# Patient Record
Sex: MALE | Race: WHITE | NOT HISPANIC OR LATINO | Employment: OTHER | URBAN - METROPOLITAN AREA
[De-identification: names, ages, dates, MRNs, and addresses within clinical notes are randomized per-mention and may not be internally consistent; named-entity substitution may affect disease eponyms.]

---

## 2019-03-18 ENCOUNTER — HOSPITAL ENCOUNTER (EMERGENCY)
Facility: OTHER | Age: 64
Discharge: HOME | End: 2019-03-18
Attending: EMERGENCY MEDICINE
Payer: COMMERCIAL

## 2019-03-18 VITALS
WEIGHT: 250 LBS | OXYGEN SATURATION: 97 % | DIASTOLIC BLOOD PRESSURE: 93 MMHG | SYSTOLIC BLOOD PRESSURE: 140 MMHG | TEMPERATURE: 98 F | HEART RATE: 80 BPM | RESPIRATION RATE: 16 BRPM

## 2019-03-18 DIAGNOSIS — Z48.02 ENCOUNTER FOR REMOVAL OF SUTURES: Primary | ICD-10-CM

## 2019-03-18 PROCEDURE — 99282 EMERGENCY DEPT VISIT SF MDM: CPT

## 2019-03-18 RX ORDER — SIMVASTATIN 10 MG/1
10 TABLET, FILM COATED ORAL NIGHTLY
Status: ON HOLD | COMMUNITY
End: 2021-01-19

## 2019-03-18 RX ORDER — HYDROCHLOROTHIAZIDE 25 MG/1
25 TABLET ORAL DAILY
COMMUNITY

## 2019-03-18 RX ORDER — DIAZEPAM 10 MG/1
10 TABLET ORAL DAILY
COMMUNITY

## 2019-03-18 RX ORDER — TELMISARTAN 80 MG/1
80 TABLET ORAL DAILY
COMMUNITY
End: 2022-05-14

## 2019-03-18 RX ORDER — FLUVOXAMINE MALEATE 100 MG/1
100 TABLET, COATED ORAL 2 TIMES DAILY
COMMUNITY
End: 2020-10-21

## 2019-03-18 RX ORDER — QUETIAPINE FUMARATE 50 MG/1
50 TABLET, FILM COATED ORAL 2 TIMES DAILY
COMMUNITY
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
## 2019-09-05 ENCOUNTER — OFFICE VISIT (OUTPATIENT)
Dept: CARDIOLOGY | Facility: CLINIC | Age: 64
End: 2019-09-05
Attending: INTERNAL MEDICINE
Payer: MEDICARE

## 2019-09-05 VITALS
HEART RATE: 57 BPM | SYSTOLIC BLOOD PRESSURE: 132 MMHG | BODY MASS INDEX: 41.47 KG/M2 | HEIGHT: 69 IN | DIASTOLIC BLOOD PRESSURE: 69 MMHG | WEIGHT: 280 LBS

## 2019-09-05 DIAGNOSIS — I34.1 MITRAL VALVE PROLAPSE: ICD-10-CM

## 2019-09-05 DIAGNOSIS — Z82.49 FAMILY HISTORY OF ISCHEMIC HEART DISEASE: ICD-10-CM

## 2019-09-05 DIAGNOSIS — I35.0 NONRHEUMATIC AORTIC VALVE STENOSIS: ICD-10-CM

## 2019-09-05 DIAGNOSIS — E78.00 HYPERCHOLESTEROLEMIA: ICD-10-CM

## 2019-09-05 DIAGNOSIS — E66.01 MORBID OBESITY: ICD-10-CM

## 2019-09-05 DIAGNOSIS — I10 ESSENTIAL HYPERTENSION: ICD-10-CM

## 2019-09-05 PROCEDURE — 99204 OFFICE O/P NEW MOD 45 MIN: CPT | Mod: S$GLB,,, | Performed by: INTERNAL MEDICINE

## 2019-09-05 PROCEDURE — 3008F PR BODY MASS INDEX (BMI) DOCUMENTED: ICD-10-PCS | Mod: CPTII,S$GLB,, | Performed by: INTERNAL MEDICINE

## 2019-09-05 PROCEDURE — 3008F BODY MASS INDEX DOCD: CPT | Mod: CPTII,S$GLB,, | Performed by: INTERNAL MEDICINE

## 2019-09-05 PROCEDURE — 99204 PR OFFICE/OUTPT VISIT, NEW, LEVL IV, 45-59 MIN: ICD-10-PCS | Mod: S$GLB,,, | Performed by: INTERNAL MEDICINE

## 2019-09-05 RX ORDER — ATENOLOL 100 MG/1
100 TABLET ORAL DAILY
Refills: 3 | COMMUNITY
Start: 2019-07-16 | End: 2019-09-05 | Stop reason: SDUPTHER

## 2019-09-05 RX ORDER — ATENOLOL 50 MG/1
50 TABLET ORAL DAILY
Qty: 90 TABLET | Refills: 3 | Status: SHIPPED | OUTPATIENT
Start: 2019-09-05 | End: 2022-05-14

## 2019-09-05 NOTE — LETTER
September 5, 2019      Bhupendra Fofana MD  [facility] [address]           [facility]  [address] [place] [address], [address]  [facility] [address]  Phone: [phone]  Fax: [phone]          Patient: Ervin Hernandez   MR Number: [id number]   YOB: 1955   Date of Visit: 9/5/2019       Dear Dr. Bhupendra Fofana:    Thank you for referring Ervin Hernandez to me for evaluation. Attached you will find relevant portions of my assessment and plan of care.    If you have questions, please do not hesitate to call me. I look forward to following Ervin Hernandez along with you.    Sincerely,    Umesh Link MD    Enclosure  CC:  No Recipients    If you would like to receive this communication electronically, please contact [email or web address] or [phone] to request more information on [facility] access.    For providers and/or their staff who would like to refer a patient to [facility], please contact us through our one-stop-shop provider referral line, [facility], at [phone].    If you feel you have received this communication in error or would no longer like to receive these types of communications, please e-mail [email or web address]

## 2019-09-05 NOTE — PROGRESS NOTES
Subjective:     Ervin Hernandez is a 64 y.o. male with hypertension and hypercholesterolemia. He is morbidly obese. He has a strong family history for coronary artery disease. In 1995 he was told he had mitral valve prolapse. He has obsessive compulsive issues. No exertional chest pain or exertional dyspnea. No palpitations or weak spells. Feeling well overall. Referred for evaluation.        Hypertension   This is a chronic problem. The current episode started more than 1 year ago. The problem is unchanged. The problem is controlled (usually 130-135/65-75 mmHg when checked.). Pertinent negatives include no anxiety, blurred vision, chest pain, headaches, malaise/fatigue, neck pain, orthopnea, palpitations, peripheral edema, PND, shortness of breath or sweats. There is no history of chronic renal disease.   Hyperlipidemia   Exacerbating diseases include obesity. He has no history of chronic renal disease, diabetes, hypothyroidism, liver disease or nephrotic syndrome. Pertinent negatives include no chest pain, focal sensory loss, focal weakness, leg pain, myalgias or shortness of breath.       Review of Systems   Constitution: Negative for chills, fever and malaise/fatigue.   HENT: Negative for nosebleeds.    Eyes: Negative for blurred vision, double vision, vision loss in left eye and vision loss in right eye.   Cardiovascular: Negative for chest pain, claudication, dyspnea on exertion, irregular heartbeat, leg swelling, near-syncope, orthopnea, palpitations, paroxysmal nocturnal dyspnea and syncope.   Respiratory: Negative for cough, hemoptysis, shortness of breath and wheezing.    Endocrine: Negative for cold intolerance and heat intolerance.   Hematologic/Lymphatic: Negative for bleeding problem. Does not bruise/bleed easily.   Skin: Negative for color change and rash.   Musculoskeletal: Negative for back pain, falls, muscle weakness, myalgias and neck pain.   Gastrointestinal: Negative for heartburn, hematemesis,  "hematochezia, hemorrhoids, jaundice, melena, nausea and vomiting.   Genitourinary: Negative for dysuria and hematuria.   Neurological: Negative for dizziness, focal weakness, headaches, light-headedness, loss of balance, numbness, vertigo and weakness.   Psychiatric/Behavioral: Negative for altered mental status, depression and memory loss. The patient is not nervous/anxious.    Allergic/Immunologic: Negative for hives and persistent infections.       Current Outpatient Medications on File Prior to Visit   Medication Sig Dispense Refill    atenolol (TENORMIN) 100 MG tablet Take 100 mg by mouth once daily.  3    diazePAM (VALIUM) 10 MG Tab Take 10 mg by mouth.      fluvoxaMINE (LUVOX) 100 MG tablet Take 100 mg by mouth every evening.      hydroCHLOROthiazide (HYDRODIURIL) 25 MG tablet Take 25 mg by mouth once daily.      QUEtiapine (SEROQUEL) 50 MG tablet Take 50 mg by mouth every evening.      simvastatin (ZOCOR) 10 MG tablet Take 10 mg by mouth every evening.      telmisartan (MICARDIS) 80 MG Tab Take 40 mg by mouth once daily.       No current facility-administered medications on file prior to visit.        /69   Pulse (!) 57   Ht 5' 9" (1.753 m)   Wt 127 kg (280 lb)   BMI 41.35 kg/m²       Objective:     Physical Exam   Constitutional: He is oriented to person, place, and time. He appears well-developed and well-nourished.  Non-toxic appearance. No distress.   HENT:   Head: Normocephalic and atraumatic.   Nose: Nose normal.   Eyes: Right eye exhibits no discharge. Left eye exhibits no discharge. Right conjunctiva is not injected. Left conjunctiva is not injected. Right pupil is round. Left pupil is round. Pupils are equal.   Neck: Neck supple. No JVD present. Carotid bruit is not present. No thyromegaly present.   Cardiovascular: Normal rate, regular rhythm, S1 normal and S2 normal.  No extrasystoles are present. PMI is not displaced. Exam reveals no gallop.   Murmur heard.  High-pitched " midsystolic murmur is present at the upper right sternal border.  Pulses:       Radial pulses are 2+ on the right side, and 2+ on the left side.        Femoral pulses are 2+ on the right side, and 2+ on the left side.       Dorsalis pedis pulses are 2+ on the right side, and 2+ on the left side.        Posterior tibial pulses are 2+ on the right side, and 2+ on the left side.   Pulmonary/Chest: Effort normal and breath sounds normal.   Abdominal: Soft. Normal appearance. There is no hepatosplenomegaly. There is no tenderness.   Musculoskeletal:        Right ankle: He exhibits no swelling, no ecchymosis and no deformity.        Left ankle: He exhibits no swelling, no ecchymosis and no deformity.   Lymphadenopathy:        Head (right side): No submandibular adenopathy present.        Head (left side): No submandibular adenopathy present.     He has no cervical adenopathy.   Neurological: He is alert and oriented to person, place, and time. He is not disoriented. No cranial nerve deficit.   Skin: Skin is warm, dry and intact. No rash noted. He is not diaphoretic. No cyanosis. Nails show no clubbing.   Psychiatric: He has a normal mood and affect. His speech is normal and behavior is normal. Judgment and thought content normal. Cognition and memory are normal.       Assessment:     1. Mitral valve prolapse    2. Essential hypertension    3. Hypercholesterolemia    4. Morbid obesity    5. Family history of ischemic heart disease        Plan:     1. Mitral Valve Disease   1995: Diagnosed.   9/5/2019: Do Echo. Current murmur suggest aortic valve disease.    2. Hypertension   1995: Diagnosed.   On atenolol 100 mg Q24 and hctz 25 mg Q24.   Advised to begin telmisartan 80 mg Q24 and reduce atenolol to 50 mg Q24.   Keep log at home.    3. Hypercholesterolemia   1995: Began statin.   On simvastatin 10 mg Q24.    4. Morbid Obesity   9/5/2019: Weight 127 kg. BMI 41.    5. Family History for Ischemic Heart Disease   Father MI age  63.   Brother MI age 36.    6. Obsessive Compulsive Disorder   Life long issue.    7. Primary Care   Dr. Bhupendra Fofana.    F/u 4 months.    Umesh Link M.D.      9/8/2019 6:43 PM, Addendum:    9/5/2019: Echo: Normal left ventricular size and systolic function. Mild LVH. Mildly dilated LA. Severe AS - 1.0 cm2 - 4.6 m/s - MG 52 mmHg.    I discussed the above test result and the implications of the findings over the phone in detail.    He should call me if any CP, SOB or exertional fatigue. See me in 2-3 months.    Umesh Link M.D.       Copy:    Dr. Bhupendra Fofana.

## 2019-09-08 PROBLEM — I35.0 AORTIC STENOSIS: Status: ACTIVE | Noted: 2019-09-05

## 2019-09-08 LAB
AORTIC ROOT ANNULUS: 3.4 CM
AORTIC VALVE CUSP SEPERATION: 2 CM
AV INDEX (PROSTH): 0.31
AV MEAN GRADIENT: 52 MMHG
AV PEAK GRADIENT: 85 MMHG
AV VALVE AREA: 0.97 CM2
AV VELOCITY RATIO: 0.24
BSA FOR ECHO PROCEDURE: 2.49 M2
CV ECHO LV RWT: 0.6 CM
DOP CALC AO PEAK VEL: 4.6 M/S
DOP CALC AO VTI: 112.1 CM
DOP CALC LVOT AREA: 3.1 CM2
DOP CALC LVOT DIAMETER: 2 CM
DOP CALC LVOT PEAK VEL: 1.1 M/S
DOP CALC LVOT STROKE VOLUME: 108.64 CM3
DOP CALCLVOT PEAK VEL VTI: 34.6 CM
E WAVE DECELERATION TIME: 390 MSEC
E/A RATIO: 0.83
ECHO EF ESTIMATED: 63 %
ECHO LV POSTERIOR WALL: 1.3 CM (ref 0.6–1.1)
FRACTIONAL SHORTENING: 35 % (ref 28–44)
INTERVENTRICULAR SEPTUM: 1.4 CM (ref 0.6–1.1)
IVC PROX: 1.6 CM
IVRT: 126 MSEC
LEFT ATRIUM SIZE: 4.5 CM
LEFT INTERNAL DIMENSION IN SYSTOLE: 2.8 CM (ref 2.1–4)
LEFT VENTRICLE MASS INDEX: 92 G/M2
LEFT VENTRICULAR INTERNAL DIMENSION IN DIASTOLE: 4.3 CM (ref 3.5–6)
LEFT VENTRICULAR MASS: 219.83 G
LV LATERAL E/E' RATIO: 0.06 M/S
MV A" WAVE DURATION": 194 MSEC
MV PEAK A VEL: 0.69 M/S
MV PEAK E VEL: 0.57 M/S
PISA TR MAX VEL: 2.18 M/S
PULM VEIN A" WAVE DURATION": 95 MSEC
PULM VEIN S/D RATIO: 1.74
PV PEAK D VEL: 0.27 M/S
PV PEAK S VEL: 0.47 M/S
RA PRESSURE: 3 MMHG
RIGHT VENTRICULAR END-DIASTOLIC DIMENSION: 3.6 CM
TDI LATERAL: 9 M/S
TR MAX PG: 19 MMHG
TRICUSPID ANNULAR PLANE SYSTOLIC EXCURSION: 1.8 CM
TRICUSPID VALVE PEAK A WAVE VELOCITY: 0.61 M/S
TV PEAK E VEL: 0.49 M/S
TV REST PULMONARY ARTERY PRESSURE: 22 MMHG

## 2019-11-27 ENCOUNTER — OFFICE VISIT (OUTPATIENT)
Dept: CARDIOLOGY | Facility: CLINIC | Age: 64
End: 2019-11-27
Attending: INTERNAL MEDICINE
Payer: MEDICARE

## 2019-11-27 VITALS
WEIGHT: 280 LBS | HEART RATE: 65 BPM | DIASTOLIC BLOOD PRESSURE: 78 MMHG | BODY MASS INDEX: 41.47 KG/M2 | SYSTOLIC BLOOD PRESSURE: 125 MMHG | HEIGHT: 69 IN

## 2019-11-27 DIAGNOSIS — E78.00 HYPERCHOLESTEROLEMIA: ICD-10-CM

## 2019-11-27 DIAGNOSIS — E66.01 MORBID OBESITY: ICD-10-CM

## 2019-11-27 DIAGNOSIS — Z82.49 FAMILY HISTORY OF ISCHEMIC HEART DISEASE: ICD-10-CM

## 2019-11-27 DIAGNOSIS — I10 ESSENTIAL HYPERTENSION: ICD-10-CM

## 2019-11-27 DIAGNOSIS — I35.0 NONRHEUMATIC AORTIC VALVE STENOSIS: ICD-10-CM

## 2019-11-27 PROCEDURE — 99214 OFFICE O/P EST MOD 30 MIN: CPT | Mod: S$GLB,,, | Performed by: INTERNAL MEDICINE

## 2019-11-27 PROCEDURE — 3008F BODY MASS INDEX DOCD: CPT | Mod: CPTII,S$GLB,, | Performed by: INTERNAL MEDICINE

## 2019-11-27 PROCEDURE — 99214 PR OFFICE/OUTPT VISIT, EST, LEVL IV, 30-39 MIN: ICD-10-PCS | Mod: S$GLB,,, | Performed by: INTERNAL MEDICINE

## 2019-11-27 PROCEDURE — 3008F PR BODY MASS INDEX (BMI) DOCUMENTED: ICD-10-PCS | Mod: CPTII,S$GLB,, | Performed by: INTERNAL MEDICINE

## 2019-11-27 NOTE — PROGRESS NOTES
Subjective:     Ervin Hernandez is a 64 y.o. male with hypertension and hypercholesterolemia. He is morbidly obese. He has a strong family history for coronary artery disease. In 1995 he was told he had mitral valve prolapse. He has obsessive compulsive issues. On 9/5/2019 he had an Echo that revealed normal left ventricular size and systolic function. There was mild LVH and the LA was mildly dilated LA. There was severe AS with an MARCELA of 1.0 cm2 and a peak velocity of 4.6 m/s and a mean gradient of 52 mmHg. He totally denies any exertional chest pain or exertional dyspnea. No palpitations or weak spells. Feeling well overall.      Hypertension   This is a chronic problem. The current episode started more than 1 year ago. The problem is unchanged. The problem is controlled (usually 120-130/65-75 mmHg when checked.). Pertinent negatives include no anxiety, blurred vision, chest pain, headaches, malaise/fatigue, neck pain, orthopnea, palpitations, peripheral edema, PND, shortness of breath or sweats. There is no history of chronic renal disease.   Hyperlipidemia   Exacerbating diseases include obesity. He has no history of chronic renal disease, diabetes, hypothyroidism, liver disease or nephrotic syndrome. Pertinent negatives include no chest pain, focal sensory loss, focal weakness, leg pain, myalgias or shortness of breath.       Review of Systems   Constitution: Negative for chills, fever and malaise/fatigue.   HENT: Negative for nosebleeds.    Eyes: Negative for blurred vision, double vision, vision loss in left eye and vision loss in right eye.   Cardiovascular: Negative for chest pain, claudication, dyspnea on exertion, irregular heartbeat, leg swelling, near-syncope, orthopnea, palpitations, paroxysmal nocturnal dyspnea and syncope.   Respiratory: Negative for cough, hemoptysis, shortness of breath and wheezing.    Endocrine: Negative for cold intolerance and heat intolerance.   Hematologic/Lymphatic: Negative  "for bleeding problem. Does not bruise/bleed easily.   Skin: Negative for color change and rash.   Musculoskeletal: Negative for back pain, falls, muscle weakness, myalgias and neck pain.   Gastrointestinal: Negative for heartburn, hematemesis, hematochezia, hemorrhoids, jaundice, melena, nausea and vomiting.   Genitourinary: Negative for dysuria and hematuria.   Neurological: Negative for dizziness, focal weakness, headaches, light-headedness, loss of balance, numbness, vertigo and weakness.   Psychiatric/Behavioral: Negative for altered mental status, depression and memory loss. The patient is not nervous/anxious.    Allergic/Immunologic: Negative for hives and persistent infections.       Current Outpatient Medications on File Prior to Visit   Medication Sig Dispense Refill    atenolol (TENORMIN) 50 MG tablet Take 1 tablet (50 mg total) by mouth once daily. 90 tablet 3    diazePAM (VALIUM) 10 MG Tab Take 10 mg by mouth.      fluvoxaMINE (LUVOX) 100 MG tablet Take 100 mg by mouth every evening.      hydroCHLOROthiazide (HYDRODIURIL) 25 MG tablet Take 25 mg by mouth once daily.      QUEtiapine (SEROQUEL) 50 MG tablet Take 50 mg by mouth every evening.      simvastatin (ZOCOR) 10 MG tablet Take 10 mg by mouth every evening.      telmisartan (MICARDIS) 80 MG Tab Take 40 mg by mouth once daily.       No current facility-administered medications on file prior to visit.        /78   Pulse 65   Ht 5' 9" (1.753 m)   Wt 127 kg (280 lb)   BMI 41.35 kg/m²       Objective:     Physical Exam   Constitutional: He is oriented to person, place, and time. He appears well-developed and well-nourished.  Non-toxic appearance. No distress.   HENT:   Head: Normocephalic and atraumatic.   Nose: Nose normal.   Eyes: Right eye exhibits no discharge. Left eye exhibits no discharge. Right conjunctiva is not injected. Left conjunctiva is not injected. Right pupil is round. Left pupil is round. Pupils are equal.   Neck: Neck " "supple. No JVD present. Carotid bruit is not present. No thyromegaly present.   Cardiovascular: Normal rate, regular rhythm, S1 normal and S2 normal.  No extrasystoles are present. PMI is not displaced. Exam reveals no gallop.   Murmur heard.   Harsh midsystolic murmur is present at the upper right sternal border.  Pulses:       Radial pulses are 2+ on the right side, and 2+ on the left side.        Femoral pulses are 2+ on the right side, and 2+ on the left side.       Dorsalis pedis pulses are 2+ on the right side, and 2+ on the left side.        Posterior tibial pulses are 2+ on the right side, and 2+ on the left side.   Pulmonary/Chest: Effort normal and breath sounds normal.   Abdominal: Soft. Normal appearance. There is no hepatosplenomegaly. There is no tenderness.   Musculoskeletal:        Right ankle: He exhibits no swelling, no ecchymosis and no deformity.        Left ankle: He exhibits no swelling, no ecchymosis and no deformity.   Lymphadenopathy:        Head (right side): No submandibular adenopathy present.        Head (left side): No submandibular adenopathy present.     He has no cervical adenopathy.   Neurological: He is alert and oriented to person, place, and time. He is not disoriented. No cranial nerve deficit.   Skin: Skin is warm, dry and intact. No rash noted. He is not diaphoretic.   Psychiatric: He has a normal mood and affect. His speech is normal and behavior is normal. Judgment and thought content normal. Cognition and memory are normal.       Assessment:     1. Nonrheumatic aortic valve stenosis    2. Essential hypertension    3. Hypercholesterolemia    4. Morbid obesity    5. Family history of ischemic heart disease        Plan:     1. Aortic Stenosis    9/5/2019: Echo: Normal left ventricular size and systolic function. Mild LVH. Mildly dilated LA. Severe AS - 1.0 cm2 - 4.6 m/s - MG 52 mmHg.   "Totally denies symptoms".   Advised to call me if any exertional CP, SOB or " lightheadedness.   9/2020: Plan next Echo.    2. Hypertension   1995: Diagnosed.   On atenolol 50 mg Q24, telmisartan 80 mg Q24 and hctz 25 mg Q24.   Advised to keep log at home.   Appears well controlled.    3. Hypercholesterolemia   1995: Began statin.   On simvastatin 10 mg Q24.   Tells me well controlled.    4. Morbid Obesity   9/5/2019: Weight 127 kg. BMI 41.    5. Family History for Ischemic Heart Disease   Father MI age 63.   Brother MI age 36.    6. Obsessive Compulsive Disorder   Life long issue.    7. Primary Care   Dr. Konstantin Spencer.    F/u 3 months.    Umesh Link M.D.

## 2020-03-04 ENCOUNTER — OFFICE VISIT (OUTPATIENT)
Dept: CARDIOLOGY | Facility: CLINIC | Age: 65
End: 2020-03-04
Attending: INTERNAL MEDICINE
Payer: MEDICARE

## 2020-03-04 VITALS
HEIGHT: 69 IN | DIASTOLIC BLOOD PRESSURE: 76 MMHG | WEIGHT: 300 LBS | BODY MASS INDEX: 44.43 KG/M2 | SYSTOLIC BLOOD PRESSURE: 113 MMHG | HEART RATE: 57 BPM

## 2020-03-04 DIAGNOSIS — I35.0 NONRHEUMATIC AORTIC VALVE STENOSIS: ICD-10-CM

## 2020-03-04 DIAGNOSIS — Z82.49 FAMILY HISTORY OF ISCHEMIC HEART DISEASE: ICD-10-CM

## 2020-03-04 DIAGNOSIS — E66.01 MORBID OBESITY: ICD-10-CM

## 2020-03-04 DIAGNOSIS — E78.00 HYPERCHOLESTEROLEMIA: ICD-10-CM

## 2020-03-04 DIAGNOSIS — I10 ESSENTIAL HYPERTENSION: ICD-10-CM

## 2020-03-04 PROCEDURE — 93000 PR ELECTROCARDIOGRAM, COMPLETE: ICD-10-PCS | Mod: S$GLB,,, | Performed by: INTERNAL MEDICINE

## 2020-03-04 PROCEDURE — 93000 ELECTROCARDIOGRAM COMPLETE: CPT | Mod: S$GLB,,, | Performed by: INTERNAL MEDICINE

## 2020-03-04 PROCEDURE — 3074F SYST BP LT 130 MM HG: CPT | Mod: CPTII,S$GLB,, | Performed by: INTERNAL MEDICINE

## 2020-03-04 PROCEDURE — 3078F PR MOST RECENT DIASTOLIC BLOOD PRESSURE < 80 MM HG: ICD-10-PCS | Mod: CPTII,S$GLB,, | Performed by: INTERNAL MEDICINE

## 2020-03-04 PROCEDURE — 3078F DIAST BP <80 MM HG: CPT | Mod: CPTII,S$GLB,, | Performed by: INTERNAL MEDICINE

## 2020-03-04 PROCEDURE — 3074F PR MOST RECENT SYSTOLIC BLOOD PRESSURE < 130 MM HG: ICD-10-PCS | Mod: CPTII,S$GLB,, | Performed by: INTERNAL MEDICINE

## 2020-03-04 PROCEDURE — 3008F BODY MASS INDEX DOCD: CPT | Mod: CPTII,S$GLB,, | Performed by: INTERNAL MEDICINE

## 2020-03-04 PROCEDURE — 3008F PR BODY MASS INDEX (BMI) DOCUMENTED: ICD-10-PCS | Mod: CPTII,S$GLB,, | Performed by: INTERNAL MEDICINE

## 2020-03-04 PROCEDURE — 99214 PR OFFICE/OUTPT VISIT, EST, LEVL IV, 30-39 MIN: ICD-10-PCS | Mod: 25,S$GLB,, | Performed by: INTERNAL MEDICINE

## 2020-03-04 PROCEDURE — 99214 OFFICE O/P EST MOD 30 MIN: CPT | Mod: 25,S$GLB,, | Performed by: INTERNAL MEDICINE

## 2020-03-04 NOTE — PROGRESS NOTES
Subjective:     Ervin Hernandez is a 64 y.o. male with hypertension and hypercholesterolemia. He is morbidly obese. He has a strong family history for coronary artery disease. In 1995 he was told he had mitral valve prolapse. He has obsessive compulsive issues. On 9/5/2019 he had an Echo that revealed normal left ventricular size and systolic function. There was mild LVH and the LA was mildly dilated LA. There was severe AS with an MARCELA of 1.0 cm2 and a peak velocity of 4.6 m/s and a mean gradient of 52 mmHg. He totally denied any exertional chest pain or exertional dyspnea when I saw him on 11/27/2019. However when I see him on 3/4/2020 he has began experience mild to moderate exertional shortness of breath. No palpitations or weak spells. Feeling well overall.      Hypertension   This is a chronic problem. The current episode started more than 1 year ago. The problem is unchanged. The problem is controlled (usually 120-130/65-75 mmHg when checked.). Pertinent negatives include no anxiety, blurred vision, chest pain, headaches, malaise/fatigue, neck pain, orthopnea, palpitations, peripheral edema, PND, shortness of breath or sweats. There is no history of chronic renal disease.   Hyperlipidemia   Exacerbating diseases include obesity. He has no history of chronic renal disease, diabetes, hypothyroidism, liver disease or nephrotic syndrome. Pertinent negatives include no chest pain, focal sensory loss, focal weakness, leg pain, myalgias or shortness of breath.       Review of Systems   Constitution: Negative for chills, fever and malaise/fatigue.   HENT: Negative for nosebleeds.    Eyes: Negative for blurred vision, double vision, vision loss in left eye and vision loss in right eye.   Cardiovascular: Negative for chest pain, claudication, dyspnea on exertion, irregular heartbeat, leg swelling, near-syncope, orthopnea, palpitations, paroxysmal nocturnal dyspnea and syncope.   Respiratory: Negative for cough, hemoptysis,  "shortness of breath and wheezing.    Endocrine: Negative for cold intolerance and heat intolerance.   Hematologic/Lymphatic: Negative for bleeding problem. Does not bruise/bleed easily.   Skin: Negative for color change and rash.   Musculoskeletal: Negative for back pain, falls, muscle weakness, myalgias and neck pain.   Gastrointestinal: Negative for heartburn, hematemesis, hematochezia, hemorrhoids, jaundice, melena, nausea and vomiting.   Genitourinary: Negative for dysuria and hematuria.   Neurological: Negative for dizziness, focal weakness, headaches, light-headedness, loss of balance, numbness, vertigo and weakness.   Psychiatric/Behavioral: Negative for altered mental status, depression and memory loss. The patient is not nervous/anxious.    Allergic/Immunologic: Negative for hives and persistent infections.       Current Outpatient Medications on File Prior to Visit   Medication Sig Dispense Refill    atenolol (TENORMIN) 50 MG tablet Take 1 tablet (50 mg total) by mouth once daily. 90 tablet 3    diazePAM (VALIUM) 10 MG Tab Take 10 mg by mouth.      fluvoxaMINE (LUVOX) 100 MG tablet Take 100 mg by mouth every evening.      hydroCHLOROthiazide (HYDRODIURIL) 25 MG tablet Take 25 mg by mouth once daily.      QUEtiapine (SEROQUEL) 50 MG tablet Take 50 mg by mouth every evening.      simvastatin (ZOCOR) 10 MG tablet Take 10 mg by mouth every evening.      telmisartan (MICARDIS) 80 MG Tab Take 40 mg by mouth once daily.       No current facility-administered medications on file prior to visit.        /76   Pulse (!) 57   Ht 5' 9" (1.753 m)   Wt 136.1 kg (300 lb)   BMI 44.30 kg/m²       Objective:     Physical Exam   Constitutional: He is oriented to person, place, and time. He appears well-developed and well-nourished.  Non-toxic appearance. No distress.   HENT:   Head: Normocephalic and atraumatic.   Nose: Nose normal.   Eyes: Right eye exhibits no discharge. Left eye exhibits no discharge. " Right conjunctiva is not injected. Left conjunctiva is not injected. Right pupil is round. Left pupil is round. Pupils are equal.   Neck: Neck supple. No JVD present. Carotid bruit is not present. No thyromegaly present.   Cardiovascular: Normal rate, regular rhythm, S1 normal and S2 normal.  No extrasystoles are present. PMI is not displaced. Exam reveals no gallop.   Murmur heard.   Harsh midsystolic murmur is present at the upper right sternal border.  Pulses:       Radial pulses are 2+ on the right side, and 2+ on the left side.        Femoral pulses are 2+ on the right side, and 2+ on the left side.       Dorsalis pedis pulses are 2+ on the right side, and 2+ on the left side.        Posterior tibial pulses are 2+ on the right side, and 2+ on the left side.   Pulmonary/Chest: Effort normal and breath sounds normal.   Abdominal: Soft. Normal appearance. There is no hepatosplenomegaly. There is no tenderness.   Musculoskeletal:        Right ankle: He exhibits no swelling, no ecchymosis and no deformity.        Left ankle: He exhibits no swelling, no ecchymosis and no deformity.   Lymphadenopathy:        Head (right side): No submandibular adenopathy present.        Head (left side): No submandibular adenopathy present.     He has no cervical adenopathy.   Neurological: He is alert and oriented to person, place, and time. He is not disoriented. No cranial nerve deficit or sensory deficit.   Skin: Skin is warm, dry and intact. No rash noted. He is not diaphoretic.   Psychiatric: He has a normal mood and affect. His speech is normal and behavior is normal. Judgment and thought content normal. Cognition and memory are normal.       Assessment:     1. Nonrheumatic aortic valve stenosis    2. Essential hypertension    3. Hypercholesterolemia    4. Morbid obesity    5. Family history of ischemic heart disease        Plan:     1. Aortic Stenosis    9/5/2019: Echo: Normal left ventricular size and systolic function. Mild  "LVH. Mildly dilated LA. Severe AS - 1.0 cm2 - 4.6 m/s - MG 52 mmHg.   11/27/2019: "Totally denies symptoms".   3/4/2020: Mild exertional dyspnea.   Advised to begin evaluation for AVR or TAVR.   He wants to discuss this with Dr. Merary Phillip prior to proceeding.   Will send copy of note to Dr. Merary Phillip.    2. Hypertension   1995: Diagnosed.   On atenolol 50 mg Q24, telmisartan 80 mg Q24 and hctz 25 mg Q24.   Advised to keep log at home.   Appears well controlled.    3. Hypercholesterolemia   1995: Began statin.   On simvastatin 10 mg Q24.   Tells me well controlled.    4. Morbid Obesity   9/5/2019: Weight 127 kg. BMI 41.   3/4/2020: Weight 136 kg. BMI 44.    5. Family History for Ischemic Heart Disease   Father MI age 63.   Brother MI age 36.    6. Obsessive Compulsive Disorder   Life long issue.    7. Primary Care   Dr. Merary Phillip.    F/u 2 months.    Umesh Link M.D.      Copy:    Dr. Merary Phillip.    "

## 2020-03-09 ENCOUNTER — OFFICE VISIT (OUTPATIENT)
Dept: CARDIOLOGY | Facility: CLINIC | Age: 65
End: 2020-03-09
Attending: INTERNAL MEDICINE
Payer: MEDICARE

## 2020-03-09 VITALS
SYSTOLIC BLOOD PRESSURE: 140 MMHG | HEIGHT: 69 IN | BODY MASS INDEX: 44.43 KG/M2 | HEART RATE: 60 BPM | WEIGHT: 300 LBS | DIASTOLIC BLOOD PRESSURE: 80 MMHG

## 2020-03-09 DIAGNOSIS — I35.0 NONRHEUMATIC AORTIC VALVE STENOSIS: ICD-10-CM

## 2020-03-09 DIAGNOSIS — E66.01 MORBID OBESITY: ICD-10-CM

## 2020-03-09 DIAGNOSIS — Z82.49 FAMILY HISTORY OF ISCHEMIC HEART DISEASE: ICD-10-CM

## 2020-03-09 DIAGNOSIS — E78.00 HYPERCHOLESTEROLEMIA: ICD-10-CM

## 2020-03-09 DIAGNOSIS — I10 ESSENTIAL HYPERTENSION: ICD-10-CM

## 2020-03-09 PROCEDURE — 3079F PR MOST RECENT DIASTOLIC BLOOD PRESSURE 80-89 MM HG: ICD-10-PCS | Mod: CPTII,S$GLB,, | Performed by: INTERNAL MEDICINE

## 2020-03-09 PROCEDURE — 3077F SYST BP >= 140 MM HG: CPT | Mod: CPTII,S$GLB,, | Performed by: INTERNAL MEDICINE

## 2020-03-09 PROCEDURE — 3008F BODY MASS INDEX DOCD: CPT | Mod: CPTII,S$GLB,, | Performed by: INTERNAL MEDICINE

## 2020-03-09 PROCEDURE — 3008F PR BODY MASS INDEX (BMI) DOCUMENTED: ICD-10-PCS | Mod: CPTII,S$GLB,, | Performed by: INTERNAL MEDICINE

## 2020-03-09 PROCEDURE — 99214 OFFICE O/P EST MOD 30 MIN: CPT | Mod: S$GLB,,, | Performed by: INTERNAL MEDICINE

## 2020-03-09 PROCEDURE — 3079F DIAST BP 80-89 MM HG: CPT | Mod: CPTII,S$GLB,, | Performed by: INTERNAL MEDICINE

## 2020-03-09 PROCEDURE — 3077F PR MOST RECENT SYSTOLIC BLOOD PRESSURE >= 140 MM HG: ICD-10-PCS | Mod: CPTII,S$GLB,, | Performed by: INTERNAL MEDICINE

## 2020-03-09 PROCEDURE — 99214 PR OFFICE/OUTPT VISIT, EST, LEVL IV, 30-39 MIN: ICD-10-PCS | Mod: S$GLB,,, | Performed by: INTERNAL MEDICINE

## 2020-03-09 NOTE — PROGRESS NOTES
Subjective:     Ervin Hernandez is a 64 y.o. male with hypertension and hypercholesterolemia. He is morbidly obese. He has a strong family history for coronary artery disease. In 1995 he was told he had mitral valve prolapse. He has obsessive compulsive issues. On 9/5/2019 he had an Echo that revealed normal left ventricular size and systolic function. There was mild LVH and the LA was mildly dilated LA. There was severe AS with an MARCELA of 1.0 cm2 and a peak velocity of 4.6 m/s and a mean gradient of 52 mmHg. He totally denied any exertional chest pain or exertional dyspnea when I saw him on 11/27/2019. However, when I saw him on 3/4/2020 he had began experience mild to moderate exertional shortness of breath. He was advised evaluation for AVR. He was hesitant but after discussing the issues with his brother and Dr. Merary Phillip he has changed his mind and wants to proceed. He No palpitations or weak spells. Feeling well overall.      Hypertension   This is a chronic problem. The current episode started more than 1 year ago. The problem is unchanged. The problem is controlled (usually 120-130/65-75 mmHg when checked.). Associated symptoms include shortness of breath. Pertinent negatives include no anxiety, blurred vision, chest pain, headaches, malaise/fatigue, neck pain, orthopnea, palpitations, peripheral edema, PND or sweats. There is no history of chronic renal disease.   Hyperlipidemia   This is a chronic problem. The current episode started more than 1 year ago. The problem is controlled. Recent lipid tests were reviewed and are normal. Exacerbating diseases include obesity. He has no history of chronic renal disease, diabetes, hypothyroidism, liver disease or nephrotic syndrome. Associated symptoms include shortness of breath. Pertinent negatives include no chest pain, focal sensory loss, focal weakness, leg pain or myalgias.       Review of Systems   Constitution: Negative for chills, fever and  malaise/fatigue.   HENT: Negative for nosebleeds.    Eyes: Negative for blurred vision, double vision, vision loss in left eye and vision loss in right eye.   Cardiovascular: Positive for dyspnea on exertion. Negative for chest pain, claudication, irregular heartbeat, leg swelling, near-syncope, orthopnea, palpitations, paroxysmal nocturnal dyspnea and syncope.   Respiratory: Positive for shortness of breath. Negative for cough, hemoptysis and wheezing.    Endocrine: Negative for cold intolerance and heat intolerance.   Hematologic/Lymphatic: Negative for bleeding problem. Does not bruise/bleed easily.   Skin: Negative for color change and rash.   Musculoskeletal: Negative for back pain, falls, muscle weakness, myalgias and neck pain.   Gastrointestinal: Negative for heartburn, hematemesis, hematochezia, hemorrhoids, jaundice, melena, nausea and vomiting.   Genitourinary: Negative for dysuria and hematuria.   Neurological: Negative for dizziness, focal weakness, headaches, light-headedness, loss of balance, numbness, vertigo and weakness.   Psychiatric/Behavioral: Negative for altered mental status, depression and memory loss. The patient is not nervous/anxious.    Allergic/Immunologic: Negative for hives and persistent infections.       Current Outpatient Medications on File Prior to Visit   Medication Sig Dispense Refill    atenolol (TENORMIN) 50 MG tablet Take 1 tablet (50 mg total) by mouth once daily. 90 tablet 3    diazePAM (VALIUM) 10 MG Tab Take 10 mg by mouth.      fluvoxaMINE (LUVOX) 100 MG tablet Take 100 mg by mouth every evening.      hydroCHLOROthiazide (HYDRODIURIL) 25 MG tablet Take 25 mg by mouth once daily.      QUEtiapine (SEROQUEL) 50 MG tablet Take 50 mg by mouth every evening.      simvastatin (ZOCOR) 10 MG tablet Take 10 mg by mouth every evening.      telmisartan (MICARDIS) 80 MG Tab Take 40 mg by mouth once daily.       No current facility-administered medications on file prior to  "visit.        BP (!) 140/80   Pulse 60   Ht 5' 9" (1.753 m)   Wt 136.1 kg (300 lb)   BMI 44.30 kg/m²       Objective:     Physical Exam   Constitutional: He is oriented to person, place, and time. He appears well-developed and well-nourished.  Non-toxic appearance. No distress.   HENT:   Head: Normocephalic and atraumatic.   Nose: Nose normal.   Eyes: Right eye exhibits no discharge. Left eye exhibits no discharge. Right conjunctiva is not injected. Left conjunctiva is not injected. Right pupil is round. Left pupil is round. Pupils are equal.   Neck: Neck supple. No JVD present. Carotid bruit is not present. No thyromegaly present.   Cardiovascular: Normal rate, regular rhythm, S1 normal and S2 normal.  No extrasystoles are present. PMI is not displaced. Exam reveals no gallop.   Murmur heard.   Harsh midsystolic murmur is present at the upper right sternal border radiating to the neck.  Pulses:       Radial pulses are 2+ on the right side, and 2+ on the left side.        Femoral pulses are 2+ on the right side, and 2+ on the left side.       Dorsalis pedis pulses are 2+ on the right side, and 2+ on the left side.        Posterior tibial pulses are 2+ on the right side, and 2+ on the left side.   Pulmonary/Chest: Effort normal and breath sounds normal.   Abdominal: Soft. Normal appearance. There is no hepatosplenomegaly. There is no tenderness.   Musculoskeletal:        Right ankle: He exhibits no swelling, no ecchymosis and no deformity.        Left ankle: He exhibits no swelling, no ecchymosis and no deformity.   Lymphadenopathy:        Head (right side): No submandibular adenopathy present.        Head (left side): No submandibular adenopathy present.     He has no cervical adenopathy.   Neurological: He is alert and oriented to person, place, and time. He is not disoriented. No cranial nerve deficit or sensory deficit.   Skin: Skin is warm, dry and intact. No rash noted. He is not diaphoretic.   Psychiatric: " "He has a normal mood and affect. His speech is normal and behavior is normal. Judgment and thought content normal. Cognition and memory are normal.       Assessment:     1. Nonrheumatic aortic valve stenosis    2. Essential hypertension    3. Hypercholesterolemia    4. Morbid obesity    5. Family history of ischemic heart disease        Plan:     1. Aortic Stenosis    9/5/2019: Echo: Normal left ventricular size and systolic function. Mild LVH. Mildly dilated LA. Severe AS - 1.0 cm2 - 4.6 m/s - MG 52 mmHg.   11/27/2019: "Totally denies symptoms".   3/4/2020: Mild exertional dyspnea.   Advised to begin evaluation for AVR or TAVR.   He wants to proceeds.    2. Hypertension   1995: Diagnosed.   On atenolol 50 mg Q24, telmisartan 80 mg Q24 and hctz 25 mg Q24.   Advised to keep log at home.   Appears well controlled.    3. Hypercholesterolemia   1995: Began statin.   On simvastatin 10 mg Q24.   Tells me well controlled.    4. Morbid Obesity   9/5/2019: Weight 127 kg. BMI 41.   3/4/2020: Weight 136 kg. BMI 44.    5. Family History for Ischemic Heart Disease   Father MI age 63.   Brother MI age 36.    6. Obsessive Compulsive Disorder   Life long issue.    7. Primary Care   Dr. Merary Phillip.    The planned procedure was discussed in detail with the patient and the family members present. Risk, benefit and alternatives were reviewed. All questions answered. Consent was then signed.    If further questions or concerns arise the patient was encouraged to contact me prior to the planned procedure.     F/u 1 month.    Umesh Link M.D.    "

## 2020-08-07 ENCOUNTER — OFFICE VISIT (OUTPATIENT)
Dept: CARDIOLOGY | Facility: CLINIC | Age: 65
End: 2020-08-07
Attending: INTERNAL MEDICINE
Payer: MEDICARE

## 2020-08-07 VITALS
HEIGHT: 69 IN | DIASTOLIC BLOOD PRESSURE: 85 MMHG | SYSTOLIC BLOOD PRESSURE: 128 MMHG | WEIGHT: 302 LBS | BODY MASS INDEX: 44.73 KG/M2 | HEART RATE: 82 BPM

## 2020-08-07 DIAGNOSIS — E78.00 HYPERCHOLESTEROLEMIA: ICD-10-CM

## 2020-08-07 DIAGNOSIS — Z82.49 FAMILY HISTORY OF ISCHEMIC HEART DISEASE: ICD-10-CM

## 2020-08-07 DIAGNOSIS — I35.0 NONRHEUMATIC AORTIC VALVE STENOSIS: ICD-10-CM

## 2020-08-07 DIAGNOSIS — E66.01 MORBID OBESITY: ICD-10-CM

## 2020-08-07 DIAGNOSIS — I10 ESSENTIAL HYPERTENSION: ICD-10-CM

## 2020-08-07 PROCEDURE — 3074F PR MOST RECENT SYSTOLIC BLOOD PRESSURE < 130 MM HG: ICD-10-PCS | Mod: CPTII,S$GLB,, | Performed by: INTERNAL MEDICINE

## 2020-08-07 PROCEDURE — 3008F BODY MASS INDEX DOCD: CPT | Mod: CPTII,S$GLB,, | Performed by: INTERNAL MEDICINE

## 2020-08-07 PROCEDURE — 3008F PR BODY MASS INDEX (BMI) DOCUMENTED: ICD-10-PCS | Mod: CPTII,S$GLB,, | Performed by: INTERNAL MEDICINE

## 2020-08-07 PROCEDURE — 3079F PR MOST RECENT DIASTOLIC BLOOD PRESSURE 80-89 MM HG: ICD-10-PCS | Mod: CPTII,S$GLB,, | Performed by: INTERNAL MEDICINE

## 2020-08-07 PROCEDURE — 99999 PR PBB SHADOW E&M-EST. PATIENT-LVL III: CPT | Mod: PBBFAC,,, | Performed by: INTERNAL MEDICINE

## 2020-08-07 PROCEDURE — 99999 PR PBB SHADOW E&M-EST. PATIENT-LVL III: ICD-10-PCS | Mod: PBBFAC,,, | Performed by: INTERNAL MEDICINE

## 2020-08-07 PROCEDURE — 99214 PR OFFICE/OUTPT VISIT, EST, LEVL IV, 30-39 MIN: ICD-10-PCS | Mod: S$GLB,,, | Performed by: INTERNAL MEDICINE

## 2020-08-07 PROCEDURE — 3079F DIAST BP 80-89 MM HG: CPT | Mod: CPTII,S$GLB,, | Performed by: INTERNAL MEDICINE

## 2020-08-07 PROCEDURE — 99214 OFFICE O/P EST MOD 30 MIN: CPT | Mod: S$GLB,,, | Performed by: INTERNAL MEDICINE

## 2020-08-07 PROCEDURE — 1159F MED LIST DOCD IN RCRD: CPT | Mod: S$GLB,,, | Performed by: INTERNAL MEDICINE

## 2020-08-07 PROCEDURE — 3074F SYST BP LT 130 MM HG: CPT | Mod: CPTII,S$GLB,, | Performed by: INTERNAL MEDICINE

## 2020-08-07 PROCEDURE — 1159F PR MEDICATION LIST DOCUMENTED IN MEDICAL RECORD: ICD-10-PCS | Mod: S$GLB,,, | Performed by: INTERNAL MEDICINE

## 2020-08-07 NOTE — PROGRESS NOTES
Subjective:     Ervin Hernandez is a 65 y.o. male with hypertension and hypercholesterolemia. He is morbidly obese. He has a strong family history for coronary artery disease. In 1995 he was told he had mitral valve prolapse. He has obsessive compulsive issues. On 9/5/2019 he had an Echo that revealed normal left ventricular size and systolic function. There was mild LVH and the LA was mildly dilated LA. There was severe AS with an MARCELA of 1.0 cm2 and a peak velocity of 4.6 m/s and a mean gradient of 52 mmHg. He totally denied any exertional chest pain or exertional dyspnea when I saw him on 11/27/2019. However, when I saw him on 3/4/2020 he had began experience mild to moderate exertional shortness of breath. He was advised evaluation for AVR. He was hesitant but after discussing the issues with his brother and Dr. Merary Phillip he changed his mind and wants to proceed. He denies palpitations or weak spells. Feeling well overall.      Hypertension  This is a chronic problem. The current episode started more than 1 year ago. The problem is unchanged. The problem is controlled (usually 120-130/65-75 mmHg when checked). Associated symptoms include shortness of breath. Pertinent negatives include no anxiety, blurred vision, chest pain, headaches, malaise/fatigue, neck pain, orthopnea, palpitations, peripheral edema, PND or sweats. There is no history of chronic renal disease.   Hyperlipidemia  This is a chronic problem. The current episode started more than 1 year ago. The problem is controlled. Recent lipid tests were reviewed and are normal. Exacerbating diseases include obesity. He has no history of chronic renal disease, diabetes, hypothyroidism, liver disease or nephrotic syndrome. Associated symptoms include shortness of breath. Pertinent negatives include no chest pain, focal sensory loss, focal weakness, leg pain or myalgias.       Review of Systems   Constitution: Negative for chills, fever and  malaise/fatigue.   HENT: Negative for nosebleeds.    Eyes: Negative for blurred vision, double vision, vision loss in left eye and vision loss in right eye.   Cardiovascular: Positive for dyspnea on exertion. Negative for chest pain, claudication, irregular heartbeat, leg swelling, near-syncope, orthopnea, palpitations, paroxysmal nocturnal dyspnea and syncope.   Respiratory: Positive for shortness of breath. Negative for cough, hemoptysis and wheezing.    Endocrine: Negative for cold intolerance and heat intolerance.   Hematologic/Lymphatic: Negative for bleeding problem. Does not bruise/bleed easily.   Skin: Negative for color change and rash.   Musculoskeletal: Negative for back pain, falls, muscle weakness, myalgias and neck pain.   Gastrointestinal: Negative for heartburn, hematemesis, hematochezia, hemorrhoids, jaundice, melena, nausea and vomiting.   Genitourinary: Negative for dysuria and hematuria.   Neurological: Negative for dizziness, focal weakness, headaches, light-headedness, loss of balance, numbness, vertigo and weakness.   Psychiatric/Behavioral: Negative for altered mental status, depression and memory loss. The patient is not nervous/anxious.    Allergic/Immunologic: Negative for hives and persistent infections.       Current Outpatient Medications on File Prior to Visit   Medication Sig Dispense Refill    ascorbic acid, vitamin C, (VITAMIN C) 1000 MG tablet Take 1,000 mg by mouth once daily.      atenolol (TENORMIN) 50 MG tablet Take 1 tablet (50 mg total) by mouth once daily. 90 tablet 3    calcium carbonate (OS-MALISSA) 600 mg calcium (1,500 mg) Tab Take 1,200 mg by mouth once.      diazePAM (VALIUM) 10 MG Tab Take 10 mg by mouth once daily.       fluvoxaMINE (LUVOX) 100 MG tablet Take 100 mg by mouth 2 (two) times daily.       folic acid/multivit-min/lutein (CENTRUM SILVER ORAL) Take by mouth.      hydroCHLOROthiazide (HYDRODIURIL) 25 MG tablet Take 25 mg by mouth once daily.       "magnesium 30 mg Tab Take 400 mg by mouth once daily.      omega-3 fatty acids/fish oil (FISH OIL-OMEGA-3 FATTY ACIDS) 300-1,000 mg capsule Take by mouth once daily.      QUEtiapine (SEROQUEL) 50 MG tablet Take 100 mg by mouth every evening.       simvastatin (ZOCOR) 10 MG tablet Take 10 mg by mouth every evening.      telmisartan (MICARDIS) 80 MG Tab Take 80 mg by mouth once daily.        No current facility-administered medications on file prior to visit.        /85 (BP Location: Right arm, Patient Position: Sitting)   Pulse 82   Ht 5' 9" (1.753 m)   Wt (!) 137 kg (302 lb 0.5 oz)   BMI 44.60 kg/m²       Objective:     Physical Exam   Constitutional: He is oriented to person, place, and time. He appears well-developed and well-nourished.  Non-toxic appearance. No distress.   HENT:   Head: Normocephalic and atraumatic.   Nose: Nose normal.   Eyes: Right eye exhibits no discharge. Left eye exhibits no discharge. Right conjunctiva is not injected. Left conjunctiva is not injected. Right pupil is round. Left pupil is round. Pupils are equal.   Neck: Neck supple. No JVD present. Carotid bruit is not present. No thyromegaly present.   Cardiovascular: Normal rate, regular rhythm, S1 normal and S2 normal.  No extrasystoles are present. PMI is not displaced. Exam reveals no gallop.   Murmur heard.   Harsh midsystolic murmur is present with a grade of 4/6 at the upper right sternal border radiating to the neck.  Pulses:       Radial pulses are 2+ on the right side and 2+ on the left side.        Femoral pulses are 2+ on the right side and 2+ on the left side.       Dorsalis pedis pulses are 2+ on the right side and 2+ on the left side.        Posterior tibial pulses are 2+ on the right side and 2+ on the left side.   Pulmonary/Chest: Effort normal and breath sounds normal.   Abdominal: Soft. Normal appearance. There is no hepatosplenomegaly. There is no abdominal tenderness.   Musculoskeletal:      Right ankle: " "He exhibits no swelling, no ecchymosis and no deformity.      Left ankle: He exhibits no swelling, no ecchymosis and no deformity.   Lymphadenopathy:        Head (right side): No submandibular adenopathy present.        Head (left side): No submandibular adenopathy present.     He has no cervical adenopathy.   Neurological: He is alert and oriented to person, place, and time. He is not disoriented. No cranial nerve deficit or sensory deficit.   Skin: Skin is warm, dry and intact. No rash noted. He is not diaphoretic.   Psychiatric: He has a normal mood and affect. His speech is normal and behavior is normal. Judgment and thought content normal. Cognition and memory are normal.       Assessment:     1. Nonrheumatic aortic valve stenosis    2. Essential hypertension    3. Hypercholesterolemia    4. Morbid obesity    5. Family history of ischemic heart disease        Plan:     1. Aortic Stenosis    9/5/2019: Echo: Normal left ventricular size and systolic function. Mild LVH. Mildly dilated LA. Severe AS - 1.0 cm2 - 4.6 m/s - MG 52 mmHg.   11/27/2019: "Totally denies symptoms".   3/4/2020: Mild exertional dyspnea.   Advised to begin evaluation for AVR or TAVR.   He wants to proceed.    2. Hypertension   1995: Diagnosed.   On atenolol 50 mg Q24, telmisartan 80 mg Q24 and hctz 25 mg Q24.   Advised to keep log at home.   Appears well controlled.    3. Hypercholesterolemia   1995: Began statin.   On simvastatin 10 mg Q24.   Tells me well controlled.    4. Morbid Obesity   9/5/2019: Weight 127 kg. BMI 41.   3/4/2020: Weight 136 kg. BMI 44.    5. Family History for Ischemic Heart Disease   Father MI age 63.   Brother MI age 36.    6. Obsessive Compulsive Disorder   Life long issue.    7. Primary Care   Dr. Merary Phillip.    The planned procedure was discussed in detail with the patient and the family members present. Risk, benefit and alternatives were reviewed. All questions answered. Consent was then signed.    If " further questions or concerns arise the patient was encouraged to contact me prior to the planned procedure.     F/u 1 month.    Umesh Link M.D.

## 2020-10-21 ENCOUNTER — HOSPITAL ENCOUNTER (OUTPATIENT)
Dept: PREADMISSION TESTING | Facility: OTHER | Age: 65
Discharge: HOME | End: 2020-10-21
Attending: ANESTHESIOLOGY
Payer: MEDICARE

## 2020-10-21 VITALS
BODY MASS INDEX: 45.47 KG/M2 | HEART RATE: 65 BPM | OXYGEN SATURATION: 95 % | TEMPERATURE: 98 F | SYSTOLIC BLOOD PRESSURE: 124 MMHG | HEIGHT: 69 IN | DIASTOLIC BLOOD PRESSURE: 80 MMHG | WEIGHT: 307 LBS

## 2020-10-21 DIAGNOSIS — Z01.818 PREOP TESTING: Primary | ICD-10-CM

## 2020-10-21 DIAGNOSIS — Z01.818 PREOP TESTING: ICD-10-CM

## 2020-10-21 LAB
ANION GAP SERPL CALC-SCNC: 9 MMOL/L (ref 8–16)
BASOPHILS # BLD AUTO: 0.03 K/UL (ref 0–0.2)
BASOPHILS NFR BLD: 0.7 % (ref 0–1.9)
BUN SERPL-MCNC: 12 MG/DL (ref 8–23)
CALCIUM SERPL-MCNC: 9.2 MG/DL (ref 8.7–10.5)
CHLORIDE SERPL-SCNC: 104 MMOL/L (ref 95–110)
CO2 SERPL-SCNC: 29 MMOL/L (ref 23–29)
CREAT SERPL-MCNC: 1 MG/DL (ref 0.5–1.4)
DIFFERENTIAL METHOD: ABNORMAL
EOSINOPHIL # BLD AUTO: 0.2 K/UL (ref 0–0.5)
EOSINOPHIL NFR BLD: 3.5 % (ref 0–8)
ERYTHROCYTE [DISTWIDTH] IN BLOOD BY AUTOMATED COUNT: 14.9 % (ref 11.5–14.5)
EST. GFR  (AFRICAN AMERICAN): >60 ML/MIN/1.73 M^2
EST. GFR  (NON AFRICAN AMERICAN): >60 ML/MIN/1.73 M^2
GLUCOSE SERPL-MCNC: 103 MG/DL (ref 70–110)
HCT VFR BLD AUTO: 42.2 % (ref 40–54)
HGB BLD-MCNC: 13.2 G/DL (ref 14–18)
IMM GRANULOCYTES # BLD AUTO: 0.01 K/UL (ref 0–0.04)
IMM GRANULOCYTES NFR BLD AUTO: 0.2 % (ref 0–0.5)
LYMPHOCYTES # BLD AUTO: 1.3 K/UL (ref 1–4.8)
LYMPHOCYTES NFR BLD: 30.9 % (ref 18–48)
MCH RBC QN AUTO: 28.4 PG (ref 27–31)
MCHC RBC AUTO-ENTMCNC: 31.3 G/DL (ref 32–36)
MCV RBC AUTO: 91 FL (ref 82–98)
MONOCYTES # BLD AUTO: 0.5 K/UL (ref 0.3–1)
MONOCYTES NFR BLD: 11.1 % (ref 4–15)
NEUTROPHILS # BLD AUTO: 2.3 K/UL (ref 1.8–7.7)
NEUTROPHILS NFR BLD: 53.6 % (ref 38–73)
NRBC BLD-RTO: 0 /100 WBC
PLATELET # BLD AUTO: 125 K/UL (ref 150–350)
PMV BLD AUTO: 8.7 FL (ref 9.2–12.9)
POTASSIUM SERPL-SCNC: 4.2 MMOL/L (ref 3.5–5.1)
RBC # BLD AUTO: 4.65 M/UL (ref 4.6–6.2)
SODIUM SERPL-SCNC: 142 MMOL/L (ref 136–145)
WBC # BLD AUTO: 4.24 K/UL (ref 3.9–12.7)

## 2020-10-21 PROCEDURE — 80048 BASIC METABOLIC PNL TOTAL CA: CPT

## 2020-10-21 PROCEDURE — U0003 INFECTIOUS AGENT DETECTION BY NUCLEIC ACID (DNA OR RNA); SEVERE ACUTE RESPIRATORY SYNDROME CORONAVIRUS 2 (SARS-COV-2) (CORONAVIRUS DISEASE [COVID-19]), AMPLIFIED PROBE TECHNIQUE, MAKING USE OF HIGH THROUGHPUT TECHNOLOGIES AS DESCRIBED BY CMS-2020-01-R: HCPCS

## 2020-10-21 PROCEDURE — 85025 COMPLETE CBC W/AUTO DIFF WBC: CPT

## 2020-10-21 PROCEDURE — 36415 COLL VENOUS BLD VENIPUNCTURE: CPT

## 2020-10-21 RX ORDER — UBIDECARENONE 30 MG
100 CAPSULE ORAL DAILY
COMMUNITY

## 2020-10-21 RX ORDER — DIAZEPAM 5 MG/1
5 TABLET ORAL
Status: CANCELLED | OUTPATIENT
Start: 2020-10-23 | End: 2020-10-23

## 2020-10-21 RX ORDER — DIPHENHYDRAMINE HCL 25 MG
25 CAPSULE ORAL ONCE
Status: CANCELLED | OUTPATIENT
Start: 2020-10-23

## 2020-10-21 RX ORDER — NITROGLYCERIN 0.4 MG/1
0.4 TABLET SUBLINGUAL EVERY 5 MIN PRN
Status: CANCELLED | OUTPATIENT
Start: 2020-10-23

## 2020-10-21 RX ORDER — NAPROXEN SODIUM 220 MG/1
81 TABLET, FILM COATED ORAL
Status: CANCELLED | OUTPATIENT
Start: 2020-10-23 | End: 2020-10-23

## 2020-10-21 RX ORDER — NAPROXEN SODIUM 220 MG/1
162 TABLET, FILM COATED ORAL
Status: CANCELLED | OUTPATIENT
Start: 2020-10-23 | End: 2020-10-23

## 2020-10-21 NOTE — DISCHARGE INSTRUCTIONS
Information to Prepare you for your Surgery    PRE-ADMIT TESTING -  [phone]    [address]  [place] [facility]          Your surgery has been scheduled at [facility]. We are pleased to have the opportunity to serve you. For Further Information please call [phone].    On the day of surgery please report to the Information Desk on the 1st floor.    · CONTACT YOUR PHYSICIAN'S OFFICE THE DAY PRIOR TO YOUR SURGERY TO OBTAIN YOUR ARRIVAL TIME.     · The evening before surgery do not eat anything after 9 p.m. ( this includes hard candy, chewing gum and mints).  You may only have GATORADE, POWERADE AND WATER  from 9 p.m. until you leave your home.   DO NOT DRINK ANY LIQUIDS ON THE WAY TO THE HOSPITAL.      SPECIAL MEDICATION INSTRUCTIONS: TAKE medications checked off by the Anesthesiologist on your Medication List.    Angiogram Patients: Take medications as instructed by your physician, including aspirin.     Surgery Patients:    If you take ASPIRIN - Your PHYSICIAN/SURGEON will need to inform you IF/OR when you need to stop taking aspirin prior to your surgery.     Do Not take any medications containing IBUPROFEN.  Do Not Wear any make-up or dark nail polish   (especially eye make-up) to surgery. If you come to surgery with makeup on you will be required to remove the makeup or nail polish.    Do not shave your surgical area at least 5 days prior to your surgery. The surgical prep will be performed at the hospital according to Infection Control regulations.    Leave all valuables at home.   Do Not wear any jewelry or watches, including any metal in body piercings. Jewelry must be removed prior to coming to the hospital.  There is a possibility that rings that are unable to be removed may be cut off if they are on the surgical extremity.    Contact Lens must be removed before surgery. Either do not wear the contact lens or bring a case and solution for  storage.  Please bring a container for eyeglasses or dentures as required.  Bring any paperwork your physician has provided, such as consent forms,  history and physicals, doctor's orders, etc.   Bring comfortable clothes that are loose fitting to wear upon discharge. Take into consideration the type of surgery being performed.  Maintain your diet as advised per your physician the day prior to surgery.      Adequate rest the night before surgery is advised.   Park in the Parking lot behind the hospital or in the [facility] across the street from the parking lot. Parking is complimentary.  If you will be discharged the same day as your procedure, please arrange for a responsible adult to drive you home or to accompany you if traveling by taxi.   YOU WILL NOT BE PERMITTED TO DRIVE OR TO LEAVE THE HOSPITAL ALONE AFTER SURGERY.   If you are being discharged the same day, it is strongly recommended that you arrange for someone to remain with you for the first 24 hrs following your surgery.    The Surgeon will speak to your family/visitor after your surgery regarding the outcome of your surgery and post op care.  The Surgeon may speak to you after your surgery, but there is a possibility you may not remember the details.  Please check with your family members regarding the conversation with the Surgeon.    We strongly recommend whoever is bringing you home be present for discharge instructions.  This will ensure a thorough understanding for your post op home care.    ALL CHILDREN MUST ALWAYS BE ACCOMPANIED BY AN ADULT.    Visitors-Refer to current Visitor policy handouts.    Thank you for your cooperation.  The Staff of [facility].                Bathing Instructions with Hibiclens     Shower the evening before and morning of your procedure with Hibiclens:   Wash your face with water and your regular face wash/soap   Apply Hibiclens directly on your skin or on a wet washcloth and wash  gently. When showering: Move away from the shower stream when applying Hibiclens to avoid rinsing off too soon.   Rinse thoroughly with warm water   Do not dilute Hibiclens         Dry off as usual, do not use any deodorant, powder, body lotions, perfume, after shave or cologne.

## 2020-10-22 LAB — SARS-COV-2 RNA RESP QL NAA+PROBE: NOT DETECTED

## 2020-10-23 ENCOUNTER — HOSPITAL ENCOUNTER (OUTPATIENT)
Facility: OTHER | Age: 65
Discharge: HOME | End: 2020-10-23
Attending: INTERNAL MEDICINE | Admitting: INTERNAL MEDICINE
Payer: MEDICARE

## 2020-10-23 VITALS
RESPIRATION RATE: 20 BRPM | DIASTOLIC BLOOD PRESSURE: 63 MMHG | SYSTOLIC BLOOD PRESSURE: 136 MMHG | HEIGHT: 69 IN | TEMPERATURE: 98 F | OXYGEN SATURATION: 97 % | BODY MASS INDEX: 45.47 KG/M2 | WEIGHT: 307 LBS | HEART RATE: 66 BPM

## 2020-10-23 DIAGNOSIS — I35.0 NONRHEUMATIC AORTIC VALVE STENOSIS: Primary | ICD-10-CM

## 2020-10-23 DIAGNOSIS — Z01.818 PREOP TESTING: ICD-10-CM

## 2020-10-23 DIAGNOSIS — I35.0 AORTIC VALVE STENOSIS, ETIOLOGY OF CARDIAC VALVE DISEASE UNSPECIFIED: ICD-10-CM

## 2020-10-23 PROCEDURE — C1894 INTRO/SHEATH, NON-LASER: HCPCS | Performed by: INTERNAL MEDICINE

## 2020-10-23 PROCEDURE — C1751 CATH, INF, PER/CENT/MIDLINE: HCPCS | Performed by: INTERNAL MEDICINE

## 2020-10-23 PROCEDURE — 63600175 PHARM REV CODE 636 W HCPCS: Performed by: INTERNAL MEDICINE

## 2020-10-23 PROCEDURE — 99153 MOD SED SAME PHYS/QHP EA: CPT | Performed by: INTERNAL MEDICINE

## 2020-10-23 PROCEDURE — 93460 R&L HRT ART/VENTRICLE ANGIO: CPT | Performed by: INTERNAL MEDICINE

## 2020-10-23 PROCEDURE — 99152 MOD SED SAME PHYS/QHP 5/>YRS: CPT | Performed by: INTERNAL MEDICINE

## 2020-10-23 PROCEDURE — C1887 CATHETER, GUIDING: HCPCS | Performed by: INTERNAL MEDICINE

## 2020-10-23 PROCEDURE — 25000003 PHARM REV CODE 250: Performed by: INTERNAL MEDICINE

## 2020-10-23 PROCEDURE — 25500020 PHARM REV CODE 255: Performed by: INTERNAL MEDICINE

## 2020-10-23 PROCEDURE — C1769 GUIDE WIRE: HCPCS | Performed by: INTERNAL MEDICINE

## 2020-10-23 PROCEDURE — C1760 CLOSURE DEV, VASC: HCPCS | Performed by: INTERNAL MEDICINE

## 2020-10-23 RX ORDER — NAPROXEN SODIUM 220 MG/1
162 TABLET, FILM COATED ORAL
Status: DISCONTINUED | OUTPATIENT
Start: 2020-10-23 | End: 2020-10-23 | Stop reason: HOSPADM

## 2020-10-23 RX ORDER — SODIUM CHLORIDE 9 MG/ML
50 INJECTION, SOLUTION INTRAVENOUS CONTINUOUS
Status: DISPENSED | OUTPATIENT
Start: 2020-10-23 | End: 2020-10-23

## 2020-10-23 RX ORDER — MIDAZOLAM HYDROCHLORIDE 1 MG/ML
INJECTION, SOLUTION INTRAMUSCULAR; INTRAVENOUS
Status: DISCONTINUED | OUTPATIENT
Start: 2020-10-23 | End: 2020-10-23 | Stop reason: HOSPADM

## 2020-10-23 RX ORDER — NAPROXEN SODIUM 220 MG/1
81 TABLET, FILM COATED ORAL
Status: DISCONTINUED | OUTPATIENT
Start: 2020-10-23 | End: 2020-10-23 | Stop reason: HOSPADM

## 2020-10-23 RX ORDER — DIAZEPAM 5 MG/1
5 TABLET ORAL
Status: COMPLETED | OUTPATIENT
Start: 2020-10-23 | End: 2020-10-23

## 2020-10-23 RX ORDER — NITROGLYCERIN 0.4 MG/1
0.4 TABLET SUBLINGUAL EVERY 5 MIN PRN
Status: DISCONTINUED | OUTPATIENT
Start: 2020-10-23 | End: 2020-10-23 | Stop reason: HOSPADM

## 2020-10-23 RX ORDER — HEPARIN SOD,PORCINE/0.9 % NACL 1000/500ML
INTRAVENOUS SOLUTION INTRAVENOUS
Status: DISCONTINUED | OUTPATIENT
Start: 2020-10-23 | End: 2020-10-23 | Stop reason: HOSPADM

## 2020-10-23 RX ORDER — MAG HYDROX/ALUMINUM HYD/SIMETH 200-200-20
30 SUSPENSION, ORAL (FINAL DOSE FORM) ORAL
Status: DISCONTINUED | OUTPATIENT
Start: 2020-10-23 | End: 2020-10-23 | Stop reason: HOSPADM

## 2020-10-23 RX ORDER — FENTANYL CITRATE 50 UG/ML
INJECTION, SOLUTION INTRAMUSCULAR; INTRAVENOUS
Status: DISCONTINUED | OUTPATIENT
Start: 2020-10-23 | End: 2020-10-23 | Stop reason: HOSPADM

## 2020-10-23 RX ORDER — ACETAMINOPHEN 325 MG/1
650 TABLET ORAL EVERY 4 HOURS PRN
Status: DISCONTINUED | OUTPATIENT
Start: 2020-10-23 | End: 2020-10-23 | Stop reason: HOSPADM

## 2020-10-23 RX ORDER — HYDROCODONE BITARTRATE AND ACETAMINOPHEN 5; 325 MG/1; MG/1
1 TABLET ORAL EVERY 4 HOURS PRN
Status: DISCONTINUED | OUTPATIENT
Start: 2020-10-23 | End: 2020-10-23 | Stop reason: HOSPADM

## 2020-10-23 RX ORDER — HYDROCODONE BITARTRATE AND ACETAMINOPHEN 10; 325 MG/1; MG/1
1 TABLET ORAL EVERY 4 HOURS PRN
Status: DISCONTINUED | OUTPATIENT
Start: 2020-10-23 | End: 2020-10-23 | Stop reason: HOSPADM

## 2020-10-23 RX ORDER — DIPHENHYDRAMINE HYDROCHLORIDE 50 MG/ML
25 INJECTION INTRAMUSCULAR; INTRAVENOUS EVERY 6 HOURS PRN
Status: DISCONTINUED | OUTPATIENT
Start: 2020-10-23 | End: 2020-10-23 | Stop reason: HOSPADM

## 2020-10-23 RX ORDER — ONDANSETRON 8 MG/1
8 TABLET, ORALLY DISINTEGRATING ORAL EVERY 8 HOURS PRN
Status: DISCONTINUED | OUTPATIENT
Start: 2020-10-23 | End: 2020-10-23 | Stop reason: HOSPADM

## 2020-10-23 RX ORDER — NAPROXEN SODIUM 220 MG/1
81 TABLET, FILM COATED ORAL DAILY
COMMUNITY

## 2020-10-23 RX ORDER — DIPHENHYDRAMINE HCL 25 MG
25 CAPSULE ORAL ONCE
Status: COMPLETED | OUTPATIENT
Start: 2020-10-23 | End: 2020-10-23

## 2020-10-23 RX ADMIN — DIPHENHYDRAMINE HYDROCHLORIDE 25 MG: 25 CAPSULE ORAL at 07:10

## 2020-10-23 RX ADMIN — DIAZEPAM 5 MG: 5 TABLET ORAL at 07:10

## 2020-11-05 ENCOUNTER — NURSE TRIAGE (OUTPATIENT)
Dept: ADMINISTRATIVE | Facility: CLINIC | Age: 65
End: 2020-11-05

## 2020-11-05 NOTE — TELEPHONE ENCOUNTER
Covid-19 post procedure follow up call attempted, no contact made. Left VM message.     Reason for Disposition   Message left on identified voicemail    Additional Information   Negative: Caller has already spoken with the PCP (or office), and has no further questions   Negative: Caller has already spoken with another triager and has no further questions   Negative: Caller has already spoken with another triager or PCP (or office), and has further questions and triager able to answer questions.   Negative: Busy signal.  First attempt to contact caller.  Follow-up call scheduled within 15 minutes.   Negative: No answer.  First attempt to contact caller.  Follow-up call scheduled within 15 minutes.    Protocols used: NO CONTACT OR DUPLICATE CONTACT CALL-A-OH

## 2020-12-21 DIAGNOSIS — N18.9 CHRONIC KIDNEY DISEASE, UNSPECIFIED CKD STAGE: ICD-10-CM

## 2020-12-21 DIAGNOSIS — I35.0 AORTIC VALVE STENOSIS, ETIOLOGY OF CARDIAC VALVE DISEASE UNSPECIFIED: Primary | ICD-10-CM

## 2021-01-04 ENCOUNTER — HOSPITAL ENCOUNTER (OUTPATIENT)
Dept: RADIOLOGY | Facility: HOSPITAL | Age: 66
Discharge: HOME | End: 2021-01-04
Attending: INTERNAL MEDICINE
Payer: MEDICARE

## 2021-01-04 ENCOUNTER — EDUCATION (OUTPATIENT)
Dept: CARDIOLOGY | Facility: CLINIC | Age: 66
End: 2021-01-04

## 2021-01-04 ENCOUNTER — OFFICE VISIT (OUTPATIENT)
Dept: CARDIOLOGY | Facility: CLINIC | Age: 66
End: 2021-01-04
Payer: MEDICARE

## 2021-01-04 VITALS
BODY MASS INDEX: 43.14 KG/M2 | HEART RATE: 75 BPM | HEIGHT: 69 IN | DIASTOLIC BLOOD PRESSURE: 82 MMHG | WEIGHT: 291.25 LBS | SYSTOLIC BLOOD PRESSURE: 154 MMHG | OXYGEN SATURATION: 93 %

## 2021-01-04 DIAGNOSIS — I35.0 AORTIC VALVE STENOSIS, ETIOLOGY OF CARDIAC VALVE DISEASE UNSPECIFIED: Primary | ICD-10-CM

## 2021-01-04 DIAGNOSIS — N18.9 CHRONIC KIDNEY DISEASE, UNSPECIFIED CKD STAGE: ICD-10-CM

## 2021-01-04 DIAGNOSIS — I10 HYPERTENSION, UNSPECIFIED TYPE: ICD-10-CM

## 2021-01-04 DIAGNOSIS — E78.5 HYPERLIPIDEMIA, UNSPECIFIED HYPERLIPIDEMIA TYPE: ICD-10-CM

## 2021-01-04 DIAGNOSIS — I35.0 SEVERE AORTIC STENOSIS: Primary | ICD-10-CM

## 2021-01-04 DIAGNOSIS — E66.01 MORBID OBESITY: ICD-10-CM

## 2021-01-04 DIAGNOSIS — I35.0 AORTIC STENOSIS: ICD-10-CM

## 2021-01-04 DIAGNOSIS — I35.0 AORTIC VALVE STENOSIS, ETIOLOGY OF CARDIAC VALVE DISEASE UNSPECIFIED: ICD-10-CM

## 2021-01-04 PROCEDURE — 3008F PR BODY MASS INDEX (BMI) DOCUMENTED: ICD-10-PCS | Mod: CPTII,S$GLB,, | Performed by: INTERNAL MEDICINE

## 2021-01-04 PROCEDURE — 71275 CT ANGIOGRAPHY CHEST: CPT | Mod: 26,,, | Performed by: RADIOLOGY

## 2021-01-04 PROCEDURE — 3074F PR MOST RECENT SYSTOLIC BLOOD PRESSURE < 130 MM HG: ICD-10-PCS | Mod: CPTII,S$GLB,, | Performed by: INTERNAL MEDICINE

## 2021-01-04 PROCEDURE — 3079F DIAST BP 80-89 MM HG: CPT | Mod: CPTII,S$GLB,, | Performed by: INTERNAL MEDICINE

## 2021-01-04 PROCEDURE — 99999 PR PBB SHADOW E&M-EST. PATIENT-LVL III: CPT | Mod: PBBFAC,,,

## 2021-01-04 PROCEDURE — 71275 CT ANGIOGRAPHY CHEST: CPT | Mod: TC

## 2021-01-04 PROCEDURE — 74174 CTA ABD&PLVS W/CONTRAST: CPT | Mod: TC

## 2021-01-04 PROCEDURE — 3079F PR MOST RECENT DIASTOLIC BLOOD PRESSURE 80-89 MM HG: ICD-10-PCS | Mod: CPTII,S$GLB,, | Performed by: INTERNAL MEDICINE

## 2021-01-04 PROCEDURE — 71275 CTA CARDIAC TAVR_PARTNERS (XPD): ICD-10-PCS | Mod: 26,,, | Performed by: RADIOLOGY

## 2021-01-04 PROCEDURE — 3008F BODY MASS INDEX DOCD: CPT | Mod: CPTII,S$GLB,, | Performed by: INTERNAL MEDICINE

## 2021-01-04 PROCEDURE — 3074F SYST BP LT 130 MM HG: CPT | Mod: CPTII,S$GLB,, | Performed by: INTERNAL MEDICINE

## 2021-01-04 PROCEDURE — 74174 CTA ABD&PLVS W/CONTRAST: CPT | Mod: 26,,, | Performed by: RADIOLOGY

## 2021-01-04 PROCEDURE — 25500020 PHARM REV CODE 255: Performed by: INTERNAL MEDICINE

## 2021-01-04 PROCEDURE — 99999 PR PBB SHADOW E&M-EST. PATIENT-LVL III: ICD-10-PCS | Mod: PBBFAC,,,

## 2021-01-04 PROCEDURE — 99204 OFFICE O/P NEW MOD 45 MIN: CPT | Mod: S$GLB,,, | Performed by: INTERNAL MEDICINE

## 2021-01-04 PROCEDURE — 74174 CTA CARDIAC TAVR_PARTNERS (XPD): ICD-10-PCS | Mod: 26,,, | Performed by: RADIOLOGY

## 2021-01-04 PROCEDURE — 1159F MED LIST DOCD IN RCRD: CPT | Mod: S$GLB,,, | Performed by: INTERNAL MEDICINE

## 2021-01-04 PROCEDURE — 1159F PR MEDICATION LIST DOCUMENTED IN MEDICAL RECORD: ICD-10-PCS | Mod: S$GLB,,, | Performed by: INTERNAL MEDICINE

## 2021-01-04 PROCEDURE — 99204 PR OFFICE/OUTPT VISIT, NEW, LEVL IV, 45-59 MIN: ICD-10-PCS | Mod: S$GLB,,, | Performed by: INTERNAL MEDICINE

## 2021-01-04 RX ORDER — ATORVASTATIN CALCIUM 40 MG/1
40 TABLET, FILM COATED ORAL NIGHTLY
COMMUNITY

## 2021-01-04 RX ORDER — SODIUM CHLORIDE 9 MG/ML
INJECTION, SOLUTION INTRAVENOUS CONTINUOUS
Status: CANCELLED | OUTPATIENT
Start: 2021-01-04 | End: 2021-01-04

## 2021-01-04 RX ORDER — DIPHENHYDRAMINE HCL 25 MG
50 CAPSULE ORAL ONCE
Status: CANCELLED | OUTPATIENT
Start: 2021-01-04 | End: 2021-01-04

## 2021-01-04 RX ADMIN — IOHEXOL 100 ML: 350 INJECTION, SOLUTION INTRAVENOUS at 11:01

## 2021-01-05 LAB
CREAT SERPL-MCNC: 1 MG/DL (ref 0.5–1.4)
SAMPLE: NORMAL

## 2021-01-07 ENCOUNTER — HOSPITAL ENCOUNTER (OUTPATIENT)
Dept: CARDIOLOGY | Facility: HOSPITAL | Age: 66
Discharge: HOME | End: 2021-01-07
Attending: INTERNAL MEDICINE
Payer: MEDICARE

## 2021-01-07 VITALS
HEIGHT: 69 IN | HEART RATE: 72 BPM | DIASTOLIC BLOOD PRESSURE: 78 MMHG | BODY MASS INDEX: 43.1 KG/M2 | WEIGHT: 291 LBS | SYSTOLIC BLOOD PRESSURE: 120 MMHG

## 2021-01-07 DIAGNOSIS — I35.0 AORTIC VALVE STENOSIS, ETIOLOGY OF CARDIAC VALVE DISEASE UNSPECIFIED: ICD-10-CM

## 2021-01-07 LAB
ASCENDING AORTA: 3.27 CM
AV INDEX (PROSTH): 0.25
AV MEAN GRADIENT: 56 MMHG
AV PEAK GRADIENT: 94 MMHG
AV VALVE AREA: 0.96 CM2
AV VELOCITY RATIO: 0.26
BSA FOR ECHO PROCEDURE: 2.53 M2
CV ECHO LV RWT: 0.44 CM
DOP CALC AO PEAK VEL: 4.86 M/S
DOP CALC AO VTI: 100.33 CM
DOP CALC LVOT AREA: 3.9 CM2
DOP CALC LVOT DIAMETER: 2.22 CM
DOP CALC LVOT PEAK VEL: 1.26 M/S
DOP CALC LVOT STROKE VOLUME: 96.72 CM3
DOP CALCLVOT PEAK VEL VTI: 25 CM
E WAVE DECELERATION TIME: 325.63 MSEC
E/A RATIO: 1.09
E/E' RATIO: 14.2 M/S
ECHO LV POSTERIOR WALL: 1.1 CM (ref 0.6–1.1)
FRACTIONAL SHORTENING: 31 % (ref 28–44)
INTERVENTRICULAR SEPTUM: 1.11 CM (ref 0.6–1.1)
LA MAJOR: 6.41 CM
LA MINOR: 6.38 CM
LA WIDTH: 3.94 CM
LEFT ATRIUM SIZE: 4.16 CM
LEFT ATRIUM VOLUME INDEX MOD: 27.2 ML/M2
LEFT ATRIUM VOLUME INDEX: 36.8 ML/M2
LEFT ATRIUM VOLUME MOD: 65.78 CM3
LEFT ATRIUM VOLUME: 89.09 CM3
LEFT INTERNAL DIMENSION IN SYSTOLE: 3.43 CM (ref 2.1–4)
LEFT VENTRICLE DIASTOLIC VOLUME INDEX: 52.11 ML/M2
LEFT VENTRICLE DIASTOLIC VOLUME: 126.25 ML
LEFT VENTRICLE MASS INDEX: 86 G/M2
LEFT VENTRICLE SYSTOLIC VOLUME INDEX: 20 ML/M2
LEFT VENTRICLE SYSTOLIC VOLUME: 48.43 ML
LEFT VENTRICULAR INTERNAL DIMENSION IN DIASTOLE: 5 CM (ref 3.5–6)
LEFT VENTRICULAR MASS: 208.44 G
LV LATERAL E/E' RATIO: 11.83 M/S
LV SEPTAL E/E' RATIO: 17.75 M/S
MV A" WAVE DURATION": 12.56 MSEC
MV PEAK A VEL: 0.65 M/S
MV PEAK E VEL: 0.71 M/S
PISA TR MAX VEL: 2.42 M/S
PULM VEIN S/D RATIO: 1.21
PV PEAK D VEL: 0.43 M/S
PV PEAK S VEL: 0.52 M/S
RA MAJOR: 3.98 CM
RA PRESSURE: 3 MMHG
RA WIDTH: 2.62 CM
RIGHT VENTRICULAR END-DIASTOLIC DIMENSION: 3.14 CM
SINUS: 3.91 CM
STJ: 2.89 CM
TDI LATERAL: 0.06 M/S
TDI SEPTAL: 0.04 M/S
TDI: 0.05 M/S
TR MAX PG: 23 MMHG
TRICUSPID ANNULAR PLANE SYSTOLIC EXCURSION: 2.05 CM
TV REST PULMONARY ARTERY PRESSURE: 26 MMHG

## 2021-01-07 PROCEDURE — 93306 ECHO (CUPID ONLY): ICD-10-PCS | Mod: 26,,, | Performed by: INTERNAL MEDICINE

## 2021-01-07 PROCEDURE — 93306 TTE W/DOPPLER COMPLETE: CPT | Mod: 26,,, | Performed by: INTERNAL MEDICINE

## 2021-01-07 PROCEDURE — 93306 TTE W/DOPPLER COMPLETE: CPT

## 2021-01-08 ENCOUNTER — TELEPHONE (OUTPATIENT)
Dept: CARDIOTHORACIC SURGERY | Facility: CLINIC | Age: 66
End: 2021-01-08

## 2021-01-11 ENCOUNTER — OFFICE VISIT (OUTPATIENT)
Dept: CARDIOTHORACIC SURGERY | Facility: CLINIC | Age: 66
End: 2021-01-11
Payer: MEDICARE

## 2021-01-11 VITALS
TEMPERATURE: 98 F | HEART RATE: 62 BPM | DIASTOLIC BLOOD PRESSURE: 72 MMHG | SYSTOLIC BLOOD PRESSURE: 112 MMHG | WEIGHT: 294.44 LBS | BODY MASS INDEX: 43.61 KG/M2 | OXYGEN SATURATION: 96 % | HEIGHT: 69 IN

## 2021-01-11 DIAGNOSIS — I35.0 AORTIC VALVE STENOSIS, ETIOLOGY OF CARDIAC VALVE DISEASE UNSPECIFIED: ICD-10-CM

## 2021-01-11 PROCEDURE — 99215 OFFICE O/P EST HI 40 MIN: CPT | Mod: S$GLB,,, | Performed by: THORACIC SURGERY (CARDIOTHORACIC VASCULAR SURGERY)

## 2021-01-11 PROCEDURE — 99999 PR PBB SHADOW E&M-EST. PATIENT-LVL IV: ICD-10-PCS | Mod: PBBFAC,,, | Performed by: THORACIC SURGERY (CARDIOTHORACIC VASCULAR SURGERY)

## 2021-01-11 PROCEDURE — 1101F PR PT FALLS ASSESS DOC 0-1 FALLS W/OUT INJ PAST YR: ICD-10-PCS | Mod: CPTII,S$GLB,, | Performed by: THORACIC SURGERY (CARDIOTHORACIC VASCULAR SURGERY)

## 2021-01-11 PROCEDURE — 3008F BODY MASS INDEX DOCD: CPT | Mod: CPTII,S$GLB,, | Performed by: THORACIC SURGERY (CARDIOTHORACIC VASCULAR SURGERY)

## 2021-01-11 PROCEDURE — 99215 PR OFFICE/OUTPT VISIT, EST, LEVL V, 40-54 MIN: ICD-10-PCS | Mod: S$GLB,,, | Performed by: THORACIC SURGERY (CARDIOTHORACIC VASCULAR SURGERY)

## 2021-01-11 PROCEDURE — 3074F PR MOST RECENT SYSTOLIC BLOOD PRESSURE < 130 MM HG: ICD-10-PCS | Mod: CPTII,S$GLB,, | Performed by: THORACIC SURGERY (CARDIOTHORACIC VASCULAR SURGERY)

## 2021-01-11 PROCEDURE — 3008F PR BODY MASS INDEX (BMI) DOCUMENTED: ICD-10-PCS | Mod: CPTII,S$GLB,, | Performed by: THORACIC SURGERY (CARDIOTHORACIC VASCULAR SURGERY)

## 2021-01-11 PROCEDURE — 1126F AMNT PAIN NOTED NONE PRSNT: CPT | Mod: S$GLB,,, | Performed by: THORACIC SURGERY (CARDIOTHORACIC VASCULAR SURGERY)

## 2021-01-11 PROCEDURE — 3288F FALL RISK ASSESSMENT DOCD: CPT | Mod: CPTII,S$GLB,, | Performed by: THORACIC SURGERY (CARDIOTHORACIC VASCULAR SURGERY)

## 2021-01-11 PROCEDURE — 99999 PR PBB SHADOW E&M-EST. PATIENT-LVL IV: CPT | Mod: PBBFAC,,, | Performed by: THORACIC SURGERY (CARDIOTHORACIC VASCULAR SURGERY)

## 2021-01-11 PROCEDURE — 3074F SYST BP LT 130 MM HG: CPT | Mod: CPTII,S$GLB,, | Performed by: THORACIC SURGERY (CARDIOTHORACIC VASCULAR SURGERY)

## 2021-01-11 PROCEDURE — 1159F MED LIST DOCD IN RCRD: CPT | Mod: S$GLB,,, | Performed by: THORACIC SURGERY (CARDIOTHORACIC VASCULAR SURGERY)

## 2021-01-11 PROCEDURE — 3288F PR FALLS RISK ASSESSMENT DOCUMENTED: ICD-10-PCS | Mod: CPTII,S$GLB,, | Performed by: THORACIC SURGERY (CARDIOTHORACIC VASCULAR SURGERY)

## 2021-01-11 PROCEDURE — 1159F PR MEDICATION LIST DOCUMENTED IN MEDICAL RECORD: ICD-10-PCS | Mod: S$GLB,,, | Performed by: THORACIC SURGERY (CARDIOTHORACIC VASCULAR SURGERY)

## 2021-01-11 PROCEDURE — 3078F PR MOST RECENT DIASTOLIC BLOOD PRESSURE < 80 MM HG: ICD-10-PCS | Mod: CPTII,S$GLB,, | Performed by: THORACIC SURGERY (CARDIOTHORACIC VASCULAR SURGERY)

## 2021-01-11 PROCEDURE — 3078F DIAST BP <80 MM HG: CPT | Mod: CPTII,S$GLB,, | Performed by: THORACIC SURGERY (CARDIOTHORACIC VASCULAR SURGERY)

## 2021-01-11 PROCEDURE — 1126F PR PAIN SEVERITY QUANTIFIED, NO PAIN PRESENT: ICD-10-PCS | Mod: S$GLB,,, | Performed by: THORACIC SURGERY (CARDIOTHORACIC VASCULAR SURGERY)

## 2021-01-11 PROCEDURE — 1101F PT FALLS ASSESS-DOCD LE1/YR: CPT | Mod: CPTII,S$GLB,, | Performed by: THORACIC SURGERY (CARDIOTHORACIC VASCULAR SURGERY)

## 2021-01-15 RX ORDER — CLOPIDOGREL BISULFATE 75 MG/1
75 TABLET ORAL DAILY
Qty: 5 TABLET | Refills: 0 | Status: ON HOLD | OUTPATIENT
Start: 2021-01-15 | End: 2021-01-20 | Stop reason: SDUPTHER

## 2021-01-17 ENCOUNTER — LAB VISIT (OUTPATIENT)
Dept: URGENT CARE | Facility: CLINIC | Age: 66
DRG: 267 | End: 2021-01-17
Payer: MEDICARE

## 2021-01-17 ENCOUNTER — ANESTHESIA EVENT (OUTPATIENT)
Dept: MEDSURG UNIT | Facility: HOSPITAL | Age: 66
DRG: 267 | End: 2021-01-17
Payer: MEDICARE

## 2021-01-17 DIAGNOSIS — I35.0 AORTIC STENOSIS: ICD-10-CM

## 2021-01-17 PROCEDURE — U0003 INFECTIOUS AGENT DETECTION BY NUCLEIC ACID (DNA OR RNA); SEVERE ACUTE RESPIRATORY SYNDROME CORONAVIRUS 2 (SARS-COV-2) (CORONAVIRUS DISEASE [COVID-19]), AMPLIFIED PROBE TECHNIQUE, MAKING USE OF HIGH THROUGHPUT TECHNOLOGIES AS DESCRIBED BY CMS-2020-01-R: HCPCS

## 2021-01-18 LAB — SARS-COV-2 RNA RESP QL NAA+PROBE: NOT DETECTED

## 2021-01-19 ENCOUNTER — ANESTHESIA (OUTPATIENT)
Dept: MEDSURG UNIT | Facility: HOSPITAL | Age: 66
DRG: 267 | End: 2021-01-19
Payer: MEDICARE

## 2021-01-19 ENCOUNTER — HOSPITAL ENCOUNTER (INPATIENT)
Facility: HOSPITAL | Age: 66
LOS: 1 days | Discharge: HOME | DRG: 267 | End: 2021-01-20
Attending: INTERNAL MEDICINE | Admitting: INTERNAL MEDICINE
Payer: MEDICARE

## 2021-01-19 DIAGNOSIS — Z95.2 S/P TAVR (TRANSCATHETER AORTIC VALVE REPLACEMENT): Primary | ICD-10-CM

## 2021-01-19 DIAGNOSIS — I10 HYPERTENSION, UNSPECIFIED TYPE: ICD-10-CM

## 2021-01-19 DIAGNOSIS — I25.10 CORONARY ARTERY DISEASE INVOLVING NATIVE CORONARY ARTERY OF NATIVE HEART WITHOUT ANGINA PECTORIS: ICD-10-CM

## 2021-01-19 DIAGNOSIS — E78.00 HYPERCHOLESTEROLEMIA: ICD-10-CM

## 2021-01-19 DIAGNOSIS — Z95.2 S/P TAVR (TRANSCATHETER AORTIC VALVE REPLACEMENT): ICD-10-CM

## 2021-01-19 DIAGNOSIS — E66.01 MORBID OBESITY: ICD-10-CM

## 2021-01-19 DIAGNOSIS — L03.90 CELLULITIS, UNSPECIFIED CELLULITIS SITE: ICD-10-CM

## 2021-01-19 DIAGNOSIS — Z95.2 HISTORY OF TRANSCATHETER AORTIC VALVE REPLACEMENT (TAVR): ICD-10-CM

## 2021-01-19 DIAGNOSIS — I35.0 AORTIC VALVE STENOSIS, ETIOLOGY OF CARDIAC VALVE DISEASE UNSPECIFIED: ICD-10-CM

## 2021-01-19 DIAGNOSIS — N18.9 CHRONIC KIDNEY DISEASE, UNSPECIFIED CKD STAGE: ICD-10-CM

## 2021-01-19 LAB
ABO + RH BLD: NORMAL
ANION GAP SERPL CALC-SCNC: 8 MMOL/L (ref 8–16)
APTT BLDCRRT: 25.9 SEC (ref 21–32)
BASOPHILS # BLD AUTO: 0.03 K/UL (ref 0–0.2)
BASOPHILS NFR BLD: 0.6 % (ref 0–1.9)
BLD GP AB SCN CELLS X3 SERPL QL: NORMAL
BUN SERPL-MCNC: 12 MG/DL (ref 8–23)
CALCIUM SERPL-MCNC: 9.1 MG/DL (ref 8.7–10.5)
CHLORIDE SERPL-SCNC: 104 MMOL/L (ref 95–110)
CO2 SERPL-SCNC: 28 MMOL/L (ref 23–29)
CREAT SERPL-MCNC: 0.9 MG/DL (ref 0.5–1.4)
DIFFERENTIAL METHOD: ABNORMAL
EOSINOPHIL # BLD AUTO: 0.2 K/UL (ref 0–0.5)
EOSINOPHIL NFR BLD: 3.2 % (ref 0–8)
ERYTHROCYTE [DISTWIDTH] IN BLOOD BY AUTOMATED COUNT: 16.1 % (ref 11.5–14.5)
ERYTHROCYTE [DISTWIDTH] IN BLOOD BY AUTOMATED COUNT: 16.3 % (ref 11.5–14.5)
EST. GFR  (AFRICAN AMERICAN): >60 ML/MIN/1.73 M^2
EST. GFR  (NON AFRICAN AMERICAN): >60 ML/MIN/1.73 M^2
GLUCOSE SERPL-MCNC: 98 MG/DL (ref 70–110)
HCT VFR BLD AUTO: 41.5 % (ref 40–54)
HCT VFR BLD AUTO: 46.8 % (ref 40–54)
HGB BLD-MCNC: 13.3 G/DL (ref 14–18)
HGB BLD-MCNC: 14.8 G/DL (ref 14–18)
IMM GRANULOCYTES # BLD AUTO: 0.01 K/UL (ref 0–0.04)
IMM GRANULOCYTES NFR BLD AUTO: 0.2 % (ref 0–0.5)
INR PPP: 1 (ref 0.8–1.2)
LYMPHOCYTES # BLD AUTO: 1.1 K/UL (ref 1–4.8)
LYMPHOCYTES NFR BLD: 23.9 % (ref 18–48)
MCH RBC QN AUTO: 27.8 PG (ref 27–31)
MCH RBC QN AUTO: 28.1 PG (ref 27–31)
MCHC RBC AUTO-ENTMCNC: 31.6 G/DL (ref 32–36)
MCHC RBC AUTO-ENTMCNC: 32 G/DL (ref 32–36)
MCV RBC AUTO: 88 FL (ref 82–98)
MCV RBC AUTO: 88 FL (ref 82–98)
MONOCYTES # BLD AUTO: 0.4 K/UL (ref 0.3–1)
MONOCYTES NFR BLD: 8.7 % (ref 4–15)
NEUTROPHILS # BLD AUTO: 3 K/UL (ref 1.8–7.7)
NEUTROPHILS NFR BLD: 63.4 % (ref 38–73)
NRBC BLD-RTO: 0 /100 WBC
PLATELET # BLD AUTO: 101 K/UL (ref 150–350)
PLATELET # BLD AUTO: 162 K/UL (ref 150–350)
PMV BLD AUTO: 8.6 FL (ref 9.2–12.9)
PMV BLD AUTO: 8.8 FL (ref 9.2–12.9)
POTASSIUM SERPL-SCNC: 4.3 MMOL/L (ref 3.5–5.1)
PROTHROMBIN TIME: 11 SEC (ref 9–12.5)
RBC # BLD AUTO: 4.73 M/UL (ref 4.6–6.2)
RBC # BLD AUTO: 5.33 M/UL (ref 4.6–6.2)
SODIUM SERPL-SCNC: 140 MMOL/L (ref 136–145)
WBC # BLD AUTO: 4.52 K/UL (ref 3.9–12.7)
WBC # BLD AUTO: 4.69 K/UL (ref 3.9–12.7)

## 2021-01-19 PROCEDURE — A4216 STERILE WATER/SALINE, 10 ML: HCPCS | Performed by: INTERNAL MEDICINE

## 2021-01-19 PROCEDURE — 20000000 HC ICU ROOM

## 2021-01-19 PROCEDURE — C1760 CLOSURE DEV, VASC: HCPCS | Performed by: INTERNAL MEDICINE

## 2021-01-19 PROCEDURE — 36556 CENTRAL LINE: ICD-10-PCS | Mod: 59,Q0,, | Performed by: ANESTHESIOLOGY

## 2021-01-19 PROCEDURE — 25500020 PHARM REV CODE 255: Performed by: INTERNAL MEDICINE

## 2021-01-19 PROCEDURE — 85025 COMPLETE CBC W/AUTO DIFF WBC: CPT

## 2021-01-19 PROCEDURE — 93005 ELECTROCARDIOGRAM TRACING: CPT

## 2021-01-19 PROCEDURE — 36556 INSERT NON-TUNNEL CV CATH: CPT | Mod: 59,Q0,, | Performed by: ANESTHESIOLOGY

## 2021-01-19 PROCEDURE — 99233 SBSQ HOSP IP/OBS HIGH 50: CPT | Mod: ,,, | Performed by: INTERNAL MEDICINE

## 2021-01-19 PROCEDURE — C1894 INTRO/SHEATH, NON-LASER: HCPCS | Performed by: INTERNAL MEDICINE

## 2021-01-19 PROCEDURE — 33361 REPLACE AORTIC VALVE PERQ: CPT | Mod: Q0 | Performed by: INTERNAL MEDICINE

## 2021-01-19 PROCEDURE — 33361 REPLACE AORTIC VALVE PERQ: CPT | Mod: 62,Q0,, | Performed by: THORACIC SURGERY (CARDIOTHORACIC VASCULAR SURGERY)

## 2021-01-19 PROCEDURE — 85610 PROTHROMBIN TIME: CPT

## 2021-01-19 PROCEDURE — 94761 N-INVAS EAR/PLS OXIMETRY MLT: CPT

## 2021-01-19 PROCEDURE — D9220A PRA ANESTHESIA: Mod: Q0,,, | Performed by: ANESTHESIOLOGY

## 2021-01-19 PROCEDURE — 93010 EKG 12-LEAD: ICD-10-PCS | Mod: ,,, | Performed by: INTERNAL MEDICINE

## 2021-01-19 PROCEDURE — 99499 UNLISTED E&M SERVICE: CPT | Mod: ,,, | Performed by: INTERNAL MEDICINE

## 2021-01-19 PROCEDURE — 25000003 PHARM REV CODE 250: Performed by: STUDENT IN AN ORGANIZED HEALTH CARE EDUCATION/TRAINING PROGRAM

## 2021-01-19 PROCEDURE — 33361 PR TAVR, PERCUTANEOUS FEMORAL: ICD-10-PCS | Mod: 62,Q0,, | Performed by: THORACIC SURGERY (CARDIOTHORACIC VASCULAR SURGERY)

## 2021-01-19 PROCEDURE — 99233 PR SUBSEQUENT HOSPITAL CARE,LEVL III: ICD-10-PCS | Mod: ,,, | Performed by: INTERNAL MEDICINE

## 2021-01-19 PROCEDURE — 93010 ELECTROCARDIOGRAM REPORT: CPT | Mod: ,,, | Performed by: INTERNAL MEDICINE

## 2021-01-19 PROCEDURE — 33361 PR TAVR, PERCUTANEOUS FEMORAL: ICD-10-PCS | Mod: 62,Q0,, | Performed by: INTERNAL MEDICINE

## 2021-01-19 PROCEDURE — 36620 ARTERIAL: ICD-10-PCS | Mod: 59,Q0,, | Performed by: ANESTHESIOLOGY

## 2021-01-19 PROCEDURE — 37000009 HC ANESTHESIA EA ADD 15 MINS: Performed by: ANESTHESIOLOGY

## 2021-01-19 PROCEDURE — D9220A PRA ANESTHESIA: ICD-10-PCS | Mod: Q0,,, | Performed by: ANESTHESIOLOGY

## 2021-01-19 PROCEDURE — 99499 NO LOS: ICD-10-PCS | Mod: ,,, | Performed by: INTERNAL MEDICINE

## 2021-01-19 PROCEDURE — 76937 CENTRAL LINE: ICD-10-PCS | Mod: 26,Q0,, | Performed by: ANESTHESIOLOGY

## 2021-01-19 PROCEDURE — 37000008 HC ANESTHESIA 1ST 15 MINUTES: Performed by: ANESTHESIOLOGY

## 2021-01-19 PROCEDURE — 76937 US GUIDE VASCULAR ACCESS: CPT | Mod: 26,Q0,, | Performed by: ANESTHESIOLOGY

## 2021-01-19 PROCEDURE — 85027 COMPLETE CBC AUTOMATED: CPT

## 2021-01-19 PROCEDURE — 63600175 PHARM REV CODE 636 W HCPCS: Performed by: STUDENT IN AN ORGANIZED HEALTH CARE EDUCATION/TRAINING PROGRAM

## 2021-01-19 PROCEDURE — C1769 GUIDE WIRE: HCPCS | Performed by: INTERNAL MEDICINE

## 2021-01-19 PROCEDURE — 33361 REPLACE AORTIC VALVE PERQ: CPT | Mod: 62,Q0,, | Performed by: INTERNAL MEDICINE

## 2021-01-19 PROCEDURE — 27201423 OPTIME MED/SURG SUP & DEVICES STERILE SUPPLY: Performed by: INTERNAL MEDICINE

## 2021-01-19 PROCEDURE — 86900 BLOOD TYPING SEROLOGIC ABO: CPT

## 2021-01-19 PROCEDURE — 25000003 PHARM REV CODE 250: Performed by: INTERNAL MEDICINE

## 2021-01-19 PROCEDURE — 85730 THROMBOPLASTIN TIME PARTIAL: CPT

## 2021-01-19 PROCEDURE — 27800903 OPTIME MED/SURG SUP & DEVICES OTHER IMPLANTS: Performed by: INTERNAL MEDICINE

## 2021-01-19 PROCEDURE — 36620 INSERTION CATHETER ARTERY: CPT | Mod: 59,Q0,, | Performed by: ANESTHESIOLOGY

## 2021-01-19 PROCEDURE — C1725 CATH, TRANSLUMIN NON-LASER: HCPCS | Performed by: INTERNAL MEDICINE

## 2021-01-19 PROCEDURE — 80048 BASIC METABOLIC PNL TOTAL CA: CPT

## 2021-01-19 DEVICE — VALVE EVOLUT PRO+ TAVR 29MM: Type: IMPLANTABLE DEVICE | Site: HEART | Status: FUNCTIONAL

## 2021-01-19 RX ORDER — HEPARIN SODIUM 1000 [USP'U]/ML
INJECTION, SOLUTION INTRAVENOUS; SUBCUTANEOUS
Status: DISCONTINUED | OUTPATIENT
Start: 2021-01-19 | End: 2021-01-21

## 2021-01-19 RX ORDER — ASCORBIC ACID 500 MG
1000 TABLET ORAL DAILY
Status: DISCONTINUED | OUTPATIENT
Start: 2021-01-19 | End: 2021-01-20 | Stop reason: HOSPADM

## 2021-01-19 RX ORDER — ACETAMINOPHEN 325 MG/1
650 TABLET ORAL EVERY 4 HOURS PRN
Status: DISCONTINUED | OUTPATIENT
Start: 2021-01-19 | End: 2021-01-20 | Stop reason: HOSPADM

## 2021-01-19 RX ORDER — QUETIAPINE FUMARATE 25 MG/1
100 TABLET, FILM COATED ORAL NIGHTLY
Status: DISCONTINUED | OUTPATIENT
Start: 2021-01-19 | End: 2021-01-20 | Stop reason: HOSPADM

## 2021-01-19 RX ORDER — SODIUM CHLORIDE 9 MG/ML
INJECTION, SOLUTION INTRAVENOUS CONTINUOUS
Status: ACTIVE | OUTPATIENT
Start: 2021-01-19 | End: 2021-01-19

## 2021-01-19 RX ORDER — LIDOCAINE HYDROCHLORIDE 20 MG/ML
INJECTION, SOLUTION INFILTRATION; PERINEURAL
Status: DISCONTINUED | OUTPATIENT
Start: 2021-01-19 | End: 2021-01-19 | Stop reason: HOSPADM

## 2021-01-19 RX ORDER — SODIUM CHLORIDE 9 MG/ML
INJECTION, SOLUTION INTRAVENOUS CONTINUOUS
Status: DISCONTINUED | OUTPATIENT
Start: 2021-01-19 | End: 2021-01-19

## 2021-01-19 RX ORDER — NOREPINEPHRINE BITARTRATE/D5W 4MG/250ML
0-3 PLASTIC BAG, INJECTION (ML) INTRAVENOUS CONTINUOUS
Status: DISCONTINUED | OUTPATIENT
Start: 2021-01-19 | End: 2021-01-20 | Stop reason: HOSPADM

## 2021-01-19 RX ORDER — PROTAMINE SULFATE 10 MG/ML
INJECTION, SOLUTION INTRAVENOUS
Status: DISCONTINUED | OUTPATIENT
Start: 2021-01-19 | End: 2021-01-21

## 2021-01-19 RX ORDER — HEPARIN SOD,PORCINE/0.9 % NACL 1000/500ML
INTRAVENOUS SOLUTION INTRAVENOUS
Status: DISCONTINUED | OUTPATIENT
Start: 2021-01-19 | End: 2021-01-19 | Stop reason: HOSPADM

## 2021-01-19 RX ORDER — CLOPIDOGREL BISULFATE 75 MG/1
75 TABLET ORAL DAILY
Status: DISCONTINUED | OUTPATIENT
Start: 2021-01-19 | End: 2021-01-20 | Stop reason: HOSPADM

## 2021-01-19 RX ORDER — ATORVASTATIN CALCIUM 20 MG/1
40 TABLET, FILM COATED ORAL NIGHTLY
Status: DISCONTINUED | OUTPATIENT
Start: 2021-01-19 | End: 2021-01-20 | Stop reason: HOSPADM

## 2021-01-19 RX ORDER — NAPROXEN SODIUM 220 MG/1
81 TABLET, FILM COATED ORAL DAILY
Status: DISCONTINUED | OUTPATIENT
Start: 2021-01-19 | End: 2021-01-20 | Stop reason: HOSPADM

## 2021-01-19 RX ORDER — SODIUM CHLORIDE 9 MG/ML
INJECTION, SOLUTION INTRAMUSCULAR; INTRAVENOUS; SUBCUTANEOUS
Status: DISCONTINUED | OUTPATIENT
Start: 2021-01-19 | End: 2021-01-19 | Stop reason: HOSPADM

## 2021-01-19 RX ORDER — DIPHENHYDRAMINE HCL 50 MG
50 CAPSULE ORAL ONCE
Status: COMPLETED | OUTPATIENT
Start: 2021-01-19 | End: 2021-01-19

## 2021-01-19 RX ORDER — QUETIAPINE FUMARATE 25 MG/1
50 TABLET, FILM COATED ORAL 2 TIMES DAILY
Status: DISCONTINUED | OUTPATIENT
Start: 2021-01-19 | End: 2021-01-19

## 2021-01-19 RX ORDER — DIAZEPAM 5 MG/1
10 TABLET ORAL DAILY
Status: DISCONTINUED | OUTPATIENT
Start: 2021-01-19 | End: 2021-01-20 | Stop reason: HOSPADM

## 2021-01-19 RX ADMIN — ATORVASTATIN CALCIUM 40 MG: 20 TABLET, FILM COATED ORAL at 09:01

## 2021-01-19 RX ADMIN — DIAZEPAM 10 MG: 5 TABLET ORAL at 02:01

## 2021-01-19 RX ADMIN — HEPARIN SODIUM 15000 UNITS: 1000 INJECTION INTRAVENOUS; SUBCUTANEOUS at 12:01

## 2021-01-19 RX ADMIN — QUETIAPINE FUMARATE 100 MG: 25 TABLET ORAL at 09:01

## 2021-01-19 RX ADMIN — OXYCODONE HYDROCHLORIDE AND ACETAMINOPHEN 1000 MG: 500 TABLET ORAL at 03:01

## 2021-01-19 RX ADMIN — DIPHENHYDRAMINE HYDROCHLORIDE 50 MG: 50 CAPSULE ORAL at 08:01

## 2021-01-19 RX ADMIN — SODIUM CHLORIDE 125 ML/HR: 0.9 INJECTION, SOLUTION INTRAVENOUS at 08:01

## 2021-01-19 RX ADMIN — PROTAMINE SULFATE 100 MG: 10 INJECTION, SOLUTION INTRAVENOUS at 12:01

## 2021-01-20 VITALS
WEIGHT: 293 LBS | HEIGHT: 69 IN | OXYGEN SATURATION: 97 % | BODY MASS INDEX: 43.4 KG/M2 | RESPIRATION RATE: 23 BRPM | TEMPERATURE: 98 F | DIASTOLIC BLOOD PRESSURE: 68 MMHG | SYSTOLIC BLOOD PRESSURE: 153 MMHG | HEART RATE: 70 BPM

## 2021-01-20 PROBLEM — L03.90 CELLULITIS: Status: ACTIVE | Noted: 2021-01-20

## 2021-01-20 PROBLEM — I25.10 CORONARY ARTERY DISEASE INVOLVING NATIVE CORONARY ARTERY OF NATIVE HEART WITHOUT ANGINA PECTORIS: Status: ACTIVE | Noted: 2021-01-20

## 2021-01-20 PROBLEM — I50.32 CHRONIC DIASTOLIC HEART FAILURE: Status: ACTIVE | Noted: 2021-01-20

## 2021-01-20 PROBLEM — Z95.2 S/P TAVR (TRANSCATHETER AORTIC VALVE REPLACEMENT): Status: ACTIVE | Noted: 2021-01-20

## 2021-01-20 LAB
ANION GAP SERPL CALC-SCNC: 10 MMOL/L (ref 8–16)
BASOPHILS # BLD AUTO: 0.03 K/UL (ref 0–0.2)
BASOPHILS NFR BLD: 0.6 % (ref 0–1.9)
BUN SERPL-MCNC: 11 MG/DL (ref 8–23)
CALCIUM SERPL-MCNC: 8.5 MG/DL (ref 8.7–10.5)
CHLORIDE SERPL-SCNC: 107 MMOL/L (ref 95–110)
CO2 SERPL-SCNC: 23 MMOL/L (ref 23–29)
CREAT SERPL-MCNC: 0.8 MG/DL (ref 0.5–1.4)
DIFFERENTIAL METHOD: ABNORMAL
EOSINOPHIL # BLD AUTO: 0.2 K/UL (ref 0–0.5)
EOSINOPHIL NFR BLD: 3.2 % (ref 0–8)
ERYTHROCYTE [DISTWIDTH] IN BLOOD BY AUTOMATED COUNT: 16.1 % (ref 11.5–14.5)
EST. GFR  (AFRICAN AMERICAN): >60 ML/MIN/1.73 M^2
EST. GFR  (NON AFRICAN AMERICAN): >60 ML/MIN/1.73 M^2
GLUCOSE SERPL-MCNC: 124 MG/DL (ref 70–110)
HCT VFR BLD AUTO: 43 % (ref 40–54)
HGB BLD-MCNC: 13.3 G/DL (ref 14–18)
IMM GRANULOCYTES # BLD AUTO: 0.02 K/UL (ref 0–0.04)
IMM GRANULOCYTES NFR BLD AUTO: 0.4 % (ref 0–0.5)
LYMPHOCYTES # BLD AUTO: 1 K/UL (ref 1–4.8)
LYMPHOCYTES NFR BLD: 18.9 % (ref 18–48)
MCH RBC QN AUTO: 28 PG (ref 27–31)
MCHC RBC AUTO-ENTMCNC: 30.9 G/DL (ref 32–36)
MCV RBC AUTO: 91 FL (ref 82–98)
MONOCYTES # BLD AUTO: 0.5 K/UL (ref 0.3–1)
MONOCYTES NFR BLD: 9 % (ref 4–15)
NEUTROPHILS # BLD AUTO: 3.6 K/UL (ref 1.8–7.7)
NEUTROPHILS NFR BLD: 67.9 % (ref 38–73)
NRBC BLD-RTO: 0 /100 WBC
PLATELET # BLD AUTO: 102 K/UL (ref 150–350)
PMV BLD AUTO: 9.1 FL (ref 9.2–12.9)
POC ACTIVATED CLOTTING TIME K: 131 SEC (ref 74–137)
POC ACTIVATED CLOTTING TIME K: 131 SEC (ref 74–137)
POC ACTIVATED CLOTTING TIME K: 340 SEC (ref 74–137)
POTASSIUM SERPL-SCNC: 4 MMOL/L (ref 3.5–5.1)
RBC # BLD AUTO: 4.75 M/UL (ref 4.6–6.2)
SAMPLE: ABNORMAL
SAMPLE: NORMAL
SAMPLE: NORMAL
SODIUM SERPL-SCNC: 140 MMOL/L (ref 136–145)
WBC # BLD AUTO: 5.34 K/UL (ref 3.9–12.7)

## 2021-01-20 PROCEDURE — 99239 PR HOSPITAL DISCHARGE DAY,>30 MIN: ICD-10-PCS | Mod: ,,, | Performed by: PHYSICIAN ASSISTANT

## 2021-01-20 PROCEDURE — 99233 PR SUBSEQUENT HOSPITAL CARE,LEVL III: ICD-10-PCS | Mod: ,,, | Performed by: INTERNAL MEDICINE

## 2021-01-20 PROCEDURE — 93010 ELECTROCARDIOGRAM REPORT: CPT | Mod: ,,, | Performed by: INTERNAL MEDICINE

## 2021-01-20 PROCEDURE — 99239 HOSP IP/OBS DSCHRG MGMT >30: CPT | Mod: ,,, | Performed by: PHYSICIAN ASSISTANT

## 2021-01-20 PROCEDURE — 25000003 PHARM REV CODE 250: Performed by: STUDENT IN AN ORGANIZED HEALTH CARE EDUCATION/TRAINING PROGRAM

## 2021-01-20 PROCEDURE — 94761 N-INVAS EAR/PLS OXIMETRY MLT: CPT

## 2021-01-20 PROCEDURE — 93010 EKG 12-LEAD: ICD-10-PCS | Mod: ,,, | Performed by: INTERNAL MEDICINE

## 2021-01-20 PROCEDURE — 80048 BASIC METABOLIC PNL TOTAL CA: CPT

## 2021-01-20 PROCEDURE — 85025 COMPLETE CBC W/AUTO DIFF WBC: CPT

## 2021-01-20 PROCEDURE — 99233 SBSQ HOSP IP/OBS HIGH 50: CPT | Mod: ,,, | Performed by: INTERNAL MEDICINE

## 2021-01-20 PROCEDURE — 25000003 PHARM REV CODE 250: Performed by: PHYSICIAN ASSISTANT

## 2021-01-20 PROCEDURE — 93005 ELECTROCARDIOGRAM TRACING: CPT

## 2021-01-20 RX ORDER — CLOPIDOGREL BISULFATE 75 MG/1
75 TABLET ORAL DAILY
Qty: 30 TABLET | Refills: 5 | Status: SHIPPED | OUTPATIENT
Start: 2021-01-20 | End: 2021-07-19

## 2021-01-20 RX ORDER — FUROSEMIDE 20 MG/1
TABLET ORAL
Qty: 10 TABLET | Refills: 0 | Status: SHIPPED | OUTPATIENT
Start: 2021-01-20

## 2021-01-20 RX ORDER — DOXYCYCLINE HYCLATE 100 MG
100 TABLET ORAL EVERY 12 HOURS
Status: DISCONTINUED | OUTPATIENT
Start: 2021-01-20 | End: 2021-01-20 | Stop reason: HOSPADM

## 2021-01-20 RX ORDER — DOXYCYCLINE HYCLATE 100 MG
100 TABLET ORAL EVERY 12 HOURS
Qty: 28 TABLET | Refills: 0 | Status: SHIPPED | OUTPATIENT
Start: 2021-01-20 | End: 2021-02-03

## 2021-01-20 RX ADMIN — CLOPIDOGREL 75 MG: 75 TABLET, FILM COATED ORAL at 09:01

## 2021-01-20 RX ADMIN — ASPIRIN 81 MG: 81 TABLET, CHEWABLE ORAL at 09:01

## 2021-01-20 RX ADMIN — OXYCODONE HYDROCHLORIDE AND ACETAMINOPHEN 1000 MG: 500 TABLET ORAL at 09:01

## 2021-01-20 RX ADMIN — DOXYCYCLINE HYCLATE 100 MG: 100 TABLET, COATED ORAL at 09:01

## 2021-02-19 ENCOUNTER — TELEPHONE (OUTPATIENT)
Dept: HEMATOLOGY/ONCOLOGY | Facility: CLINIC | Age: 66
End: 2021-02-19

## 2021-02-25 ENCOUNTER — HOSPITAL ENCOUNTER (OUTPATIENT)
Dept: CARDIOLOGY | Facility: HOSPITAL | Age: 66
Discharge: HOME | End: 2021-02-25
Attending: INTERNAL MEDICINE
Payer: MEDICARE

## 2021-02-25 ENCOUNTER — OFFICE VISIT (OUTPATIENT)
Dept: CARDIOLOGY | Facility: CLINIC | Age: 66
End: 2021-02-25
Payer: MEDICARE

## 2021-02-25 VITALS
OXYGEN SATURATION: 96 % | BODY MASS INDEX: 41.77 KG/M2 | DIASTOLIC BLOOD PRESSURE: 70 MMHG | HEART RATE: 65 BPM | WEIGHT: 282 LBS | SYSTOLIC BLOOD PRESSURE: 138 MMHG | WEIGHT: 281.94 LBS | HEIGHT: 69 IN | HEIGHT: 69 IN | SYSTOLIC BLOOD PRESSURE: 133 MMHG | BODY MASS INDEX: 41.76 KG/M2 | DIASTOLIC BLOOD PRESSURE: 82 MMHG | HEART RATE: 68 BPM

## 2021-02-25 DIAGNOSIS — Z95.2 S/P TAVR (TRANSCATHETER AORTIC VALVE REPLACEMENT): ICD-10-CM

## 2021-02-25 DIAGNOSIS — I10 HYPERTENSION, UNSPECIFIED TYPE: ICD-10-CM

## 2021-02-25 DIAGNOSIS — I50.32 CHRONIC DIASTOLIC HEART FAILURE: ICD-10-CM

## 2021-02-25 DIAGNOSIS — I25.10 CORONARY ARTERY DISEASE INVOLVING NATIVE CORONARY ARTERY OF NATIVE HEART WITHOUT ANGINA PECTORIS: ICD-10-CM

## 2021-02-25 DIAGNOSIS — I35.0 NONRHEUMATIC AORTIC VALVE STENOSIS: ICD-10-CM

## 2021-02-25 DIAGNOSIS — E66.01 MORBID OBESITY: ICD-10-CM

## 2021-02-25 DIAGNOSIS — E78.00 HYPERCHOLESTEROLEMIA: ICD-10-CM

## 2021-02-25 DIAGNOSIS — D69.6 THROMBOCYTOPENIA: ICD-10-CM

## 2021-02-25 DIAGNOSIS — Z95.2 S/P TAVR (TRANSCATHETER AORTIC VALVE REPLACEMENT): Primary | ICD-10-CM

## 2021-02-25 LAB
ASCENDING AORTA: 3.25 CM
AV INDEX (PROSTH): 0.59
AV MEAN GRADIENT: 10 MMHG
AV PEAK GRADIENT: 18 MMHG
AV VALVE AREA: 3.2 CM2
AV VELOCITY RATIO: 0.58
BSA FOR ECHO PROCEDURE: 2.5 M2
CV ECHO LV RWT: 0.44 CM
DOP CALC AO PEAK VEL: 2.1 M/S
DOP CALC AO VTI: 44.23 CM
DOP CALC LVOT AREA: 5.4 CM2
DOP CALC LVOT DIAMETER: 2.63 CM
DOP CALC LVOT PEAK VEL: 1.21 M/S
DOP CALC LVOT STROKE VOLUME: 141.34 CM3
DOP CALCLVOT PEAK VEL VTI: 26.03 CM
E WAVE DECELERATION TIME: 186.74 MSEC
E/A RATIO: 1.04
E/E' RATIO: 13.27 M/S
ECHO LV POSTERIOR WALL: 1.1 CM (ref 0.6–1.1)
FRACTIONAL SHORTENING: 33 % (ref 28–44)
INTERVENTRICULAR SEPTUM: 1.22 CM (ref 0.6–1.1)
LA MAJOR: 6.12 CM
LA MINOR: 6.03 CM
LA WIDTH: 3.87 CM
LEFT ATRIUM SIZE: 4.87 CM
LEFT ATRIUM VOLUME INDEX MOD: 30 ML/M2
LEFT ATRIUM VOLUME INDEX: 40.7 ML/M2
LEFT ATRIUM VOLUME MOD: 71.72 CM3
LEFT ATRIUM VOLUME: 97.32 CM3
LEFT INTERNAL DIMENSION IN SYSTOLE: 3.34 CM (ref 2.1–4)
LEFT VENTRICLE DIASTOLIC VOLUME INDEX: 48.79 ML/M2
LEFT VENTRICLE DIASTOLIC VOLUME: 116.6 ML
LEFT VENTRICLE MASS INDEX: 92 G/M2
LEFT VENTRICLE SYSTOLIC VOLUME INDEX: 19 ML/M2
LEFT VENTRICLE SYSTOLIC VOLUME: 45.34 ML
LEFT VENTRICULAR INTERNAL DIMENSION IN DIASTOLE: 4.97 CM (ref 3.5–6)
LEFT VENTRICULAR MASS: 220.79 G
LV LATERAL E/E' RATIO: 12.17 M/S
LV SEPTAL E/E' RATIO: 14.6 M/S
MV A" WAVE DURATION": 13.7 MSEC
MV PEAK A VEL: 0.7 M/S
MV PEAK E VEL: 0.73 M/S
MV STENOSIS PRESSURE HALF TIME: 54.15 MS
MV VALVE AREA P 1/2 METHOD: 4.06 CM2
PISA TR MAX VEL: 2.07 M/S
PULM VEIN S/D RATIO: 1.35
PV PEAK D VEL: 0.4 M/S
PV PEAK S VEL: 0.54 M/S
RA MAJOR: 5.16 CM
RA PRESSURE: 3 MMHG
RA WIDTH: 2.91 CM
RIGHT VENTRICULAR END-DIASTOLIC DIMENSION: 2.91 CM
SINUS: 3.05 CM
STJ: 3.13 CM
TDI LATERAL: 0.06 M/S
TDI SEPTAL: 0.05 M/S
TDI: 0.06 M/S
TR MAX PG: 17 MMHG
TRICUSPID ANNULAR PLANE SYSTOLIC EXCURSION: 1.66 CM
TV REST PULMONARY ARTERY PRESSURE: 20 MMHG

## 2021-02-25 PROCEDURE — 99999 PR PBB SHADOW E&M-EST. PATIENT-LVL III: CPT | Mod: PBBFAC,,,

## 2021-02-25 PROCEDURE — 3008F PR BODY MASS INDEX (BMI) DOCUMENTED: ICD-10-PCS | Mod: CPTII,S$GLB,, | Performed by: INTERNAL MEDICINE

## 2021-02-25 PROCEDURE — 1126F PR PAIN SEVERITY QUANTIFIED, NO PAIN PRESENT: ICD-10-PCS | Mod: S$GLB,,, | Performed by: INTERNAL MEDICINE

## 2021-02-25 PROCEDURE — 93306 TTE W/DOPPLER COMPLETE: CPT | Mod: 26,,, | Performed by: INTERNAL MEDICINE

## 2021-02-25 PROCEDURE — 99214 OFFICE O/P EST MOD 30 MIN: CPT | Mod: S$GLB,,, | Performed by: INTERNAL MEDICINE

## 2021-02-25 PROCEDURE — 3075F SYST BP GE 130 - 139MM HG: CPT | Mod: CPTII,S$GLB,, | Performed by: INTERNAL MEDICINE

## 2021-02-25 PROCEDURE — 3078F PR MOST RECENT DIASTOLIC BLOOD PRESSURE < 80 MM HG: ICD-10-PCS | Mod: CPTII,S$GLB,, | Performed by: INTERNAL MEDICINE

## 2021-02-25 PROCEDURE — 3008F BODY MASS INDEX DOCD: CPT | Mod: CPTII,S$GLB,, | Performed by: INTERNAL MEDICINE

## 2021-02-25 PROCEDURE — 99999 PR PBB SHADOW E&M-EST. PATIENT-LVL III: ICD-10-PCS | Mod: PBBFAC,,,

## 2021-02-25 PROCEDURE — 1126F AMNT PAIN NOTED NONE PRSNT: CPT | Mod: S$GLB,,, | Performed by: INTERNAL MEDICINE

## 2021-02-25 PROCEDURE — 1159F MED LIST DOCD IN RCRD: CPT | Mod: S$GLB,,, | Performed by: INTERNAL MEDICINE

## 2021-02-25 PROCEDURE — 1159F PR MEDICATION LIST DOCUMENTED IN MEDICAL RECORD: ICD-10-PCS | Mod: S$GLB,,, | Performed by: INTERNAL MEDICINE

## 2021-02-25 PROCEDURE — 93306 TTE W/DOPPLER COMPLETE: CPT

## 2021-02-25 PROCEDURE — 3078F DIAST BP <80 MM HG: CPT | Mod: CPTII,S$GLB,, | Performed by: INTERNAL MEDICINE

## 2021-02-25 PROCEDURE — 99214 PR OFFICE/OUTPT VISIT, EST, LEVL IV, 30-39 MIN: ICD-10-PCS | Mod: S$GLB,,, | Performed by: INTERNAL MEDICINE

## 2021-02-25 PROCEDURE — 3075F PR MOST RECENT SYSTOLIC BLOOD PRESS GE 130-139MM HG: ICD-10-PCS | Mod: CPTII,S$GLB,, | Performed by: INTERNAL MEDICINE

## 2021-02-25 PROCEDURE — 93306 ECHO (CUPID ONLY): ICD-10-PCS | Mod: 26,,, | Performed by: INTERNAL MEDICINE

## 2021-04-14 ENCOUNTER — HOSPITAL ENCOUNTER (OUTPATIENT)
Dept: RADIOLOGY | Facility: OTHER | Age: 66
Discharge: HOME | End: 2021-04-14
Attending: INTERNAL MEDICINE
Payer: MEDICARE

## 2021-04-14 DIAGNOSIS — R91.1 PULMONARY NODULE: ICD-10-CM

## 2021-04-14 PROCEDURE — 71250 CT THORAX DX C-: CPT | Mod: 26,,, | Performed by: RADIOLOGY

## 2021-04-14 PROCEDURE — 71250 CT THORAX DX C-: CPT | Mod: TC

## 2021-04-14 PROCEDURE — 71250 CT CHEST WITHOUT CONTRAST: ICD-10-PCS | Mod: 26,,, | Performed by: RADIOLOGY

## 2021-04-15 ENCOUNTER — TELEPHONE (OUTPATIENT)
Dept: PULMONOLOGY | Facility: CLINIC | Age: 66
End: 2021-04-15

## 2021-04-21 ENCOUNTER — OFFICE VISIT (OUTPATIENT)
Dept: PULMONOLOGY | Facility: CLINIC | Age: 66
End: 2021-04-21
Payer: MEDICARE

## 2021-04-21 VITALS
HEIGHT: 69 IN | DIASTOLIC BLOOD PRESSURE: 64 MMHG | OXYGEN SATURATION: 94 % | SYSTOLIC BLOOD PRESSURE: 122 MMHG | RESPIRATION RATE: 16 BRPM | HEART RATE: 67 BPM | WEIGHT: 279.13 LBS | BODY MASS INDEX: 41.34 KG/M2

## 2021-04-21 DIAGNOSIS — R91.1 SOLITARY PULMONARY NODULE: ICD-10-CM

## 2021-04-21 PROCEDURE — 99204 PR OFFICE/OUTPT VISIT, NEW, LEVL IV, 45-59 MIN: ICD-10-PCS | Mod: S$GLB,,, | Performed by: INTERNAL MEDICINE

## 2021-04-21 PROCEDURE — 1126F AMNT PAIN NOTED NONE PRSNT: CPT | Mod: S$GLB,,, | Performed by: INTERNAL MEDICINE

## 2021-04-21 PROCEDURE — 1159F PR MEDICATION LIST DOCUMENTED IN MEDICAL RECORD: ICD-10-PCS | Mod: S$GLB,,, | Performed by: INTERNAL MEDICINE

## 2021-04-21 PROCEDURE — 3008F PR BODY MASS INDEX (BMI) DOCUMENTED: ICD-10-PCS | Mod: CPTII,S$GLB,, | Performed by: INTERNAL MEDICINE

## 2021-04-21 PROCEDURE — 99999 PR PBB SHADOW E&M-EST. PATIENT-LVL III: CPT | Mod: PBBFAC,,, | Performed by: INTERNAL MEDICINE

## 2021-04-21 PROCEDURE — 1159F MED LIST DOCD IN RCRD: CPT | Mod: S$GLB,,, | Performed by: INTERNAL MEDICINE

## 2021-04-21 PROCEDURE — 3008F BODY MASS INDEX DOCD: CPT | Mod: CPTII,S$GLB,, | Performed by: INTERNAL MEDICINE

## 2021-04-21 PROCEDURE — 1126F PR PAIN SEVERITY QUANTIFIED, NO PAIN PRESENT: ICD-10-PCS | Mod: S$GLB,,, | Performed by: INTERNAL MEDICINE

## 2021-04-21 PROCEDURE — 99999 PR PBB SHADOW E&M-EST. PATIENT-LVL III: ICD-10-PCS | Mod: PBBFAC,,, | Performed by: INTERNAL MEDICINE

## 2021-04-21 PROCEDURE — 99204 OFFICE O/P NEW MOD 45 MIN: CPT | Mod: S$GLB,,, | Performed by: INTERNAL MEDICINE

## 2021-04-22 ENCOUNTER — TELEPHONE (OUTPATIENT)
Dept: PULMONOLOGY | Facility: CLINIC | Age: 66
End: 2021-04-22

## 2021-04-28 ENCOUNTER — PATIENT MESSAGE (OUTPATIENT)
Dept: RESEARCH | Facility: HOSPITAL | Age: 66
End: 2021-04-28

## 2021-07-15 ENCOUNTER — HOSPITAL ENCOUNTER (OUTPATIENT)
Dept: RADIOLOGY | Facility: OTHER | Age: 66
Discharge: HOME | End: 2021-07-15
Attending: INTERNAL MEDICINE
Payer: MEDICARE

## 2021-07-15 DIAGNOSIS — R91.1 SOLITARY PULMONARY NODULE: ICD-10-CM

## 2021-07-15 PROCEDURE — 71250 CT CHEST WITHOUT CONTRAST: ICD-10-PCS | Mod: 26,,, | Performed by: RADIOLOGY

## 2021-07-15 PROCEDURE — 71250 CT THORAX DX C-: CPT | Mod: 26,,, | Performed by: RADIOLOGY

## 2021-07-15 PROCEDURE — 71250 CT THORAX DX C-: CPT | Mod: TC

## 2021-07-16 ENCOUNTER — TELEPHONE (OUTPATIENT)
Dept: PULMONOLOGY | Facility: CLINIC | Age: 66
End: 2021-07-16

## 2021-07-22 ENCOUNTER — TELEPHONE (OUTPATIENT)
Dept: PULMONOLOGY | Facility: CLINIC | Age: 66
End: 2021-07-22

## 2022-01-10 ENCOUNTER — PATIENT MESSAGE (OUTPATIENT)
Dept: PULMONOLOGY | Facility: CLINIC | Age: 67
End: 2022-01-10
Payer: MEDICARE

## 2022-01-10 DIAGNOSIS — R91.1 SOLITARY PULMONARY NODULE: ICD-10-CM

## 2022-01-10 DIAGNOSIS — R91.8 PULMONARY NODULES: Primary | ICD-10-CM

## 2022-01-11 ENCOUNTER — PATIENT MESSAGE (OUTPATIENT)
Dept: PULMONOLOGY | Facility: CLINIC | Age: 67
End: 2022-01-11
Payer: MEDICARE

## 2022-01-19 ENCOUNTER — HOSPITAL ENCOUNTER (OUTPATIENT)
Dept: RADIOLOGY | Facility: OTHER | Age: 67
Discharge: HOME | End: 2022-01-19
Attending: INTERNAL MEDICINE
Payer: MEDICARE

## 2022-01-19 DIAGNOSIS — J18.9 PNEUMONIA IN INFECTIOUS DISEASE: Primary | ICD-10-CM

## 2022-01-19 DIAGNOSIS — J18.9 PNEUMONIA IN INFECTIOUS DISEASE: ICD-10-CM

## 2022-01-19 DIAGNOSIS — R91.1 SOLITARY PULMONARY NODULE: ICD-10-CM

## 2022-01-19 DIAGNOSIS — R91.8 PULMONARY NODULES: ICD-10-CM

## 2022-01-19 PROCEDURE — 71046 X-RAY EXAM CHEST 2 VIEWS: CPT | Mod: TC,FY

## 2022-01-19 PROCEDURE — 71250 CT CHEST WITHOUT CONTRAST: ICD-10-PCS | Mod: 26,,, | Performed by: INTERNAL MEDICINE

## 2022-01-19 PROCEDURE — 71250 CT THORAX DX C-: CPT | Mod: 26,,, | Performed by: INTERNAL MEDICINE

## 2022-01-19 PROCEDURE — 71250 CT THORAX DX C-: CPT | Mod: TC

## 2022-01-19 PROCEDURE — 71046 X-RAY EXAM CHEST 2 VIEWS: CPT | Mod: 26,,, | Performed by: INTERNAL MEDICINE

## 2022-01-19 PROCEDURE — 71046 XR CHEST PA AND LATERAL: ICD-10-PCS | Mod: 26,,, | Performed by: INTERNAL MEDICINE

## 2022-01-21 ENCOUNTER — TELEPHONE (OUTPATIENT)
Dept: PULMONOLOGY | Facility: CLINIC | Age: 67
End: 2022-01-21
Payer: MEDICARE

## 2022-01-21 NOTE — TELEPHONE ENCOUNTER
Mr Hernandez called for CT results. Due to transportation problems Dr Gavin has patient get the CT at [facility] and calls him with results rather then coming to [facility] campus for a clinic visit. I will call Mr Hernandez and let him know Dr Gavin will not be here till Tuesday 1-25-22 to give the results of his CT scan. Alberta Austin LPN

## 2022-01-24 ENCOUNTER — TELEPHONE (OUTPATIENT)
Dept: RESEARCH | Facility: HOSPITAL | Age: 67
End: 2022-01-24
Payer: MEDICARE

## 2022-01-24 NOTE — TELEPHONE ENCOUNTER
Study title: Detection of Reduced Left Ventricular Ejection Fraction and Atrial Arrhymias with Single Lead ECG using Artifical Intelligence  IRB #: [id number]  IRB approval date: 6/30/2021  Sponsor: [employer]  Site Number: [facility]  Subject ID: N/A  Date of Encounter: 01/24/2022    Called patient to discuss participation into the [employer] study. Explained overview of study. Patient was not interested in participating. Thanked patient for their time.

## 2022-01-25 ENCOUNTER — OFFICE VISIT (OUTPATIENT)
Dept: CARDIOLOGY | Facility: CLINIC | Age: 67
End: 2022-01-25
Payer: MEDICARE

## 2022-01-25 ENCOUNTER — HOSPITAL ENCOUNTER (OUTPATIENT)
Dept: CARDIOLOGY | Facility: HOSPITAL | Age: 67
Discharge: HOME | End: 2022-01-25
Attending: PHYSICIAN ASSISTANT
Payer: MEDICARE

## 2022-01-25 ENCOUNTER — OFFICE VISIT (OUTPATIENT)
Dept: PULMONOLOGY | Facility: CLINIC | Age: 67
End: 2022-01-25
Payer: MEDICARE

## 2022-01-25 VITALS
HEIGHT: 69 IN | DIASTOLIC BLOOD PRESSURE: 68 MMHG | HEIGHT: 69 IN | WEIGHT: 279 LBS | SYSTOLIC BLOOD PRESSURE: 130 MMHG | BODY MASS INDEX: 41.32 KG/M2 | OXYGEN SATURATION: 98 % | BODY MASS INDEX: 37.68 KG/M2 | DIASTOLIC BLOOD PRESSURE: 70 MMHG | HEART RATE: 75 BPM | SYSTOLIC BLOOD PRESSURE: 142 MMHG | HEART RATE: 60 BPM | WEIGHT: 254.44 LBS

## 2022-01-25 DIAGNOSIS — Z95.2 S/P TAVR (TRANSCATHETER AORTIC VALVE REPLACEMENT): ICD-10-CM

## 2022-01-25 DIAGNOSIS — I25.119 CORONARY ARTERY DISEASE INVOLVING NATIVE CORONARY ARTERY OF NATIVE HEART WITH ANGINA PECTORIS: ICD-10-CM

## 2022-01-25 DIAGNOSIS — D69.6 THROMBOCYTOPENIA: ICD-10-CM

## 2022-01-25 DIAGNOSIS — R91.1 SOLITARY PULMONARY NODULE: Primary | ICD-10-CM

## 2022-01-25 DIAGNOSIS — I50.32 CHRONIC DIASTOLIC HEART FAILURE: ICD-10-CM

## 2022-01-25 DIAGNOSIS — E66.01 MORBID OBESITY: ICD-10-CM

## 2022-01-25 DIAGNOSIS — I35.0 NONRHEUMATIC AORTIC VALVE STENOSIS: ICD-10-CM

## 2022-01-25 LAB
ASCENDING AORTA: 3.52 CM
AV INDEX (PROSTH): 0.91
AV MEAN GRADIENT: 4 MMHG
AV PEAK GRADIENT: 7 MMHG
AV VALVE AREA: 4 CM2
AV VELOCITY RATIO: 0.85
BSA FOR ECHO PROCEDURE: 2.48 M2
CV ECHO LV RWT: 0.38 CM
DOP CALC AO PEAK VEL: 1.36 M/S
DOP CALC AO VTI: 29.66 CM
DOP CALC LVOT AREA: 4.4 CM2
DOP CALC LVOT DIAMETER: 2.37 CM
DOP CALC LVOT PEAK VEL: 1.15 M/S
DOP CALC LVOT STROKE VOLUME: 118.61 CM3
DOP CALCLVOT PEAK VEL VTI: 26.9 CM
E WAVE DECELERATION TIME: 257.81 MSEC
E/A RATIO: 0.75
E/E' RATIO: 8.83 M/S
ECHO LV POSTERIOR WALL: 1.06 CM (ref 0.6–1.1)
EJECTION FRACTION: 65 %
FRACTIONAL SHORTENING: 34 % (ref 28–44)
INTERVENTRICULAR SEPTUM: 0.99 CM (ref 0.6–1.1)
IVRT: 98.95 MSEC
LA MAJOR: 6.45 CM
LA MINOR: 6.41 CM
LA WIDTH: 4.9 CM
LEFT ATRIUM SIZE: 4.21 CM
LEFT ATRIUM VOLUME INDEX MOD: 53.7 ML/M2
LEFT ATRIUM VOLUME INDEX: 47.4 ML/M2
LEFT ATRIUM VOLUME MOD: 127.76 CM3
LEFT ATRIUM VOLUME: 112.75 CM3
LEFT INTERNAL DIMENSION IN SYSTOLE: 3.72 CM (ref 2.1–4)
LEFT VENTRICLE DIASTOLIC VOLUME INDEX: 65.01 ML/M2
LEFT VENTRICLE DIASTOLIC VOLUME: 154.72 ML
LEFT VENTRICLE MASS INDEX: 96 G/M2
LEFT VENTRICLE SYSTOLIC VOLUME INDEX: 24.7 ML/M2
LEFT VENTRICLE SYSTOLIC VOLUME: 58.84 ML
LEFT VENTRICULAR INTERNAL DIMENSION IN DIASTOLE: 5.62 CM (ref 3.5–6)
LEFT VENTRICULAR MASS: 228.33 G
LV LATERAL E/E' RATIO: 7.57 M/S
LV SEPTAL E/E' RATIO: 10.6 M/S
MV A" WAVE DURATION": 24.36 MSEC
MV PEAK A VEL: 0.71 M/S
MV PEAK E VEL: 0.53 M/S
MV STENOSIS PRESSURE HALF TIME: 74.77 MS
MV VALVE AREA P 1/2 METHOD: 2.94 CM2
PISA TR MAX VEL: 2.7 M/S
PULM VEIN S/D RATIO: 1.44
PV PEAK D VEL: 0.41 M/S
PV PEAK S VEL: 0.59 M/S
RA MAJOR: 4.97 CM
RA PRESSURE: 3 MMHG
RA WIDTH: 3.77 CM
RIGHT VENTRICULAR END-DIASTOLIC DIMENSION: 3.66 CM
RV TISSUE DOPPLER FREE WALL SYSTOLIC VELOCITY 1 (APICAL 4 CHAMBER VIEW): 14.15 CM/S
SINUS: 3.59 CM
STJ: 3.25 CM
TDI LATERAL: 0.07 M/S
TDI SEPTAL: 0.05 M/S
TDI: 0.06 M/S
TR MAX PG: 29 MMHG
TRICUSPID ANNULAR PLANE SYSTOLIC EXCURSION: 1.87 CM
TV REST PULMONARY ARTERY PRESSURE: 32 MMHG

## 2022-01-25 PROCEDURE — 99214 PR OFFICE/OUTPT VISIT, EST, LEVL IV, 30-39 MIN: ICD-10-PCS | Mod: S$GLB,,, | Performed by: INTERNAL MEDICINE

## 2022-01-25 PROCEDURE — 99999 PR PBB SHADOW E&M-EST. PATIENT-LVL I: CPT | Mod: PBBFAC,,, | Performed by: INTERNAL MEDICINE

## 2022-01-25 PROCEDURE — 99214 OFFICE O/P EST MOD 30 MIN: CPT | Mod: S$GLB,,, | Performed by: PHYSICIAN ASSISTANT

## 2022-01-25 PROCEDURE — 1126F AMNT PAIN NOTED NONE PRSNT: CPT | Mod: CPTII,S$GLB,, | Performed by: PHYSICIAN ASSISTANT

## 2022-01-25 PROCEDURE — 3078F DIAST BP <80 MM HG: CPT | Mod: CPTII,S$GLB,, | Performed by: PHYSICIAN ASSISTANT

## 2022-01-25 PROCEDURE — 99214 OFFICE O/P EST MOD 30 MIN: CPT | Mod: S$GLB,,, | Performed by: INTERNAL MEDICINE

## 2022-01-25 PROCEDURE — 3077F SYST BP >= 140 MM HG: CPT | Mod: CPTII,S$GLB,, | Performed by: PHYSICIAN ASSISTANT

## 2022-01-25 PROCEDURE — 99999 PR PBB SHADOW E&M-EST. PATIENT-LVL III: ICD-10-PCS | Mod: PBBFAC,,, | Performed by: PHYSICIAN ASSISTANT

## 2022-01-25 PROCEDURE — 1159F MED LIST DOCD IN RCRD: CPT | Mod: CPTII,S$GLB,, | Performed by: PHYSICIAN ASSISTANT

## 2022-01-25 PROCEDURE — 1159F PR MEDICATION LIST DOCUMENTED IN MEDICAL RECORD: ICD-10-PCS | Mod: CPTII,S$GLB,, | Performed by: PHYSICIAN ASSISTANT

## 2022-01-25 PROCEDURE — 1101F PT FALLS ASSESS-DOCD LE1/YR: CPT | Mod: CPTII,S$GLB,, | Performed by: INTERNAL MEDICINE

## 2022-01-25 PROCEDURE — 93306 TTE W/DOPPLER COMPLETE: CPT

## 2022-01-25 PROCEDURE — 3288F PR FALLS RISK ASSESSMENT DOCUMENTED: ICD-10-PCS | Mod: CPTII,S$GLB,, | Performed by: INTERNAL MEDICINE

## 2022-01-25 PROCEDURE — 3078F PR MOST RECENT DIASTOLIC BLOOD PRESSURE < 80 MM HG: ICD-10-PCS | Mod: CPTII,S$GLB,, | Performed by: PHYSICIAN ASSISTANT

## 2022-01-25 PROCEDURE — 1126F PR PAIN SEVERITY QUANTIFIED, NO PAIN PRESENT: ICD-10-PCS | Mod: CPTII,S$GLB,, | Performed by: PHYSICIAN ASSISTANT

## 2022-01-25 PROCEDURE — 99999 PR PBB SHADOW E&M-EST. PATIENT-LVL I: ICD-10-PCS | Mod: PBBFAC,,, | Performed by: INTERNAL MEDICINE

## 2022-01-25 PROCEDURE — 99999 PR PBB SHADOW E&M-EST. PATIENT-LVL III: CPT | Mod: PBBFAC,,, | Performed by: PHYSICIAN ASSISTANT

## 2022-01-25 PROCEDURE — 3008F BODY MASS INDEX DOCD: CPT | Mod: CPTII,S$GLB,, | Performed by: PHYSICIAN ASSISTANT

## 2022-01-25 PROCEDURE — 3288F FALL RISK ASSESSMENT DOCD: CPT | Mod: CPTII,S$GLB,, | Performed by: INTERNAL MEDICINE

## 2022-01-25 PROCEDURE — 1101F PR PT FALLS ASSESS DOC 0-1 FALLS W/OUT INJ PAST YR: ICD-10-PCS | Mod: CPTII,S$GLB,, | Performed by: INTERNAL MEDICINE

## 2022-01-25 PROCEDURE — 3077F PR MOST RECENT SYSTOLIC BLOOD PRESSURE >= 140 MM HG: ICD-10-PCS | Mod: CPTII,S$GLB,, | Performed by: PHYSICIAN ASSISTANT

## 2022-01-25 PROCEDURE — 99214 PR OFFICE/OUTPT VISIT, EST, LEVL IV, 30-39 MIN: ICD-10-PCS | Mod: S$GLB,,, | Performed by: PHYSICIAN ASSISTANT

## 2022-01-25 PROCEDURE — 93306 TTE W/DOPPLER COMPLETE: CPT | Mod: 26,,, | Performed by: INTERNAL MEDICINE

## 2022-01-25 PROCEDURE — 3008F PR BODY MASS INDEX (BMI) DOCUMENTED: ICD-10-PCS | Mod: CPTII,S$GLB,, | Performed by: PHYSICIAN ASSISTANT

## 2022-01-25 PROCEDURE — 93306 ECHO (CUPID ONLY): ICD-10-PCS | Mod: 26,,, | Performed by: INTERNAL MEDICINE

## 2022-01-25 RX ORDER — QUINIDINE SULFATE 200 MG
1 TABLET ORAL DAILY
COMMUNITY

## 2022-01-25 NOTE — ASSESSMENT & PLAN NOTE
Stable, asymptomatic. [facility] (Date 10/23/2020 by Dr. Schulte): Non obstructive CAD. On ASA/Statin/BB.

## 2022-01-25 NOTE — PROGRESS NOTES
Subjective:    Patient ID:  Ervin Hernandez is a 66 y.o. male who presents for follow-up of TAVR.     Referring Physician: Dr. Ron HUFF  Ervin Hernandez is a 66 y.o. male who presents for 1 year TAVR follow up. He was admitted and underwent successful placement of a 29 mm Evolut TAVR via RTF access under MAC sedation on 1/19/21. Today he states he is still feeling great. He has recently recovered from pneumonia.  He is able to do everything that he wants to do without SOB. He denies CP, SOB, weight gain, TAVAREZ, and palpitations. He has been compliant with ASA. Discontinued Plavix after 6 months. He continues to follow closely with Dr. Schulte.     NYHA: I CCS: 0    Review of Systems   Constitutional: Negative for chills and fever.   HENT: Negative for sore throat.    Eyes: Negative for blurred vision.   Cardiovascular: Negative for chest pain, claudication, cyanosis, dyspnea on exertion, irregular heartbeat, leg swelling, near-syncope, orthopnea, palpitations, paroxysmal nocturnal dyspnea and syncope.   Respiratory: Negative for cough and sputum production.    Hematologic/Lymphatic: Does not bruise/bleed easily.   Skin: Negative for itching, rash and suspicious lesions.   Musculoskeletal: Negative for falls.   Gastrointestinal: Negative for abdominal pain and change in bowel habit.   Genitourinary: Negative for dysuria.   Neurological: Negative for disturbances in coordination, dizziness and loss of balance.   Psychiatric/Behavioral: Negative for altered mental status.          Past Medical History:   Diagnosis Date    Hypercholesteremia     Hypercholesterolemia 9/5/2019 1995: Began statin.    Hypertension     Hypertension 9/5/2019 1995: Diagnosed.    Kidney stones     Mitral valve prolapse 9/5/2019 1995: Diagnosed.    Morbid obesity 9/5/2019 9/5/2019: Weight 127 kg. BMI 41.    OCD (obsessive compulsive disorder)        Current Outpatient Medications:     ascorbic acid,  vitamin C, (VITAMIN C) 1000 MG tablet, Take 1,000 mg by mouth once daily., Disp: , Rfl:     aspirin 81 MG Chew, Take 81 mg by mouth once daily., Disp: , Rfl:     atenolol (TENORMIN) 50 MG tablet, Take 1 tablet (50 mg total) by mouth once daily., Disp: 90 tablet, Rfl: 3    atorvastatin (LIPITOR) 40 MG tablet, Take 40 mg by mouth every evening. , Disp: , Rfl:     co-enzyme Q-10 30 mg capsule, Take 100 mg by mouth once daily. , Disp: , Rfl:     diazePAM (VALIUM) 10 MG Tab, Take 10 mg by mouth once daily. , Disp: , Rfl:     folic acid/multivit-min/lutein (CENTRUM SILVER ORAL), Take by mouth., Disp: , Rfl:     hydroCHLOROthiazide (HYDRODIURIL) 25 MG tablet, Take 25 mg by mouth once daily., Disp: , Rfl:     QUEtiapine (SEROQUEL) 50 MG tablet, Take 50 mg by mouth 2 (two) times daily. , Disp: , Rfl:     telmisartan (MICARDIS) 80 MG Tab, Take 80 mg by mouth once daily. , Disp: , Rfl:     clopidogreL (PLAVIX) 75 mg tablet, Take 1 tablet (75 mg total) by mouth once daily., Disp: 30 tablet, Rfl: 5    furosemide (LASIX) 20 MG tablet, Take AS NEEDED, daily for 3 lb weight gain overnight or 5 lbs in 5 days. (Patient not taking: Reported on 1/25/2022), Disp: 10 tablet, Rfl: 0    Objective:    Physical Exam  Vitals reviewed.   Constitutional:       General: He is not in acute distress.     Appearance: He is well-developed. He is not diaphoretic.   HENT:      Head: Normocephalic and atraumatic.   Neck:      Vascular: No JVD.   Cardiovascular:      Rate and Rhythm: Normal rate and regular rhythm.      Pulses: Intact distal pulses.      Heart sounds: No murmur heard.      Pulmonary:      Effort: Pulmonary effort is normal. No respiratory distress.      Breath sounds: Normal breath sounds.   Abdominal:      General: There is no distension.      Palpations: Abdomen is soft.   Musculoskeletal:      Cervical back: Normal range of motion.   Skin:     General: Skin is warm and dry.   Neurological:      Mental Status: He is alert  "and oriented to person, place, and time.             Vitals:    01/25/22 1016   Weight: 115.4 kg (254 lb 6.6 oz)   Height: 5' 9" (1.753 m)     Body mass index is 37.57 kg/m².    Test(s) Reviewed  I have reviewed the following in detail:  [] Stress test   [] Angiography   [x] Echocardiogram   [x] Labs: H/H 14.8/45.9, Cr 1.0   [] Other     TTE Today  · The left ventricle is normal in size with normal systolic function.  · The estimated ejection fraction is 65%.  · Indeterminate left ventricular diastolic function.  · Moderate left atrial enlargement.  · Normal right ventricular size with normal right ventricular systolic function.  · Mild right atrial enlargement.  · There is a transcutaneously-placed aortic bioprosthesis present. There is mild paravalvular aortic insufficiency present.  · The aortic valve mean gradient is 4 mmHg with a dimensionless index of 0.91.  · Mild mitral regurgitation.  · Normal central venous pressure (3 mmHg).  · The estimated PA systolic pressure is 32 mmHg.  · Trivial posterior pericardial effusion. And under then RA .      Assessment:     S/P TAVR (transcatheter aortic valve replacement)  Severe aortic stenosis with successful 29 mm evolut Pro TAVR. TTE personally reviewed today which shows mild PVL.     Aortic stenosis  See above    Chronic diastolic heart failure  Compensated on exam    Coronary artery disease involving native coronary artery of native heart with angina pectoris  Stable, asymptomatic. [facility] (Date 10/23/2020 by Dr. Schulte): Non obstructive CAD. On ASA/Statin/BB.     Morbid obesity  BMI. F/u with PCP    Thrombocytopenia  Stable. F/u with PCP      Plan:     1. Follow up with me PRN.   2. ASA indefinitely.   3. SBE prophylaxis for life.  4. Continue to follow up with Dr. Schulte.            Merary Noonan, PA-C  Valve and Structural Heart Disease  [facility]          "

## 2022-01-26 NOTE — PROGRESS NOTES
Subjective:      Patient ID: Ervin Hernandez is a 66 y.o. male.    Chief Complaint: Abnormal Ct Scan and Abnormal Chest X-ray    HPI  Patient was in the hospital and stopped in to review his recent CT that had been done to follow a nodule in the right middle lobe. No new pulmonary complaints.     Lung nodule, pleural base in the right lower lobe stable 1.6 x 1.7  Unchanged from July 2021  Some ground glass nodule changes consistent with past hx of pneumonia.  I don't know why he had a CT and a chest x-ray the same day.  Today's chest x-ray is clear and I do not appreciate any new nodules  S/P TAVR  Doing well overall    No flowsheet data found.  Review of Systems   Constitutional: Negative.    HENT: Negative.    Eyes: Negative.    Respiratory: Negative.         Nodule in right lower lobe.   Stable over a year   Cardiovascular: Negative.         S/P TAVR  Jan 19 2021  Doing well a year later   Genitourinary: Negative.    Musculoskeletal: Negative.    Skin: Negative.    Gastrointestinal: Negative.    Neurological: Negative.    Psychiatric/Behavioral: Negative.      Objective:     Physical Exam  Constitutional:       Appearance: He is well-developed and well-nourished.      Comments: Obese; BMI:37.57    HENT:      Head: Normocephalic and atraumatic.      Right Ear: External ear normal.      Left Ear: External ear normal.   Eyes:      Extraocular Movements: EOM normal.      Conjunctiva/sclera: Conjunctivae normal.      Pupils: Pupils are equal, round, and reactive to light.   Cardiovascular:      Rate and Rhythm: Normal rate and regular rhythm.      Heart sounds: Normal heart sounds.   Pulmonary:      Effort: Pulmonary effort is normal.      Breath sounds: Normal breath sounds.      Comments:  Reviewed both the CT and Chest x-ray done today.  Abdominal:      General: Bowel sounds are normal.      Palpations: Abdomen is soft.   Musculoskeletal:         General: Normal range of motion.      Cervical back: Normal range  of motion and neck supple.   Skin:     General: Skin is warm and dry.   Neurological:      Mental Status: He is alert and oriented to person, place, and time.      Deep Tendon Reflexes: Reflexes are normal and symmetric.   Psychiatric:         Mood and Affect: Mood and affect normal.         Behavior: Behavior normal.         Thought Content: Thought content normal.         Judgment: Judgment normal.         Assessment:     1. Solitary pulmonary nodule      Outpatient Encounter Medications as of 1/25/2022   Medication Sig Dispense Refill    ascorbic acid, vitamin C, (VITAMIN C) 1000 MG tablet Take 1,000 mg by mouth once daily.      aspirin 81 MG Chew Take 81 mg by mouth once daily.      atenolol (TENORMIN) 50 MG tablet Take 1 tablet (50 mg total) by mouth once daily. 90 tablet 3    atorvastatin (LIPITOR) 40 MG tablet Take 40 mg by mouth every evening.       chromium-brindal berry (GARCINIA CAMBOGIA) 200-500 mcg-mg Tab Take 1 tablet by mouth Daily.      co-enzyme Q-10 30 mg capsule Take 100 mg by mouth once daily.       diazePAM (VALIUM) 10 MG Tab Take 10 mg by mouth once daily.       folic acid/multivit-min/lutein (CENTRUM SILVER ORAL) Take by mouth.      furosemide (LASIX) 20 MG tablet Take AS NEEDED, daily for 3 lb weight gain overnight or 5 lbs in 5 days. (Patient not taking: Reported on 1/25/2022) 10 tablet 0    hydroCHLOROthiazide (HYDRODIURIL) 25 MG tablet Take 25 mg by mouth once daily.      QUEtiapine (SEROQUEL) 50 MG tablet Take 50 mg by mouth 2 (two) times daily.       telmisartan (MICARDIS) 80 MG Tab Take 80 mg by mouth once daily.        No facility-administered encounter medications on file as of 1/25/2022.       Plan:     Problem List Items Addressed This Visit    None     Visit Diagnoses     Solitary pulmonary nodule    -  Primary

## 2022-03-11 ENCOUNTER — HOSPITAL ENCOUNTER (OUTPATIENT)
Dept: RADIOLOGY | Facility: OTHER | Age: 67
Discharge: HOME | End: 2022-03-11
Payer: MEDICARE

## 2022-03-11 DIAGNOSIS — F60.5 OBSESSIVE COMPULSIVE PERSONALITY DISORDER: ICD-10-CM

## 2022-03-11 DIAGNOSIS — Z00.00 ENCOUNTER FOR GENERAL ADULT MEDICAL EXAMINATION WITHOUT ABNORMAL FINDINGS: ICD-10-CM

## 2022-03-11 DIAGNOSIS — R46.89 CHANGE IN BEHAVIOR: ICD-10-CM

## 2022-03-11 PROCEDURE — 70450 CT HEAD/BRAIN W/O DYE: CPT | Mod: 26,,, | Performed by: RADIOLOGY

## 2022-03-11 PROCEDURE — 70450 CT HEAD/BRAIN W/O DYE: CPT | Mod: TC

## 2022-03-11 PROCEDURE — 70450 CT HEAD WITHOUT CONTRAST: ICD-10-PCS | Mod: 26,,, | Performed by: RADIOLOGY

## 2024-01-17 ENCOUNTER — TELEPHONE (OUTPATIENT)
Dept: CARDIOTHORACIC SURGERY | Facility: CLINIC | Age: 69
End: 2024-01-17
Payer: MEDICARE

## 2024-01-17 NOTE — TELEPHONE ENCOUNTER
Returned call to pt who stated that his message is intended for Dr. Schulte's office, who he spoke to this morning.    ----- Message from Esmer Robert sent at 1/17/2024  9:25 AM CST -----  Type:  Sooner Appointment Request    Caller is requesting a sooner appointment.  Caller declined first available appointment listed below.  Caller will not accept being placed on the waitlist and is requesting a message be sent to doctor.  Name of Caller:pt  When is the first available appointment?n/a  Symptoms:check up  Would the patient rather a call back or a response via [facility]? call  Best Call Back Number: [phone]  Additional Information:

## 2024-04-11 DIAGNOSIS — Z95.2 S/P TAVR (TRANSCATHETER AORTIC VALVE REPLACEMENT): Primary | ICD-10-CM

## 2024-04-29 RX ORDER — FLUVOXAMINE MALEATE 100 MG/1
100 TABLET, COATED ORAL NIGHTLY
COMMUNITY

## 2024-04-29 RX ORDER — ZOLPIDEM TARTRATE 10 MG/1
10 TABLET ORAL NIGHTLY PRN
COMMUNITY

## 2024-04-29 RX ORDER — TAMSULOSIN HYDROCHLORIDE 0.4 MG/1
0.4 CAPSULE ORAL DAILY
COMMUNITY

## 2024-04-30 ENCOUNTER — ANESTHESIA EVENT (OUTPATIENT)
Dept: ENDOSCOPY | Facility: OTHER | Age: 69
End: 2024-04-30
Payer: MEDICARE

## 2024-04-30 NOTE — OR NURSING
Instructed patient to report to [facility] for 530 am on 5/3. Reviewed current medications and allergies to best of patient knowledge. No questions regarding prep for Colonoscopy. Waylon Confirmed.

## 2024-05-03 ENCOUNTER — ANESTHESIA (OUTPATIENT)
Dept: ENDOSCOPY | Facility: OTHER | Age: 69
End: 2024-05-03
Payer: MEDICARE

## 2024-05-03 ENCOUNTER — HOSPITAL ENCOUNTER (OUTPATIENT)
Facility: OTHER | Age: 69
Discharge: HOME | End: 2024-05-03
Attending: INTERNAL MEDICINE | Admitting: INTERNAL MEDICINE
Payer: MEDICARE

## 2024-05-03 VITALS
TEMPERATURE: 99 F | BODY MASS INDEX: 45.18 KG/M2 | RESPIRATION RATE: 20 BRPM | OXYGEN SATURATION: 95 % | HEART RATE: 79 BPM | WEIGHT: 305 LBS | DIASTOLIC BLOOD PRESSURE: 73 MMHG | HEIGHT: 69 IN | SYSTOLIC BLOOD PRESSURE: 136 MMHG

## 2024-05-03 DIAGNOSIS — Z12.11 ENCOUNTER FOR SCREENING COLONOSCOPY: ICD-10-CM

## 2024-05-03 PROCEDURE — 63600175 PHARM REV CODE 636 W HCPCS: Performed by: ANESTHESIOLOGY

## 2024-05-03 PROCEDURE — 25000003 PHARM REV CODE 250: Performed by: NURSE ANESTHETIST, CERTIFIED REGISTERED

## 2024-05-03 PROCEDURE — 37000009 HC ANESTHESIA EA ADD 15 MINS: Performed by: INTERNAL MEDICINE

## 2024-05-03 PROCEDURE — 63600175 PHARM REV CODE 636 W HCPCS: Performed by: NURSE ANESTHETIST, CERTIFIED REGISTERED

## 2024-05-03 PROCEDURE — 37000008 HC ANESTHESIA 1ST 15 MINUTES: Performed by: INTERNAL MEDICINE

## 2024-05-03 PROCEDURE — D9220A PRA ANESTHESIA: Mod: ,,, | Performed by: NURSE ANESTHETIST, CERTIFIED REGISTERED

## 2024-05-03 PROCEDURE — G0121 COLON CA SCRN NOT HI RSK IND: HCPCS | Performed by: INTERNAL MEDICINE

## 2024-05-03 RX ORDER — MV-MIN/FOLIC/K1/LYCOPEN/LUTEIN 300-60 MCG
TABLET ORAL
COMMUNITY

## 2024-05-03 RX ORDER — ETOMIDATE 2 MG/ML
INJECTION INTRAVENOUS
Status: DISCONTINUED | OUTPATIENT
Start: 2024-05-03 | End: 2024-05-03

## 2024-05-03 RX ORDER — CHOLECALCIFEROL (VITAMIN D3) 25 MCG
TABLET ORAL
COMMUNITY

## 2024-05-03 RX ORDER — ONDANSETRON HYDROCHLORIDE 2 MG/ML
4 INJECTION, SOLUTION INTRAVENOUS DAILY PRN
Status: DISCONTINUED | OUTPATIENT
Start: 2024-05-03 | End: 2024-05-03 | Stop reason: HOSPADM

## 2024-05-03 RX ORDER — SODIUM CHLORIDE 0.9 % (FLUSH) 0.9 %
3 SYRINGE (ML) INJECTION
Status: DISCONTINUED | OUTPATIENT
Start: 2024-05-03 | End: 2024-05-03 | Stop reason: HOSPADM

## 2024-05-03 RX ORDER — SODIUM CHLORIDE 0.9 % (FLUSH) 0.9 %
10 SYRINGE (ML) INJECTION
Status: DISCONTINUED | OUTPATIENT
Start: 2024-05-03 | End: 2024-05-03 | Stop reason: HOSPADM

## 2024-05-03 RX ORDER — HYDROMORPHONE HYDROCHLORIDE 2 MG/ML
0.4 INJECTION, SOLUTION INTRAMUSCULAR; INTRAVENOUS; SUBCUTANEOUS EVERY 5 MIN PRN
Status: DISCONTINUED | OUTPATIENT
Start: 2024-05-03 | End: 2024-05-03 | Stop reason: HOSPADM

## 2024-05-03 RX ORDER — OXYCODONE HYDROCHLORIDE 5 MG/1
5 TABLET ORAL
Status: DISCONTINUED | OUTPATIENT
Start: 2024-05-03 | End: 2024-05-03 | Stop reason: HOSPADM

## 2024-05-03 RX ORDER — PROPOFOL 10 MG/ML
VIAL (ML) INTRAVENOUS
Status: DISCONTINUED | OUTPATIENT
Start: 2024-05-03 | End: 2024-05-03

## 2024-05-03 RX ORDER — UBIDECARENONE 75 MG
CAPSULE ORAL
COMMUNITY

## 2024-05-03 RX ORDER — KETAMINE HCL IN 0.9 % NACL 50 MG/5 ML
SYRINGE (ML) INTRAVENOUS
Status: DISCONTINUED | OUTPATIENT
Start: 2024-05-03 | End: 2024-05-03

## 2024-05-03 RX ORDER — LIDOCAINE HYDROCHLORIDE 20 MG/ML
INJECTION INTRAVENOUS
Status: DISCONTINUED | OUTPATIENT
Start: 2024-05-03 | End: 2024-05-03

## 2024-05-03 RX ORDER — MEPERIDINE HYDROCHLORIDE 25 MG/ML
12.5 INJECTION INTRAMUSCULAR; INTRAVENOUS; SUBCUTANEOUS ONCE AS NEEDED
Status: DISCONTINUED | OUTPATIENT
Start: 2024-05-03 | End: 2024-05-03 | Stop reason: HOSPADM

## 2024-05-03 RX ADMIN — Medication 10 MG: at 07:05

## 2024-05-03 RX ADMIN — PROPOFOL 20 MG: 10 INJECTION, EMULSION INTRAVENOUS at 07:05

## 2024-05-03 RX ADMIN — PROPOFOL 30 MG: 10 INJECTION, EMULSION INTRAVENOUS at 07:05

## 2024-05-03 RX ADMIN — ETOMIDATE 5 MG: 2 INJECTION, SOLUTION INTRAVENOUS at 07:05

## 2024-05-03 RX ADMIN — Medication 15 MG: at 07:05

## 2024-05-03 RX ADMIN — ETOMIDATE 2 MG: 2 INJECTION, SOLUTION INTRAVENOUS at 07:05

## 2024-05-03 RX ADMIN — LIDOCAINE HYDROCHLORIDE 50 MG: 20 INJECTION, SOLUTION INTRAVENOUS at 07:05

## 2024-05-03 RX ADMIN — SODIUM CHLORIDE, SODIUM LACTATE, POTASSIUM CHLORIDE, AND CALCIUM CHLORIDE: .6; .31; .03; .02 INJECTION, SOLUTION INTRAVENOUS at 06:05

## 2024-05-03 NOTE — PLAN OF CARE
Ervin Hernandez has met all discharge criteria from Phase II. Vital Signs are stable, ambulating  without difficulty. Discharge instructions given, patient verbalized understanding. Discharged from facility via wheelchair in stable condition.

## 2024-05-03 NOTE — ANESTHESIA POSTPROCEDURE EVALUATION
Anesthesia Post Evaluation    Patient: Ervin Hernandez    Procedure(s) Performed: Procedure(s) (LRB):  COLONOSCOPY (N/A)    Final Anesthesia Type: MAC      Patient location during evaluation: OPS  Patient participation: Yes- Able to Participate  Level of consciousness: awake and alert  Post-procedure vital signs: reviewed and stable  Pain management: adequate  Airway patency: patent    PONV status at discharge: No PONV  Anesthetic complications: no      Cardiovascular status: blood pressure returned to baseline and hemodynamically stable  Respiratory status: unassisted, spontaneous ventilation and room air  Hydration status: euvolemic  Follow-up not needed.              Vitals Value Taken Time   /70 05/03/24 0530   Temp 36.9 °C (98.5 °F) 05/03/24 0530   Pulse 67 05/03/24 0530   Resp 18 05/03/24 0530   SpO2 94 % 05/03/24 0530         No case tracking events are documented in the log.      Pain/Vianca Score: No data recorded

## 2024-05-03 NOTE — DISCHARGE SUMMARY
[facility] Endoscopy  Discharge Note  Short Stay    Procedure(s) (LRB):  COLONOSCOPY (N/A)      OUTCOME: Patient tolerated treatment/procedure well without complication and is now ready for discharge.    DISPOSITION: Home or Self Care    FINAL DIAGNOSIS:  diverticulosis    FOLLOWUP: None    DISCHARGE INSTRUCTIONS:  1) Resume all medications and previous diet  2) Call office for any questions or concerns  3) Colonoscopy 10 years

## 2024-05-03 NOTE — ANESTHESIA PREPROCEDURE EVALUATION
05/03/2024  Ervin Hernandez is a 68 y.o., male.      Pre-op Assessment    I have reviewed the Patient Summary Reports.     I have reviewed the Nursing Notes. I have reviewed the NPO Status.   I have reviewed the Medications.     Review of Systems  Anesthesia Hx:             Denies Family Hx of Anesthesia complications.    Denies Personal Hx of Anesthesia complications.                    Social:  Non-Smoker       Hematology/Oncology:  Hematology Normal   Oncology Normal                                   Cardiovascular:     Hypertension Valvular problems/Murmurs (s/p TAVR)  CAD      Angina CHF    hyperlipidemia    Echo:  Echocardiography Findings    Left Ventricle The left ventricle is normal in size with normal systolic function. The estimated ejection fraction is 65%. The wall thickness is normal. There is indeterminate diastolic pressure. There is normal wall motion.  Right Ventricle Normal cavity size, wall thickness and systolic function. Wall motion normal.  Left Atrium There is moderate left atrial enlargement. Left atrial volume index is 47.4 mL/m2.  Right Atrium The right atrium is mildly enlarged.  Aortic Valve There is a transcutaneously-placed bioprosthesis present. There is mild paravalvular insufficiency present.  Mitral Valve The mitral valve appears structurally normal. There is normal leaflet mobility.  There is mild mitral annular calcification. There is mild mitral valve regurgitation. The estimated mitral valve area by pressure half time is 2.94 cm2.  Tricuspid Valve The tricuspid valve appears structurally normal. There is trace regurgitation. The estimated PA systolic pressure is greater than 29 mmHg.  Pulmonic Valve Pulmonic valve is structurally normal.  IVC/SVC Normal central venous pressure (3 mm Hg).  Ascending Aorta The aortic root and ascending aorta are normal in  size.  Pericardium and Other Findings There is a trivial posterior pericardial effusion. And under then RA .                             Pulmonary:  Pulmonary Normal                       Renal/:  Chronic Renal Disease, renal hypertension                Hepatic/GI:  Hepatic/GI Normal                 Neurological:  Neurology Normal                                      Endocrine:        Morbid Obesity / BMI > 40  Psych:  Psychiatric History (OCD)                  Physical Exam  General: Well nourished, Cooperative, Alert and Oriented    Airway:  Mallampati: III   Mouth Opening: Normal  TM Distance: Normal  Tongue: Normal  Neck ROM: Normal ROM    Dental:  Intact, Caps / Implants        Anesthesia Plan  Type of Anesthesia, risks & benefits discussed:    Anesthesia Type: MAC, Gen ETT  Intra-op Monitoring Plan: Standard ASA Monitors  Post Op Pain Control Plan: multimodal analgesia  Induction:  IV  Airway Plan: Video, Post-Induction  Informed Consent: Informed consent signed with the Patient and all parties understand the risks and agree with anesthesia plan.  All questions answered.   ASA Score: 3  Anesthesia Plan Notes: MAC vs GETA s/s BMI    Ready For Surgery From Anesthesia Perspective.     .

## 2024-05-03 NOTE — PROVATION PATIENT INSTRUCTIONS
Discharge Summary/Instructions after an Endoscopic Procedure  Patient Name: Ervin Hernandez  Patient MRN: [id number]  Patient YOB: 1955  Friday, May 3, 2024  Lj Tamayo MD  RESTRICTIONS:  During your procedure today, you received medications for sedation.  These   medications may affect your judgment, balance and coordination.  Therefore,   for 24 hours, you have the following restrictions:   - DO NOT drive a car, operate machinery, make legal/financial decisions,   sign important papers or drink alcohol.    ACTIVITY:  Today: no heavy lifting, straining or running due to procedural   sedation/anesthesia.  The following day: return to full activity including work.  DIET:  Eat and drink normally unless instructed otherwise.     TREATMENT FOR COMMON SIDE EFFECTS:  - Mild abdominal pain, nausea, belching, bloating or excessive gas:  rest,   eat lightly and use a heating pad.  - Sore Throat: treat with throat lozenges and/or gargle with warm salt   water.  - Because air was used during the procedure, expelling large amounts of air   from your rectum or belching is normal.  - If a bowel prep was taken, you may not have a bowel movement for 1-3 days.    This is normal.  SYMPTOMS TO WATCH FOR AND REPORT TO YOUR PHYSICIAN:  1. Abdominal pain or bloating, other than gas cramps.  2. Chest pain.  3. Back pain.  4. Signs of infection such as: chills or fever occurring within 24 hours   after the procedure.  5. Rectal bleeding, which would show as bright red, maroon, or black stools.   (A tablespoon of blood from the rectum is not serious, especially if   hemorrhoids are present.)  6. Vomiting.  7. Weakness or dizziness.  GO DIRECTLY TO THE NEAREST EMERGENCY ROOM IF YOU HAVE ANY OF THE FOLLOWING:      Difficulty breathing              Chills and/or fever over 101 F   Persistent vomiting and/or vomiting blood   Severe abdominal pain   Severe chest pain   Black, tarry stools   Bleeding- more than one tablespoon   Any  other symptom or condition that you feel may need urgent attention  Your doctor recommends these additional instructions:  If any biopsies were taken, your doctors clinic will contact you in 1 to 2   weeks with any results.  - Discharge patient to home (via wheelchair).   - Resume previous diet.   - Continue present medications.   - Telephone GI clinic if symptomatic.  For questions, problems or results please call your physician - Lj Tamayo MD at Work:  [phone].  [facility], EMERGENCY ROOM PHONE NUMBER: [phone], [facility]   [phone].  IF A COMPLICATION OR EMERGENCY SITUATION ARISES AND YOU ARE UNABLE TO REACH   YOUR PHYSICIAN - GO DIRECTLY TO THE EMERGENCY ROOM.  Lj Tamayo MD  5/3/2024 7:27:25 AM  This report has been verified and signed electronically.  Dear patient,  As a result of recent federal legislation (The Federal Cures Act), you may   receive lab or pathology results from your procedure in your [facility]   account before your physician is able to contact you. Your physician or   their representative will relay the results to you with their   recommendations at their soonest availability.  Thank you,  PROVATION

## 2024-05-03 NOTE — H&P
[facility] - Endoscopy  Gastroenterology  H&P    Patient Name: Ervin Hernandez  MRN: [id number]  Admission Date: 5/3/2024  Code Status: Full Code    Attending Provider:   Primary Care Physician: Merary Phillip MD  Principal Problem:<principal problem not specified>    Subjective:     History of Present Illness: This gent is here for a colon screen. He has ABDULAZIZ and CAd with valvular heart disease and the procedure was discussed in detail.    Past Medical History:   Diagnosis Date    Hypercholesteremia     Hypercholesterolemia 9/5/2019 1995: Began statin.    Hypertension     Hypertension 9/5/2019 1995: Diagnosed.    Kidney stones     Mitral valve prolapse 9/5/2019 1995: Diagnosed.    Morbid obesity 9/5/2019 9/5/2019: Weight 127 kg. BMI 41.    OCD (obsessive compulsive disorder)        Past Surgical History:   Procedure Laterality Date    CARDIAC VALVE SURGERY      CATHETERIZATION OF BOTH LEFT AND RIGHT HEART N/A 10/23/2020    Procedure: CATHETERIZATION, HEART, BOTH LEFT AND RIGHT;  Surgeon: Dusty Schulte MD;  Location: [facility] CATH LAB;  Service: Cardiology;  Laterality: N/A;    COLONOSCOPY      EYE SURGERY Bilateral     cataract  2914 & 2019    tear duct surgery to the left eye - 1973      TRANSCATHETER AORTIC VALVE REPLACEMENT (TAVR) N/A 1/19/2021    Procedure: REPLACEMENT, AORTIC VALVE, TRANSCATHETER (TAVR);  Surgeon: Luis F Plaza MD;  Location: [facility] CATH LAB;  Service: Cardiology;  Laterality: N/A;       Review of patient's allergies indicates:   Allergen Reactions    Sulfa (sulfonamide antibiotics) Other (See Comments)     bleeding     Family History       Problem Relation (Age of Onset)    Heart disease Father, Brother    No Known Problems Mother, Sister, Maternal Aunt, Maternal Uncle, Paternal Aunt, Paternal Uncle, Maternal Grandmother, Maternal Grandfather, Paternal Grandmother, Paternal Grandfather          Tobacco Use    Smoking status: Never    Smokeless tobacco: Never   Substance  and Sexual Activity    Alcohol use: Yes     Comment: 3 beers occ    Drug use: No    Sexual activity: Not on file     Review of Systems   Constitutional:  Positive for activity change.   HENT: Negative.     Eyes: Negative.    Respiratory:  Positive for apnea.    Cardiovascular: Negative.    Gastrointestinal: Negative.    Endocrine: Negative.    Genitourinary: Negative.    Musculoskeletal:  Positive for arthralgias, back pain and gait problem.   Allergic/Immunologic: Negative.    Neurological:  Positive for dizziness. Negative for weakness.   Hematological: Negative.    Psychiatric/Behavioral: Negative.       Objective:     Vital Signs (Most Recent):  Temp: 98.5 °F (36.9 °C) (05/03/24 0530)  Pulse: 67 (05/03/24 0530)  Resp: 18 (05/03/24 0530)  BP: 133/70 (05/03/24 0530)  SpO2: (!) 94 % (05/03/24 0530) Vital Signs (24h Range):  Temp:  [98.5 °F (36.9 °C)] 98.5 °F (36.9 °C)  Pulse:  [67] 67  Resp:  [18] 18  SpO2:  [94 %] 94 %  BP: (133)/(70) 133/70     Weight: (!) 138.3 kg (305 lb) (05/03/24 0530)  Body mass index is 45.04 kg/m².    No intake or output data in the 24 hours ending 05/03/24 0702    Lines/Drains/Airways       Peripheral Intravenous Line  Duration                  Peripheral IV - Single Lumen 05/03/24 0626 22 G Left Hand <1 day                    Physical Exam  Vitals and nursing note reviewed.   Constitutional:       Appearance: Normal appearance.   HENT:      Head: Normocephalic and atraumatic.      Nose: Nose normal.      Mouth/Throat:      Mouth: Mucous membranes are dry.   Eyes:      Extraocular Movements: Extraocular movements intact.      Pupils: Pupils are equal, round, and reactive to light.   Cardiovascular:      Rate and Rhythm: Normal rate.   Pulmonary:      Effort: Pulmonary effort is normal.      Breath sounds: Normal breath sounds.   Abdominal:      General: Abdomen is flat.      Palpations: Abdomen is soft.   Musculoskeletal:         General: No swelling or tenderness. Normal range of motion.       Cervical back: Normal range of motion and neck supple.   Skin:     General: Skin is warm and dry.   Neurological:      General: No focal deficit present.      Mental Status: He is alert.   Psychiatric:         Mood and Affect: Mood normal.         Thought Content: Thought content normal.         Judgment: Judgment normal.         Significant Labs:  none    Significant Imaging:  none    Assessment/Plan: This gent is here for a colon screen and will cautiously proceed.     There are no hospital problems to display for this patient.          Lj Tamayo MD  Gastroenterology  [facility] - Endoscopy

## 2024-05-08 ENCOUNTER — HOSPITAL ENCOUNTER (OUTPATIENT)
Dept: CARDIOLOGY | Facility: HOSPITAL | Age: 69
Discharge: HOME | End: 2024-05-08
Attending: INTERNAL MEDICINE
Payer: MEDICARE

## 2024-05-08 ENCOUNTER — EDUCATION (OUTPATIENT)
Dept: CARDIOLOGY | Facility: CLINIC | Age: 69
End: 2024-05-08

## 2024-05-08 ENCOUNTER — OFFICE VISIT (OUTPATIENT)
Dept: CARDIOLOGY | Facility: CLINIC | Age: 69
End: 2024-05-08
Payer: MEDICARE

## 2024-05-08 VITALS
OXYGEN SATURATION: 98 % | WEIGHT: 304.69 LBS | HEIGHT: 69 IN | HEART RATE: 65 BPM | DIASTOLIC BLOOD PRESSURE: 74 MMHG | BODY MASS INDEX: 45.13 KG/M2 | SYSTOLIC BLOOD PRESSURE: 143 MMHG

## 2024-05-08 VITALS — HEIGHT: 69 IN | BODY MASS INDEX: 45.18 KG/M2 | WEIGHT: 305 LBS

## 2024-05-08 DIAGNOSIS — I35.0 NONRHEUMATIC AORTIC VALVE STENOSIS: Primary | ICD-10-CM

## 2024-05-08 DIAGNOSIS — I25.119 CORONARY ARTERY DISEASE INVOLVING NATIVE CORONARY ARTERY OF NATIVE HEART WITH ANGINA PECTORIS: ICD-10-CM

## 2024-05-08 DIAGNOSIS — Z95.2 S/P TAVR (TRANSCATHETER AORTIC VALVE REPLACEMENT): Primary | ICD-10-CM

## 2024-05-08 DIAGNOSIS — I10 HYPERTENSION, UNSPECIFIED TYPE: ICD-10-CM

## 2024-05-08 DIAGNOSIS — E66.01 MORBID OBESITY: ICD-10-CM

## 2024-05-08 DIAGNOSIS — I50.32 CHRONIC DIASTOLIC HEART FAILURE: ICD-10-CM

## 2024-05-08 DIAGNOSIS — I35.0 NONRHEUMATIC AORTIC VALVE STENOSIS: ICD-10-CM

## 2024-05-08 DIAGNOSIS — E78.00 HYPERCHOLESTEROLEMIA: ICD-10-CM

## 2024-05-08 DIAGNOSIS — Z95.2 S/P TAVR (TRANSCATHETER AORTIC VALVE REPLACEMENT): ICD-10-CM

## 2024-05-08 LAB
ASCENDING AORTA: 3.04 CM
AV INDEX (PROSTH): 0.25
AV MEAN GRADIENT: 50 MMHG
AV PEAK GRADIENT: 77 MMHG
AV VALVE AREA BY VELOCITY RATIO: 0.86 CM²
AV VALVE AREA: 0.94 CM²
AV VELOCITY RATIO: 0.23
BSA FOR ECHO PROCEDURE: 2.6 M2
CV ECHO LV RWT: 0.37 CM
DOP CALC AO PEAK VEL: 4.4 M/S
DOP CALC AO VTI: 94.5 CM
DOP CALC LVOT AREA: 3.8 CM2
DOP CALC LVOT DIAMETER: 2.2 CM
DOP CALC LVOT PEAK VEL: 1 M/S
DOP CALC LVOT STROKE VOLUME: 88.53 CM3
DOP CALCLVOT PEAK VEL VTI: 23.3 CM
E WAVE DECELERATION TIME: 390.36 MSEC
E/A RATIO: 0.8
E/E' RATIO: 11.29 M/S
ECHO LV POSTERIOR WALL: 1.1 CM (ref 0.6–1.1)
EJECTION FRACTION: 65 %
FRACTIONAL SHORTENING: 31 % (ref 28–44)
INTERVENTRICULAR SEPTUM: 1.1 CM (ref 0.6–1.1)
LA MAJOR: 5.68 CM
LA MINOR: 5.4 CM
LA WIDTH: 4.4 CM
LEFT ATRIUM SIZE: 5.06 CM
LEFT ATRIUM VOLUME INDEX MOD: 36.9 ML/M2
LEFT ATRIUM VOLUME INDEX: 42.4 ML/M2
LEFT ATRIUM VOLUME MOD: 91.11 CM3
LEFT ATRIUM VOLUME: 104.77 CM3
LEFT INTERNAL DIMENSION IN SYSTOLE: 4.1 CM (ref 2.1–4)
LEFT VENTRICLE DIASTOLIC VOLUME INDEX: 75.02 ML/M2
LEFT VENTRICLE DIASTOLIC VOLUME: 185.31 ML
LEFT VENTRICLE MASS INDEX: 110 G/M2
LEFT VENTRICLE SYSTOLIC VOLUME INDEX: 30.1 ML/M2
LEFT VENTRICLE SYSTOLIC VOLUME: 74.23 ML
LEFT VENTRICULAR INTERNAL DIMENSION IN DIASTOLE: 5.9 CM (ref 3.5–6)
LEFT VENTRICULAR MASS: 271.88 G
LV LATERAL E/E' RATIO: 11.29 M/S
LV SEPTAL E/E' RATIO: 11.29 M/S
MV PEAK A VEL: 0.99 M/S
MV PEAK E VEL: 0.79 M/S
MV STENOSIS PRESSURE HALF TIME: 113.21 MS
MV VALVE AREA P 1/2 METHOD: 1.94 CM2
OHS CV RV/LV RATIO: 0.79 CM
RA MAJOR: 4.97 CM
RA PRESSURE ESTIMATED: 3 MMHG
RA WIDTH: 4.25 CM
RIGHT VENTRICULAR END-DIASTOLIC DIMENSION: 4.66 CM
SINUS: 3.27 CM
STJ: 2.04 CM
TDI LATERAL: 0.07 M/S
TDI SEPTAL: 0.07 M/S
TDI: 0.07 M/S
TRICUSPID ANNULAR PLANE SYSTOLIC EXCURSION: 1.91 CM
Z-SCORE OF LEFT VENTRICULAR DIMENSION IN END DIASTOLE: -7.82
Z-SCORE OF LEFT VENTRICULAR DIMENSION IN END SYSTOLE: -4.96

## 2024-05-08 PROCEDURE — 1126F AMNT PAIN NOTED NONE PRSNT: CPT | Mod: CPTII,S$GLB,, | Performed by: INTERNAL MEDICINE

## 2024-05-08 PROCEDURE — 93306 TTE W/DOPPLER COMPLETE: CPT

## 2024-05-08 PROCEDURE — 1101F PT FALLS ASSESS-DOCD LE1/YR: CPT | Mod: CPTII,S$GLB,, | Performed by: INTERNAL MEDICINE

## 2024-05-08 PROCEDURE — 3078F DIAST BP <80 MM HG: CPT | Mod: CPTII,S$GLB,, | Performed by: INTERNAL MEDICINE

## 2024-05-08 PROCEDURE — 3288F FALL RISK ASSESSMENT DOCD: CPT | Mod: CPTII,S$GLB,, | Performed by: INTERNAL MEDICINE

## 2024-05-08 PROCEDURE — 1159F MED LIST DOCD IN RCRD: CPT | Mod: CPTII,S$GLB,, | Performed by: INTERNAL MEDICINE

## 2024-05-08 PROCEDURE — 99999 PR PBB SHADOW E&M-EST. PATIENT-LVL V: CPT | Mod: PBBFAC,,, | Performed by: INTERNAL MEDICINE

## 2024-05-08 PROCEDURE — 3008F BODY MASS INDEX DOCD: CPT | Mod: CPTII,S$GLB,, | Performed by: INTERNAL MEDICINE

## 2024-05-08 PROCEDURE — 3075F SYST BP GE 130 - 139MM HG: CPT | Mod: CPTII,S$GLB,, | Performed by: INTERNAL MEDICINE

## 2024-05-08 PROCEDURE — 99215 OFFICE O/P EST HI 40 MIN: CPT | Mod: S$GLB,,, | Performed by: INTERNAL MEDICINE

## 2024-05-08 PROCEDURE — 93306 TTE W/DOPPLER COMPLETE: CPT | Mod: 26,,, | Performed by: INTERNAL MEDICINE

## 2024-05-08 PROCEDURE — 4010F ACE/ARB THERAPY RXD/TAKEN: CPT | Mod: CPTII,S$GLB,, | Performed by: INTERNAL MEDICINE

## 2024-05-08 RX ORDER — WARFARIN SODIUM 5 MG/1
5 TABLET ORAL DAILY
Qty: 90 TABLET | Refills: 3 | Status: ON HOLD | OUTPATIENT
Start: 2024-05-08 | End: 2024-06-17 | Stop reason: HOSPADM

## 2024-05-08 NOTE — PROGRESS NOTES
OUTPATIENT CATHETERIZATION INSTRUCTIONS    If you should have any questions, concerns, or need to change the date of your procedure, please call  CHRISTAL Ash @ [phone]      You have been scheduled for a procedure in the catheterization lab on Monday, June 17, 2024.     Please report to the Cardiology Waiting Area on the Third floor of the hospital and check in at 10:00AM.  You will then be taken to the SSCU (Short Stay Cardiac Unit) and prepared for your procedure. Please be aware that this is not the time of your procedure but the time you are to arrive. The procedures are scheduled on an hourly basis; however, emergency cases take precedence over all other cases.       Nothing to eat or drink after MIDNIGHT 12 AM You may have clear liquids until the time of your admission which should be 2 hours prior to your procedure.          You are encouraged to drink at least 8 ounces of clear liquids prior to your admission to SSCU.          Clear liquids include water, black coffee, clear juices, and performance drinks - no pulp or milk.         .    2.   You may take your regular morning medications with water.       3.   Hold the following medications prior to your procedure:     Hold Coumadin day of procedure.                        The procedure will take 1-2 hours to perform. During the procedure you will receive IV sedation administered by a nurse.  Most patients will sleep through procedure.  After the procedure, you will either return to SSCU or spend some time in the cath lab recovery room.  If appropriate, your family will be able to stay with you during this time.      You should be discharged home that same day if you are stable and there are no complications.  Your doctor will determine whether you are discharged or kept overnight  based on your particular procedure and time that procedure is completed.   The results of your procedure will be discussed with you before you are discharged.     YOU WILL NOT  BE ABLE TO DRIVE YOURSELF HOME.  SOMEONE MUST BE ABLE TO DRIVE YOU HOME FROM HOSPITAL.                       INSTRUCTIONS WERE GIVEN TO THE PATIENT VERBALLY AND THEY VERBALIZED UNDERSTANDING.  THEY DO NOT REQUIRE ANY SPECIAL NEEDS AND DO NOT HAVE ANY LEARNING BARRIERS.      Directions for Reporting to Cardiology Waiting Area in the Hospital  If you park in the Parking Garage:  Take elevators to the1st floor of the parking garage.  Continue past the gift shop, coffee shop, and piano.  Take a right and go to the gold elevators. (Elevator B)  Take the elevator to the 3rd floor.  Follow the arrow on the sign on the wall that says Cath Lab Registration/EP Lab Registration.  Follow the long hallway all the way around until you come to a big open area.  This is the registration area.  Check in at Reception Desk.    OR    If family is dropping you off:  Have them drop you off at the front of the Hospital under the green overhang.  Enter through the doors and take a right.  Take the E elevators to the 3rd floor Cardiology Waiting Area.  Check in at the Reception Desk in the waiting room.

## 2024-05-08 NOTE — ASSESSMENT & PLAN NOTE
No angina. Will need to evalaute his coronary anatomy for potential TAV in TAV if his gradient does not improve with coumadin.

## 2024-05-08 NOTE — ASSESSMENT & PLAN NOTE
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 Will start coumadin and will start wotkup of structural deterioration of bioprosthetic valve.

## 2024-05-08 NOTE — PROGRESS NOTES
PCP - Merary Phillip MD  Referring Physician: Dr. Schulte    Subjective:   Patient ID:  Ervin Hernandez is a 68 y.o. y.o. male who presents for evaluation and treatment of   TAVR valve degeneration leading to AS.      Here for TAVR follow up. Recently had a colonoscopy and a follow up TTE showed severe AS. Has NYHA class III symptoms. Has been seen by pulmonology who reviewed his recent CT that had been done to follow a nodule in the right middle lobe. No new pulmonary complaints. Lung nodule, pleural base in the right lower lobe stable 1.6 x 1.7  Unchanged from July 2021. Some ground glass nodule changes consistent with past hx of pneumonia.     TAVR work-up:      ECHO (Date 5/2024): MARCELA= 0.94 cm2, MG= 50mmHg, Peak Eusebio= 4.4 m/s, EF= 65%.   LHC (Date 10/23/2020 by Dr. Schulte): Non obstructive CAD. WILL REPEAT  STS: 1.5%   Frailty: 0/4   Iliacs are >9.69 on R and > 9.99 on L   LVOT repeat CT  Incidental findings on CT: Bilateral calcified and noncalcified pulmonary nodules.  Largest nodule measures 1.5 cm in the medial basal segment of the right lower lobe need follow up with PCP (communicated with his PCP).  CT Surgery risk assessment: Pending evaluation  Rhythm issues: High Risk for PPM with NSR and incomplete RBBB.  PFTs: FEV1 % predicted, DLCO % predicted.  Comorbidities:Obesity, hypertension and hyperlipidemia.        Has a 29 mm Evolut Pro with ECHO findings suggestive of valve obstruction.     Today patient mentions no angina, heart failure symptoms, claudication or palpitations. He denies syncope, or near syncope.      History:     Social History     Tobacco Use    Smoking status: Never    Smokeless tobacco: Never   Substance Use Topics    Alcohol use: Yes     Comment: 3 beers occ     Family History   Problem Relation Name Age of Onset    No Known Problems Mother      Heart disease Father      No Known Problems Sister 1     Heart disease Brother 1     No Known Problems Maternal Aunt      No  Known Problems Maternal Uncle      No Known Problems Paternal Aunt      No Known Problems Paternal Uncle      No Known Problems Maternal Grandmother      No Known Problems Maternal Grandfather      No Known Problems Paternal Grandmother      No Known Problems Paternal Grandfather      Anemia Neg Hx      Arrhythmia Neg Hx      Asthma Neg Hx      Clotting disorder Neg Hx      Fainting Neg Hx      Heart attack Neg Hx      Heart failure Neg Hx      Hyperlipidemia Neg Hx      Hypertension Neg Hx      Stroke Neg Hx      Atrial Septal Defect Neg Hx         Meds:     Review of patient's allergies indicates:   Allergen Reactions    Sulfa (sulfonamide antibiotics) Other (See Comments)     bleeding       Current Outpatient Medications:     ascorbic acid, vitamin C, (VITAMIN C) 1000 MG tablet, Take 1,000 mg by mouth once daily., Disp: , Rfl:     aspirin 81 MG Chew, Take 81 mg by mouth once daily., Disp: , Rfl:     atenoloL (TENORMIN) 50 MG tablet, TAKE 1 TABLET BY MOUTH EVERY DAY, Disp: 90 tablet, Rfl: 3    atorvastatin (LIPITOR) 40 MG tablet, Take 40 mg by mouth every evening. , Disp: , Rfl:     chromium-brindal berry (GARCINIA CAMBOGIA) 200-500 mcg-mg Tab, Take 1 tablet by mouth Daily. Actually 900 mg, Disp: , Rfl:     co-enzyme Q-10 30 mg capsule, Take 100 mg by mouth once daily. , Disp: , Rfl:     cyanocobalamin 500 MCG tablet, 1 tablet Orally Once a day, Disp: , Rfl:     fluvoxaMINE (LUVOX) 100 MG tablet, Take 100 mg by mouth every evening., Disp: , Rfl:     folic acid/multivit-min/lutein (CENTRUM SILVER ORAL), Take by mouth., Disp: , Rfl:     furosemide (LASIX) 20 MG tablet, Take AS NEEDED, daily for 3 lb weight gain overnight or 5 lbs in 5 days., Disp: 10 tablet, Rfl: 0    mv-min-folic-K1-lycopen-lutein (CENTRUM SILVER ULTRA MEN'S) 948--300 mcg Tab, Centrum Silver Ultra Mens, Disp: , Rfl:     QUEtiapine (SEROQUEL) 50 MG tablet, Take 50 mg by mouth 2 (two) times daily. , Disp: , Rfl:     tamsulosin (FLOMAX) 0.4 mg  "Cap, Take 0.4 mg by mouth once daily., Disp: , Rfl:     telmisartan (MICARDIS) 80 MG Tab, TAKE 1 TABLET BY MOUTH EVERY DAY, Disp: 90 tablet, Rfl: 4    vitamin D (VITAMIN D3) 1000 units Tab, 1 tablet Orally Once a day, Disp: , Rfl:     zolpidem (AMBIEN) 10 mg Tab, Take 10 mg by mouth nightly as needed (1/2 as needed for sleep)., Disp: , Rfl:     Review of Systems   Constitutional:  Negative for chills, fever, malaise/fatigue and weight loss.   HENT:  Negative for ear pain, hearing loss and tinnitus.    Respiratory:  Negative for cough, hemoptysis, sputum production and shortness of breath.    Cardiovascular:  Negative for chest pain, palpitations, orthopnea, claudication, leg swelling and PND.   Gastrointestinal:  Negative for abdominal pain, diarrhea, heartburn, nausea and vomiting.   Genitourinary:  Negative for dysuria and urgency.   Musculoskeletal:  Negative for back pain, myalgias and neck pain.   Neurological:  Negative for dizziness and headaches.   Psychiatric/Behavioral:  Negative for depression.      Objective:     Vitals:    05/08/24 1408 05/08/24 1410   BP: 130/69 (!) 143/74   BP Location: Left arm Right arm   Patient Position: Sitting Sitting   BP Method: Large (Automatic) Large (Automatic)   Pulse: 65 65   SpO2: 98% 98%   Weight: (!) 138.2 kg (304 lb 10.8 oz)    Height: 5' 9" (1.753 m)      Physical Exam  Vitals and nursing note reviewed.   Constitutional:       General: He is not in acute distress.     Appearance: Normal appearance. He is not diaphoretic.   HENT:      Head: Normocephalic and atraumatic.      Mouth/Throat:      Mouth: Mucous membranes are moist.   Eyes:      General: No scleral icterus.     Extraocular Movements: Extraocular movements intact.   Neck:      Vascular: No carotid bruit, hepatojugular reflux or JVD.   Cardiovascular:      Rate and Rhythm: Normal rate and regular rhythm.      Pulses: Normal pulses.      Heart sounds: Murmur heard.      Crescendo decrescendo systolic murmur " "is present with a grade of 3/6.      No friction rub.   Pulmonary:      Effort: Pulmonary effort is normal. No respiratory distress.      Breath sounds: Normal breath sounds. No wheezing.   Chest:      Chest wall: No tenderness.   Abdominal:      General: Abdomen is flat. Bowel sounds are normal. There is no distension.      Tenderness: There is no abdominal tenderness.   Musculoskeletal:      Right lower leg: No edema.      Left lower leg: No edema.   Skin:     General: Skin is warm and dry.      Capillary Refill: Capillary refill takes less than 2 seconds.      Findings: No rash.   Neurological:      General: No focal deficit present.      Mental Status: He is alert and oriented to person, place, and time.   Psychiatric:         Mood and Affect: Mood normal.         Thought Content: Thought content normal.       Labs:     Lab Results   Component Value Date     11/28/2022     01/25/2022    K 5.0 11/28/2022    K 4.0 01/25/2022     01/25/2022    CO2 21 (L) 11/28/2022    CO2 26 01/25/2022    BUN 21.0 11/28/2022    BUN 15 01/25/2022    CREATININE 0.99 11/28/2022    CREATININE 1.0 01/25/2022    GLUCOSE 100 (H) 11/28/2022    ANIONGAP 10 01/25/2022     No results found for: "HGBA1C"  No results found for: "BNP", "BNPTRIAGEBLO"    Lab Results   Component Value Date    WBC 0-5 11/29/2022    WBC 5.4 10/28/2022    WBC 4.61 01/25/2022    HGB 10.0 (L) 10/28/2022    HGB 13.3 (L) 01/25/2022    HCT 30.8 (L) 10/28/2022    HCT 41.1 01/25/2022     01/25/2022    GRAN 3.6 01/20/2021    GRAN 67.9 01/20/2021     No results found for: "CHOL", "HDL", "LDLCALC", "TRIG"    Lab Results   Component Value Date     11/28/2022     01/25/2022    K 5.0 11/28/2022    K 4.0 01/25/2022     01/25/2022    CO2 21 (L) 11/28/2022    CO2 26 01/25/2022    BUN 21.0 11/28/2022    BUN 15 01/25/2022    CREATININE 0.99 11/28/2022    CREATININE 1.0 01/25/2022    GLUCOSE 100 (H) 11/28/2022    ANIONGAP 10 01/25/2022     No " "results found for: "HGBA1C"  No results found for: "BNP", "BNPTRIAGEBLO" Lab Results   Component Value Date    WBC 0-5 11/29/2022    WBC 5.4 10/28/2022    WBC 4.61 01/25/2022    HGB 10.0 (L) 10/28/2022    HGB 13.3 (L) 01/25/2022    HCT 30.8 (L) 10/28/2022    HCT 41.1 01/25/2022     01/25/2022    GRAN 3.6 01/20/2021    GRAN 67.9 01/20/2021     No results found for: "CHOL", "HDL", "LDLCALC", "TRIG"         Cardiovascular Imaging and Labs:     Echo:   EF   Date Value Ref Range Status   05/08/2024 65 % Final   01/25/2022 65 % Final       Assessment & Plan:     1. S/P TAVR (transcatheter aortic valve replacement)    2. Hypercholesterolemia    3. Hypertension, unspecified type    4. Nonrheumatic aortic valve stenosis    5. 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 Will start him on Coumadin 5 mg daily and get an INR this Friday. Will set him up with the coumadin clinic. Plan for INR goal 2-3 and assess response to treatment in the next 2-3 months. In the meantime we will repeat a coronary angiogram and a CTA to work him up for Ligia TAVR.     The patient is not in acute heart failure, however, with NYHA class III symptoms. He understands the plan.    Ervin Hernandez is a 68 y.o. male referred by Dr Schulte for evaluation of severe AS (NYHA Class III sx).    The patient has undergone the following TAVR work-up:   ECHO (Date 5/8/24): MARCELA= 0.94 cm2, MG= 50mmHg, Peak Eusebio= 4.4 m/s, EF= 65%.   LHC (Date 2020): needs repeat   STS: 2%   Frailty: 1/4   Iliacs are >9 on R and > 9 on L   LVOT area by CTA is Needs repeat  CT Surgery risk assessment: high risk, per Dr Wills in 2021 due to obesity (BMI 44 now and was 43 then)  Rhythm issues: iRBBB    -LHC +/- PCI, patient is a KRYSTAL candidate  - Anti-platelet Therapy: ASA  - Access: RRA  - Access closure: Vascband   - Catheters: Grisel  - Creatinine/CrCl:   CrCl cannot be calculated (Patient's most recent lab result is older than the " maximum 7 days allowed.).  - Allergies:   - No shellfish / Iodine allergy  - No Latex allergy   - No Aspirin allergy    - No history of HIT  - Pre-Hydration: NS 3cc/kg x 1 hour   - Pre-Op Med: Bendaryl 50mg pO     - All patient's questions were answered.  -The risks, benefits and alternatives of the procedure were explained to the patient.   -The risks of coronary angiography include but are not limited to: bleeding, infection, heart rhythm abnormalities, allergic reactions, kidney injury and potential need for dialysis, stroke and death.   - Should stenting be indicated, the patient has agreed to dual anti-platelet therapy for 1-consecutive year with a drug-eluting stent and a minimum of 1-month with the use of a bare metal stent  - Additionally, pt is aware that non-compliance is likely to result in stent clotting with heart attack, heart failure, and/or death  -The risks of moderate sedation include hypotension, respiratory depression, arrhythmias, bronchospasm, and death.   - Informed consent was obtained and the  patient is agreeable to proceed with the procedure.    Signed:  Hemal Orozco M.D., M.S.  Interventional Cardiology  [facility]

## 2024-05-09 ENCOUNTER — ANTI-COAG VISIT (OUTPATIENT)
Dept: CARDIOLOGY | Facility: CLINIC | Age: 69
End: 2024-05-09
Payer: MEDICARE

## 2024-05-09 ENCOUNTER — PATIENT MESSAGE (OUTPATIENT)
Dept: CARDIOLOGY | Facility: CLINIC | Age: 69
End: 2024-05-09
Payer: MEDICARE

## 2024-05-09 DIAGNOSIS — Z79.01 LONG TERM (CURRENT) USE OF ANTICOAGULANTS: Primary | ICD-10-CM

## 2024-05-09 DIAGNOSIS — I35.0 SEVERE AORTIC STENOSIS: Primary | ICD-10-CM

## 2024-05-09 DIAGNOSIS — Z95.2 S/P TAVR (TRANSCATHETER AORTIC VALVE REPLACEMENT): ICD-10-CM

## 2024-05-09 RX ORDER — SODIUM CHLORIDE 0.9 % (FLUSH) 0.9 %
10 SYRINGE (ML) INJECTION
Status: SHIPPED | OUTPATIENT
Start: 2024-05-09

## 2024-05-09 RX ORDER — SODIUM CHLORIDE 9 MG/ML
INJECTION, SOLUTION INTRAVENOUS CONTINUOUS
Status: CANCELLED | OUTPATIENT
Start: 2024-05-09 | End: 2024-05-09

## 2024-05-09 RX ORDER — DIPHENHYDRAMINE HCL 50 MG
50 CAPSULE ORAL ONCE
Status: CANCELLED | OUTPATIENT
Start: 2024-05-09 | End: 2024-05-09

## 2024-05-09 NOTE — PROGRESS NOTES
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 Per MD, start him on Coumadin 5 mg daily. Plan for INR goal 2-3 and assess response to treatment in the next 2-3 months. In the meantime we will repeat a coronary angiogram and a CTA to work him up for Ligia TAVR. Other PMHx: CAD, HTN, HF, AS, thrombocytopenia.     Please contact pt to enroll/educate. Confirm if started 5/8. If so, will need INR Friday which was MD's plan. INR however is currently scheduled on Monday. If has not started yet, start today & INR as scheduled on Monday    Update: Pt did start on 5/8. He will get INR tomorrow for close follow up. He will be avoiding high vit K foods. He reports dental cleaning on 5/29 which is fine - does not need any special instructions.

## 2024-05-09 NOTE — PROGRESS NOTES
Patient verified Coumadin as 5mg tablet taking 1 tablet (5mg) every evening. He started his medications last night on 5/8. Patient to take Coumadin as prescribed until instructed otherwise and INR due date [facility]/10/24 at [facility].    Patient education completed regarding vitamin K diet, when to contact Coumadin Clinic, and Coumadin must be taken after 5 pm.  Diet plan is to avoid high vitamin K foods. He does not drink alcohol.    He is scheduled for a dental cleaning on 5/29 that he wanted to make us aware of.

## 2024-05-10 ENCOUNTER — LAB VISIT (OUTPATIENT)
Dept: LAB | Facility: OTHER | Age: 69
End: 2024-05-10
Attending: INTERNAL MEDICINE
Payer: MEDICARE

## 2024-05-10 ENCOUNTER — ANTI-COAG VISIT (OUTPATIENT)
Dept: CARDIOLOGY | Facility: CLINIC | Age: 69
End: 2024-05-10
Payer: MEDICARE

## 2024-05-10 DIAGNOSIS — Z95.2 S/P TAVR (TRANSCATHETER AORTIC VALVE REPLACEMENT): ICD-10-CM

## 2024-05-10 DIAGNOSIS — Z79.01 LONG TERM (CURRENT) USE OF ANTICOAGULANTS: ICD-10-CM

## 2024-05-10 DIAGNOSIS — Z95.2 S/P TAVR (TRANSCATHETER AORTIC VALVE REPLACEMENT): Primary | ICD-10-CM

## 2024-05-10 LAB
INR PPP: 1 (ref 0.8–1.2)
PROTHROMBIN TIME: 11.4 SEC (ref 9–12.5)

## 2024-05-10 PROCEDURE — 93793 ANTICOAG MGMT PT WARFARIN: CPT | Mod: S$GLB,,,

## 2024-05-10 PROCEDURE — 36415 COLL VENOUS BLD VENIPUNCTURE: CPT | Performed by: INTERNAL MEDICINE

## 2024-05-10 PROCEDURE — 85610 PROTHROMBIN TIME: CPT | Performed by: INTERNAL MEDICINE

## 2024-05-13 ENCOUNTER — ANTI-COAG VISIT (OUTPATIENT)
Dept: CARDIOLOGY | Facility: CLINIC | Age: 69
End: 2024-05-13
Payer: MEDICARE

## 2024-05-13 ENCOUNTER — LAB VISIT (OUTPATIENT)
Dept: LAB | Facility: OTHER | Age: 69
End: 2024-05-13
Attending: INTERNAL MEDICINE
Payer: MEDICARE

## 2024-05-13 DIAGNOSIS — Z79.01 LONG TERM (CURRENT) USE OF ANTICOAGULANTS: ICD-10-CM

## 2024-05-13 DIAGNOSIS — Z95.2 S/P TAVR (TRANSCATHETER AORTIC VALVE REPLACEMENT): Primary | ICD-10-CM

## 2024-05-13 DIAGNOSIS — Z95.2 S/P TAVR (TRANSCATHETER AORTIC VALVE REPLACEMENT): ICD-10-CM

## 2024-05-13 DIAGNOSIS — I35.0 NONRHEUMATIC AORTIC VALVE STENOSIS: ICD-10-CM

## 2024-05-13 LAB
CREAT SERPL-MCNC: 1.1 MG/DL (ref 0.5–1.4)
EST. GFR  (NO RACE VARIABLE): >60 ML/MIN/1.73 M^2
INR PPP: 2.1 (ref 0.8–1.2)
PROTHROMBIN TIME: 23 SEC (ref 9–12.5)

## 2024-05-13 PROCEDURE — 85610 PROTHROMBIN TIME: CPT | Performed by: INTERNAL MEDICINE

## 2024-05-13 PROCEDURE — 82565 ASSAY OF CREATININE: CPT | Performed by: INTERNAL MEDICINE

## 2024-05-13 PROCEDURE — 93793 ANTICOAG MGMT PT WARFARIN: CPT | Mod: S$GLB,,,

## 2024-05-13 PROCEDURE — 36415 COLL VENOUS BLD VENIPUNCTURE: CPT | Performed by: INTERNAL MEDICINE

## 2024-05-13 NOTE — PROGRESS NOTES
New pt. INR good but now moving quickly. Adjust dose per calendar. Repeat INR Thursday with other appt

## 2024-05-16 ENCOUNTER — HOSPITAL ENCOUNTER (OUTPATIENT)
Dept: RADIOLOGY | Facility: HOSPITAL | Age: 69
Discharge: HOME | End: 2024-05-16
Attending: INTERNAL MEDICINE
Payer: MEDICARE

## 2024-05-16 ENCOUNTER — ANTI-COAG VISIT (OUTPATIENT)
Dept: CARDIOLOGY | Facility: CLINIC | Age: 69
End: 2024-05-16
Payer: MEDICARE

## 2024-05-16 DIAGNOSIS — Z95.2 S/P TAVR (TRANSCATHETER AORTIC VALVE REPLACEMENT): Primary | ICD-10-CM

## 2024-05-16 DIAGNOSIS — I35.0 NONRHEUMATIC AORTIC VALVE STENOSIS: ICD-10-CM

## 2024-05-16 DIAGNOSIS — Z79.01 LONG TERM (CURRENT) USE OF ANTICOAGULANTS: ICD-10-CM

## 2024-05-16 PROCEDURE — 71275 CT ANGIOGRAPHY CHEST: CPT | Mod: 26,,, | Performed by: RADIOLOGY

## 2024-05-16 PROCEDURE — 93793 ANTICOAG MGMT PT WARFARIN: CPT | Mod: S$GLB,,,

## 2024-05-16 PROCEDURE — 25500020 PHARM REV CODE 255: Performed by: INTERNAL MEDICINE

## 2024-05-16 PROCEDURE — 74174 CTA ABD&PLVS W/CONTRAST: CPT | Mod: 26,,, | Performed by: RADIOLOGY

## 2024-05-16 PROCEDURE — 74174 CTA ABD&PLVS W/CONTRAST: CPT | Mod: TC

## 2024-05-16 RX ADMIN — IOHEXOL 120 ML: 350 INJECTION, SOLUTION INTRAVENOUS at 10:05

## 2024-05-22 ENCOUNTER — ANTI-COAG VISIT (OUTPATIENT)
Dept: CARDIOLOGY | Facility: CLINIC | Age: 69
End: 2024-05-22
Payer: MEDICARE

## 2024-05-22 ENCOUNTER — LAB VISIT (OUTPATIENT)
Dept: LAB | Facility: OTHER | Age: 69
End: 2024-05-22
Attending: INTERNAL MEDICINE
Payer: MEDICARE

## 2024-05-22 DIAGNOSIS — Z79.01 LONG TERM (CURRENT) USE OF ANTICOAGULANTS: ICD-10-CM

## 2024-05-22 DIAGNOSIS — Z95.2 S/P TAVR (TRANSCATHETER AORTIC VALVE REPLACEMENT): ICD-10-CM

## 2024-05-22 DIAGNOSIS — Z95.2 S/P TAVR (TRANSCATHETER AORTIC VALVE REPLACEMENT): Primary | ICD-10-CM

## 2024-05-22 LAB
INR PPP: 2.5 (ref 0.8–1.2)
PROTHROMBIN TIME: 26.8 SEC (ref 9–12.5)

## 2024-05-22 PROCEDURE — 85610 PROTHROMBIN TIME: CPT | Performed by: INTERNAL MEDICINE

## 2024-05-22 PROCEDURE — 93793 ANTICOAG MGMT PT WARFARIN: CPT | Mod: S$GLB,,,

## 2024-05-22 PROCEDURE — 36415 COLL VENOUS BLD VENIPUNCTURE: CPT | Performed by: INTERNAL MEDICINE

## 2024-05-22 NOTE — PROGRESS NOTES
Patient called and was given lab result, he verified correct coumadin dose, reports no changes, Patient was given coumadin instructions and next lab date, verbalized understanding but rescheduled 5/29 lab to 5/28 due to having a dental cleaning 5/29

## 2024-05-28 ENCOUNTER — LAB VISIT (OUTPATIENT)
Dept: LAB | Facility: OTHER | Age: 69
End: 2024-05-28
Attending: INTERNAL MEDICINE
Payer: MEDICARE

## 2024-05-28 ENCOUNTER — ANTI-COAG VISIT (OUTPATIENT)
Dept: CARDIOLOGY | Facility: CLINIC | Age: 69
End: 2024-05-28
Payer: MEDICARE

## 2024-05-28 DIAGNOSIS — Z79.01 LONG TERM (CURRENT) USE OF ANTICOAGULANTS: ICD-10-CM

## 2024-05-28 DIAGNOSIS — Z95.2 S/P TAVR (TRANSCATHETER AORTIC VALVE REPLACEMENT): ICD-10-CM

## 2024-05-28 DIAGNOSIS — Z95.2 S/P TAVR (TRANSCATHETER AORTIC VALVE REPLACEMENT): Primary | ICD-10-CM

## 2024-05-28 LAB
INR PPP: 2.5 (ref 0.8–1.2)
PROTHROMBIN TIME: 27.1 SEC (ref 9–12.5)

## 2024-05-28 PROCEDURE — 85610 PROTHROMBIN TIME: CPT | Performed by: INTERNAL MEDICINE

## 2024-05-28 PROCEDURE — 36415 COLL VENOUS BLD VENIPUNCTURE: CPT | Performed by: INTERNAL MEDICINE

## 2024-05-28 PROCEDURE — 93793 ANTICOAG MGMT PT WARFARIN: CPT | Mod: S$GLB,,,

## 2024-06-11 ENCOUNTER — ANTI-COAG VISIT (OUTPATIENT)
Dept: CARDIOLOGY | Facility: CLINIC | Age: 69
End: 2024-06-11
Payer: MEDICARE

## 2024-06-11 ENCOUNTER — LAB VISIT (OUTPATIENT)
Dept: LAB | Facility: OTHER | Age: 69
End: 2024-06-11
Attending: INTERNAL MEDICINE
Payer: MEDICARE

## 2024-06-11 DIAGNOSIS — Z95.2 S/P TAVR (TRANSCATHETER AORTIC VALVE REPLACEMENT): ICD-10-CM

## 2024-06-11 DIAGNOSIS — Z79.01 LONG TERM (CURRENT) USE OF ANTICOAGULANTS: ICD-10-CM

## 2024-06-11 DIAGNOSIS — Z95.2 S/P TAVR (TRANSCATHETER AORTIC VALVE REPLACEMENT): Primary | ICD-10-CM

## 2024-06-11 LAB
INR PPP: 3.1 (ref 0.8–1.2)
PROTHROMBIN TIME: 32.9 SEC (ref 9–12.5)

## 2024-06-11 PROCEDURE — 85610 PROTHROMBIN TIME: CPT | Performed by: INTERNAL MEDICINE

## 2024-06-11 PROCEDURE — 93793 ANTICOAG MGMT PT WARFARIN: CPT | Mod: S$GLB,,,

## 2024-06-11 PROCEDURE — 36415 COLL VENOUS BLD VENIPUNCTURE: CPT | Performed by: INTERNAL MEDICINE

## 2024-06-11 NOTE — PROGRESS NOTES
INR a tad high. Pt has [facility] on 6/17. Found procedure instructions given 5/8 for pt to hold warfarin day of procedure. Since INR is slightly high, will plan for pt to hold 6/16 in prep for procedure. Pt may or may not be admitted depending on results of heart cath. I will follow up for post procedure plans then we will plan for next INR and if any dose changes need to be made.     Update 6/18: per brief op note, coumadin d/c post procedure. Please confirm with pt. We will d/c from clinic once confirmed. Cancel INR next week. Pt can resume normal diet without regard to greens. ... Pt confirmed d/c 6/17. We will sign off.

## 2024-06-17 ENCOUNTER — HOSPITAL ENCOUNTER (OUTPATIENT)
Facility: HOSPITAL | Age: 69
Discharge: HOME | End: 2024-06-17
Attending: INTERNAL MEDICINE | Admitting: INTERNAL MEDICINE
Payer: MEDICARE

## 2024-06-17 VITALS
TEMPERATURE: 98 F | OXYGEN SATURATION: 98 % | SYSTOLIC BLOOD PRESSURE: 159 MMHG | HEART RATE: 60 BPM | RESPIRATION RATE: 20 BRPM | BODY MASS INDEX: 45.03 KG/M2 | WEIGHT: 304 LBS | HEIGHT: 69 IN | DIASTOLIC BLOOD PRESSURE: 68 MMHG

## 2024-06-17 DIAGNOSIS — Z01.818 PRE-OPERATIVE EXAM: ICD-10-CM

## 2024-06-17 DIAGNOSIS — I35.0 SEVERE AORTIC STENOSIS: ICD-10-CM

## 2024-06-17 LAB
ABO + RH BLD: NORMAL
ANION GAP SERPL CALC-SCNC: 9 MMOL/L (ref 8–16)
BLD GP AB SCN CELLS X3 SERPL QL: NORMAL
BUN SERPL-MCNC: 12 MG/DL (ref 8–23)
CALCIUM SERPL-MCNC: 8.3 MG/DL (ref 8.7–10.5)
CATH EF QUANTITATIVE: 65 %
CHLORIDE SERPL-SCNC: 110 MMOL/L (ref 95–110)
CO2 SERPL-SCNC: 24 MMOL/L (ref 23–29)
CREAT SERPL-MCNC: 1 MG/DL (ref 0.5–1.4)
ERYTHROCYTE [DISTWIDTH] IN BLOOD BY AUTOMATED COUNT: 14.5 % (ref 11.5–14.5)
EST. GFR  (NO RACE VARIABLE): >60 ML/MIN/1.73 M^2
GLUCOSE SERPL-MCNC: 111 MG/DL (ref 70–110)
HCT VFR BLD AUTO: 36.6 % (ref 40–54)
HGB BLD-MCNC: 12.6 G/DL (ref 14–18)
MCH RBC QN AUTO: 31.6 PG (ref 27–31)
MCHC RBC AUTO-ENTMCNC: 34.4 G/DL (ref 32–36)
MCV RBC AUTO: 92 FL (ref 82–98)
OHS QRS DURATION: 128 MS
OHS QTC CALCULATION: 446 MS
PLATELET # BLD AUTO: 94 K/UL (ref 150–450)
PMV BLD AUTO: 10.7 FL (ref 9.2–12.9)
POTASSIUM SERPL-SCNC: 4.2 MMOL/L (ref 3.5–5.1)
RBC # BLD AUTO: 3.99 M/UL (ref 4.6–6.2)
SODIUM SERPL-SCNC: 143 MMOL/L (ref 136–145)
SPECIMEN OUTDATE: NORMAL
WBC # BLD AUTO: 4.93 K/UL (ref 3.9–12.7)

## 2024-06-17 PROCEDURE — 86900 BLOOD TYPING SEROLOGIC ABO: CPT | Performed by: INTERNAL MEDICINE

## 2024-06-17 PROCEDURE — 93567 NJX CAR CTH SPRVLV AORTGRPHY: CPT | Performed by: INTERNAL MEDICINE

## 2024-06-17 PROCEDURE — C1894 INTRO/SHEATH, NON-LASER: HCPCS | Performed by: INTERNAL MEDICINE

## 2024-06-17 PROCEDURE — 93010 ELECTROCARDIOGRAM REPORT: CPT | Mod: ,,, | Performed by: INTERNAL MEDICINE

## 2024-06-17 PROCEDURE — C1887 CATHETER, GUIDING: HCPCS | Performed by: INTERNAL MEDICINE

## 2024-06-17 PROCEDURE — 85027 COMPLETE CBC AUTOMATED: CPT | Performed by: INTERNAL MEDICINE

## 2024-06-17 PROCEDURE — 99153 MOD SED SAME PHYS/QHP EA: CPT | Performed by: INTERNAL MEDICINE

## 2024-06-17 PROCEDURE — 86850 RBC ANTIBODY SCREEN: CPT | Performed by: INTERNAL MEDICINE

## 2024-06-17 PROCEDURE — 25500020 PHARM REV CODE 255: Performed by: INTERNAL MEDICINE

## 2024-06-17 PROCEDURE — 93458 L HRT ARTERY/VENTRICLE ANGIO: CPT | Mod: 26,GC,, | Performed by: INTERNAL MEDICINE

## 2024-06-17 PROCEDURE — 99152 MOD SED SAME PHYS/QHP 5/>YRS: CPT | Performed by: INTERNAL MEDICINE

## 2024-06-17 PROCEDURE — C1769 GUIDE WIRE: HCPCS | Performed by: INTERNAL MEDICINE

## 2024-06-17 PROCEDURE — 27201423 OPTIME MED/SURG SUP & DEVICES STERILE SUPPLY: Performed by: INTERNAL MEDICINE

## 2024-06-17 PROCEDURE — 99152 MOD SED SAME PHYS/QHP 5/>YRS: CPT | Mod: ,,, | Performed by: INTERNAL MEDICINE

## 2024-06-17 PROCEDURE — 93567 NJX CAR CTH SPRVLV AORTGRPHY: CPT | Mod: GC,,, | Performed by: INTERNAL MEDICINE

## 2024-06-17 PROCEDURE — 93005 ELECTROCARDIOGRAM TRACING: CPT | Mod: 59

## 2024-06-17 PROCEDURE — 25000003 PHARM REV CODE 250: Performed by: INTERNAL MEDICINE

## 2024-06-17 PROCEDURE — 93458 L HRT ARTERY/VENTRICLE ANGIO: CPT | Performed by: INTERNAL MEDICINE

## 2024-06-17 PROCEDURE — 80048 BASIC METABOLIC PNL TOTAL CA: CPT | Performed by: INTERNAL MEDICINE

## 2024-06-17 PROCEDURE — 36415 COLL VENOUS BLD VENIPUNCTURE: CPT | Performed by: INTERNAL MEDICINE

## 2024-06-17 PROCEDURE — 63600175 PHARM REV CODE 636 W HCPCS: Performed by: INTERNAL MEDICINE

## 2024-06-17 RX ORDER — SODIUM CHLORIDE 9 MG/ML
INJECTION, SOLUTION INTRAVENOUS CONTINUOUS
Status: ACTIVE | OUTPATIENT
Start: 2024-06-17 | End: 2024-06-17

## 2024-06-17 RX ORDER — HEPARIN SODIUM 1000 [USP'U]/ML
INJECTION, SOLUTION INTRAVENOUS; SUBCUTANEOUS
Status: DISCONTINUED | OUTPATIENT
Start: 2024-06-17 | End: 2024-06-17 | Stop reason: HOSPADM

## 2024-06-17 RX ORDER — FENTANYL CITRATE 50 UG/ML
INJECTION, SOLUTION INTRAMUSCULAR; INTRAVENOUS
Status: DISCONTINUED | OUTPATIENT
Start: 2024-06-17 | End: 2024-06-17 | Stop reason: HOSPADM

## 2024-06-17 RX ORDER — ONDANSETRON 8 MG/1
8 TABLET, ORALLY DISINTEGRATING ORAL EVERY 8 HOURS PRN
Status: DISCONTINUED | OUTPATIENT
Start: 2024-06-17 | End: 2024-06-17 | Stop reason: HOSPADM

## 2024-06-17 RX ORDER — ACETAMINOPHEN 325 MG/1
650 TABLET ORAL EVERY 4 HOURS PRN
Status: DISCONTINUED | OUTPATIENT
Start: 2024-06-17 | End: 2024-06-17 | Stop reason: HOSPADM

## 2024-06-17 RX ORDER — MIDAZOLAM HYDROCHLORIDE 1 MG/ML
INJECTION, SOLUTION INTRAMUSCULAR; INTRAVENOUS
Status: DISCONTINUED | OUTPATIENT
Start: 2024-06-17 | End: 2024-06-17 | Stop reason: HOSPADM

## 2024-06-17 RX ORDER — LIDOCAINE HYDROCHLORIDE 20 MG/ML
INJECTION, SOLUTION EPIDURAL; INFILTRATION; INTRACAUDAL; PERINEURAL
Status: DISCONTINUED | OUTPATIENT
Start: 2024-06-17 | End: 2024-06-17 | Stop reason: HOSPADM

## 2024-06-17 RX ORDER — DIPHENHYDRAMINE HCL 50 MG
50 CAPSULE ORAL ONCE
Status: COMPLETED | OUTPATIENT
Start: 2024-06-17 | End: 2024-06-17

## 2024-06-17 RX ADMIN — DIPHENHYDRAMINE HYDROCHLORIDE 50 MG: 50 CAPSULE ORAL at 11:06

## 2024-06-17 RX ADMIN — SODIUM CHLORIDE: 9 INJECTION, SOLUTION INTRAVENOUS at 11:06

## 2024-06-17 NOTE — PLAN OF CARE
Received report from Pretty ritchie RN. Patient s/p LHC via right radial wrist, AAOx3. VSS, no c/o pain or discomfort at this time, resp even and unlabored on RA. Inflated right wrist band is intact. No active bleeding. No hematoma noted. Post procedure protocol reviewed with patient.. Understanding verbalized. No family to call to bedside. Palpable pulses x 4 extremities. Nurse call bell within reach.

## 2024-06-17 NOTE — BRIEF OP NOTE
Brief Operative Note:    : Logan Pham MD     Referring Physician: Logan Pham     All Operators: Surgeon(s):  Vince Lynch MD Subramaniam, Venkat, MD Tafur Soto, Jose D., MD     Preoperative Diagnosis: Severe aortic stenosis [I35.0]     Postop Diagnosis: Severe aortic stenosis [I35.0]    Treatments/Procedures: Procedure(s) (LRB):  Angiogram, Coronary, with Left Heart Cath (N/A)  AORTOGRAM (N/A)    Access: Right radial artery    Findings:Mild coronary artery disease is present.   Severe AS with mild paravalvular leak was noted  LVEDP 24 mmHg    See catheterization report for full details.    Intervention: none    See catheterization report for full details.    Closure device:  Radial band        Plan:  - Post cath protocol   - IVF @ 100 cc/kg/hr x 4 hours  - Bed rest x 2 hours   - Will discontinue coumadin given gradient has not changed while on AC  - patient to be seen by CTS outpatient followed by valve clinic      Estimated Blood loss: 20 cc    Specimens removed: No    Vince Lynch MD  Interventional Cardiology PGY-4  06/17/2024

## 2024-06-17 NOTE — HPI
Ervin Hernandez is a 68 y.o. male who presents for diagnostic angiography in setting of evaluation and treatment of   TAVR valve degeneration leading to AS.    Recently had a colonoscopy and a follow up TTE showed severe AS. Has NYHA class III symptoms. Has been seen by pulmonology who reviewed his recent CT that had been done to follow a nodule in the right middle lobe. No new pulmonary complaints. Lung nodule, pleural base in the right lower lobe stable 1.6 x 1.7  Unchanged from July 2021. Some ground glass nodule changes consistent with past hx of pneumonia.     TAVR work-up:      · ECHO (Date 5/2024): MARCELA= 0.94 cm2, MG= 50mmHg, Peak Eusebio= 4.4 m/s, EF= 65%.   · LHC (Date 10/23/2020 by Dr. Schulte): Non obstructive CAD. WILL REPEAT  · STS: 1.5%   · Frailty: 0/4   · Iliacs are >9.69 on R and > 9.99 on L   · LVOT repeat CT  · Incidental findings on CT: Bilateral calcified and noncalcified pulmonary nodules.  Largest nodule measures 1.5 cm in the medial basal segment of the right lower lobe need follow up with PCP (communicated with his PCP).  · CT Surgery risk assessment: Pending evaluation  · Rhythm issues: High Risk for PPM with NSR and incomplete RBBB.  · PFTs: FEV1 % predicted, DLCO % predicted.  · Comorbidities:Obesity, hypertension and hyperlipidemia.         Has a 29 mm Evolut Pro with ECHO findings suggestive of valve obstruction.      Today patient mentions no angina, heart failure symptoms, claudication or palpitations. He denies syncope, or near syncope.

## 2024-06-17 NOTE — NURSING
Right wrist vasc band deflated per protocol. Cleaned area, dried and applied gauze with tegaderm. IV NS infusing at this time. Pt's richard contactedArmando and will pick patient up.

## 2024-06-17 NOTE — H&P
[facility] - Short Stay Cardiac Unit  Interventional Cardiology  H&P    Patient Name: Ervin Hernandez  MRN: [id number]  Admission Date: 6/17/2024  Code Status: Prior   Attending Provider: Logan Pham MD   Primary Care Physician: Merary Phillip MD  Principal Problem:<principal problem not specified>    Patient information was obtained from patient, past medical records, and ER records.     Subjective:     Chief Complaint:       HPI:  Ervin Hernandez is a 68 y.o. male who presents for diagnostic angiography in setting of evaluation and treatment of   TAVR valve degeneration leading to AS.    Recently had a colonoscopy and a follow up TTE showed severe AS. Has NYHA class III symptoms. Has been seen by pulmonology who reviewed his recent CT that had been done to follow a nodule in the right middle lobe. No new pulmonary complaints. Lung nodule, pleural base in the right lower lobe stable 1.6 x 1.7  Unchanged from July 2021. Some ground glass nodule changes consistent with past hx of pneumonia.     TAVR work-up:      · ECHO (Date 5/2024): MARCELA= 0.94 cm2, MG= 50mmHg, Peak Eusebio= 4.4 m/s, EF= 65%.   · LHC (Date 10/23/2020 by Dr. Schulte): Non obstructive CAD. WILL REPEAT  · STS: 1.5%   · Frailty: 0/4   · Iliacs are >9.69 on R and > 9.99 on L   · LVOT repeat CT  · Incidental findings on CT: Bilateral calcified and noncalcified pulmonary nodules.  Largest nodule measures 1.5 cm in the medial basal segment of the right lower lobe need follow up with PCP (communicated with his PCP).  · CT Surgery risk assessment: Pending evaluation  · Rhythm issues: High Risk for PPM with NSR and incomplete RBBB.  · PFTs: FEV1 % predicted, DLCO % predicted.  · Comorbidities:Obesity, hypertension and hyperlipidemia.         Has a 29 mm Evolut Pro with ECHO findings suggestive of valve obstruction.      Today patient mentions no angina, heart failure symptoms, claudication or palpitations. He denies syncope, or near syncope.         Past Medical History:   Diagnosis Date    Hypercholesteremia     Hypercholesterolemia 9/5/2019    1995: Began statin.    Hypertension     Hypertension 9/5/2019 1995: Diagnosed.    Kidney stones     Mitral valve prolapse 9/5/2019 1995: Diagnosed.    Morbid obesity 9/5/2019 9/5/2019: Weight 127 kg. BMI 41.    OCD (obsessive compulsive disorder)        Past Surgical History:   Procedure Laterality Date    CARDIAC VALVE SURGERY      CATHETERIZATION OF BOTH LEFT AND RIGHT HEART N/A 10/23/2020    Procedure: CATHETERIZATION, HEART, BOTH LEFT AND RIGHT;  Surgeon: Dusty Schulte MD;  Location: [facility] CATH LAB;  Service: Cardiology;  Laterality: N/A;    COLONOSCOPY      COLONOSCOPY N/A 5/3/2024    Procedure: COLONOSCOPY;  Surgeon: Lj Tamayo MD;  Location: [facility] ENDO;  Service: Endoscopy;  Laterality: N/A;    EYE SURGERY Bilateral     cataract  2914 & 2019    tear duct surgery to the left eye - 1973      TRANSCATHETER AORTIC VALVE REPLACEMENT (TAVR) N/A 1/19/2021    Procedure: REPLACEMENT, AORTIC VALVE, TRANSCATHETER (TAVR);  Surgeon: Luis F Plaza MD;  Location: [facility] CATH LAB;  Service: Cardiology;  Laterality: N/A;       Review of patient's allergies indicates:   Allergen Reactions    Sulfa (sulfonamide antibiotics) Other (See Comments)     bleeding       Facility-Administered Medications Prior to Admission   Medication    sodium chloride 0.9% flush 10 mL     PTA Medications   Medication Sig    ascorbic acid, vitamin C, (VITAMIN C) 1000 MG tablet Take 1,000 mg by mouth once daily.    aspirin 81 MG Chew Take 81 mg by mouth once daily.    atenoloL (TENORMIN) 50 MG tablet TAKE 1 TABLET BY MOUTH EVERY DAY    atorvastatin (LIPITOR) 40 MG tablet Take 40 mg by mouth every evening.     chromium-brindal berry (GARCINIA CAMBOGIA) 200-500 mcg-mg Tab Take 1 tablet by mouth Daily. Actually 900 mg    co-enzyme Q-10 30 mg capsule Take 100 mg by mouth once daily.     cyanocobalamin 500 MCG tablet 1 tablet  Orally Once a day    fluvoxaMINE (LUVOX) 100 MG tablet Take 100 mg by mouth every evening.    folic acid/multivit-min/lutein (CENTRUM SILVER ORAL) Take by mouth.    mv-min-folic-K1-lycopen-lutein (CENTRUM SILVER ULTRA MEN'S) 257--300 mcg Tab Centrum Silver Ultra Mens    QUEtiapine (SEROQUEL) 50 MG tablet Take 50 mg by mouth 2 (two) times daily.     tamsulosin (FLOMAX) 0.4 mg Cap Take 0.4 mg by mouth once daily.    telmisartan (MICARDIS) 80 MG Tab TAKE 1 TABLET BY MOUTH EVERY DAY    vitamin D (VITAMIN D3) 1000 units Tab 1 tablet Orally Once a day    warfarin (COUMADIN) 5 MG tablet Take 1 tablet (5 mg total) by mouth Daily.    zolpidem (AMBIEN) 10 mg Tab Take 10 mg by mouth nightly as needed (1/2 as needed for sleep).    furosemide (LASIX) 20 MG tablet Take AS NEEDED, daily for 3 lb weight gain overnight or 5 lbs in 5 days.     Family History       Problem Relation (Age of Onset)    Heart disease Father, Brother    No Known Problems Mother, Sister, Maternal Aunt, Maternal Uncle, Paternal Aunt, Paternal Uncle, Maternal Grandmother, Maternal Grandfather, Paternal Grandmother, Paternal Grandfather          Tobacco Use    Smoking status: Never    Smokeless tobacco: Never   Substance and Sexual Activity    Alcohol use: Not Currently     Comment: 3 beers occ    Drug use: No    Sexual activity: Not on file     Review of Systems   Constitutional: Negative for malaise/fatigue and weight gain.   Cardiovascular:  Positive for dyspnea on exertion. Negative for chest pain, claudication, cyanosis, irregular heartbeat, near-syncope, orthopnea and syncope.   Respiratory:  Negative for cough.    All other systems reviewed and are negative.    Objective:     Vital Signs (Most Recent):  Temp: 98.1 °F (36.7 °C) (06/17/24 1032)  Pulse: 61 (06/17/24 1032)  Resp: 20 (06/17/24 1032)  BP: (!) 148/72 (06/17/24 1033)  SpO2: (!) 94 % (06/17/24 1032) Vital Signs (24h Range):  Temp:  [98.1 °F (36.7 °C)] 98.1 °F (36.7 °C)  Pulse:  [61]  61  Resp:  [20] 20  SpO2:  [94 %] 94 %  BP: (141-148)/(65-72) 148/72     Weight: (!) 137.9 kg (304 lb)  Body mass index is 44.89 kg/m².    SpO2: (!) 94 %       No intake or output data in the 24 hours ending 06/17/24 1152    Lines/Drains/Airways       Peripheral Intravenous Line  Duration                  Peripheral IV - Single Lumen 06/17/24 1111 20 G Left;Posterior Hand <1 day                     Physical Exam  Vitals and nursing note reviewed.   Constitutional:       General: He is not in acute distress.     Appearance: He is obese. He is not ill-appearing.   HENT:      Head: Normocephalic and atraumatic.   Eyes:      General: No scleral icterus.     Conjunctiva/sclera: Conjunctivae normal.   Cardiovascular:      Rate and Rhythm: Normal rate and regular rhythm.      Chest Wall: PMI is not displaced.      Pulses: Normal pulses and intact distal pulses.      Heart sounds: Normal heart sounds, S1 normal and S2 normal.   Pulmonary:      Effort: Pulmonary effort is normal.   Abdominal:      General: There is no distension.      Tenderness: There is no abdominal tenderness.   Musculoskeletal:         General: No swelling.      Right lower leg: No edema.      Left lower leg: No edema.   Skin:     General: Skin is warm.   Neurological:      Mental Status: He is alert.   Psychiatric:         Mood and Affect: Mood normal.            Significant Labs: All pertinent lab results from the last 24 hours have been reviewed.    Significant Imaging: Echocardiogram: Transthoracic echo (TTE) complete (Cupid Only):   Results for orders placed or performed during the hospital encounter of 05/08/24   Echo   Result Value Ref Range    RA Width 4.25 cm    LA volume (mod) 91.11 cm3    Left Atrium Major Axis 5.68 cm    Left Atrium Minor Axis 5.40 cm    RA Major Axis 4.97 cm    LV Diastolic Volume 185.31 mL    LV Systolic Volume 74.23 mL    MV Peak A Eusebio 0.99 m/s    MV stenosis pressure 1/2 time 113.21 ms    MV Peak E Eusebio 0.79 m/s    Ao VTI  94.50 cm    Ao peak saul 4.4 m/s    LVOT peak VTI 23.30 cm    LVOT peak saul 1.00 m/s    LVOT diameter 2.2 cm    E wave deceleration time 390.36 msec    AV mean gradient 50 mmHg    TAPSE 1.91 cm    RVDD 4.66 cm    LA size 5.06 cm    Ascending aorta 3.04 cm    STJ 2.04 cm    Sinus 3.27 cm    LVIDs 4.10 (A) 2.1 - 4.0 cm    Posterior Wall 1.1 0.6 - 1.1 cm    IVS 1.10 0.6 - 1.1 cm    LVIDd 5.9 3.5 - 6.0 cm    TDI LATERAL 0.07 m/s    LA WIDTH 4.40 cm    TDI SEPTAL 0.07 m/s    LV LATERAL E/E' RATIO 11.29 m/s    LV SEPTAL E/E' RATIO 11.29 m/s    RV/LV Ratio 0.79 cm    FS 31 28 - 44 %    LA volume 104.77 cm3    LV mass 271.88 g    Left Ventricle Relative Wall Thickness 0.37 cm    AV valve area 0.94 cm²    AV Velocity Ratio 0.23     AV index (prosthetic) 0.25     MV valve area p 1/2 method 1.94 cm2    E/A ratio 0.80     Mean e' 0.07 m/s    LVOT area 3.8 cm2    LVOT stroke volume 88.53 cm3    AV peak gradient 77 mmHg    E/E' ratio 11.29 m/s    MARCELA by Velocity Ratio 0.86 cm²    BSA 2.6 m2    LV Systolic Volume Index 30.1 mL/m2    LV Diastolic Volume Index 75.02 mL/m2    LV Mass Index 110 g/m2    LA Volume Index 42.4 mL/m2    LA Volume Index (Mod) 36.9 mL/m2    ZLVIDS -4.96     ZLVIDD -7.82     Est. RA pres 3 mmHg    EF 65 %    Narrative      Left Ventricle: The left ventricle is mildly dilated. Ventricular mass   is normal. Normal wall thickness. Normal wall motion. There is normal   systolic function. Ejection fraction by visual approximation is 65%.    Right Ventricle: Normal right ventricular cavity size. Wall thickness   is normal. Systolic function is normal.    Left Atrium: Left atrium is mildly dilated.    Aortic Valve: There is a transcatheter valve replacement in the aortic   position. It is reported to be a 29 mm Medtronic Evolut Pro valve.   Elevated prosthetic gradient. Aortic valve area by VTI is 0.94 cm². Aortic   valve peak velocity is 4.4 m/s. Mean gradient is 50 mmHg. The   dimensionless index is 0.25.  Acceleration time is ~100 msec. Findings are   suggestive of valvular obstruction. There is trace aortic regurgitation.    IVC/SVC: Normal venous pressure at 3 mmHg.    Pericardium: There is a small posterior effusion.       Assessment and Plan:     --LHC +/- PCI, patient is a KRYSTAL candidate  - Anti-platelet Therapy: ASA   - Access: Right radial  - Catheters: Zack  - Creatinine/CrCl: 1.0  - Allergies: No shellfish / Iodine allergy  - Pre-Hydration: NS  - Pre-Op Med: Bendaryl 50mg pO   - All patient's questions were answered.  -The risks, benefits and alternatives of the procedure were explained to the patient.   -The risks of coronary angiography include but are not limited to: bleeding, infection, heart rhythm abnormalities, allergic reactions, kidney injury and potential need for dialysis, stroke and death.   - Should stenting be indicated, the patient has agreed to dual anti-platelet therapy for 1-consecutive year with a drug-eluting stent and a minimum of 1-month with the use of a bare metal stent  - Additionally, pt is aware that non-compliance is likely to result in stent clotting with heart attack, heart failure, and/or death  -The risks of moderate sedation include hypotension, respiratory depression, arrhythmias, bronchospasm, and death.   - Informed consent was obtained and the  patient is agreeable to proceed with the procedure.        VTE Risk Mitigation (From admission, onward)      None              Vince Lynch MD  Interventional Cardiology   [facility] - Short Stay Cardiac Unit

## 2024-06-17 NOTE — Clinical Note
left ventricle. Hemodynamics were performed.  An angiography was performed of the LV. The angiography was performed via power injection. The injected amount was 30 mL contrast at 10 mL/s. The PSI from the power injection was 1000.

## 2024-06-17 NOTE — PLAN OF CARE
Patient arrived to room. PIV placed, labs sent. Admit assessment completed. Plan of care discussed with patient. Nurse call bell within reach. Will monitor

## 2024-06-17 NOTE — SUBJECTIVE & OBJECTIVE
Past Medical History:   Diagnosis Date    Hypercholesteremia     Hypercholesterolemia 9/5/2019 1995: Began statin.    Hypertension     Hypertension 9/5/2019 1995: Diagnosed.    Kidney stones     Mitral valve prolapse 9/5/2019 1995: Diagnosed.    Morbid obesity 9/5/2019 9/5/2019: Weight 127 kg. BMI 41.    OCD (obsessive compulsive disorder)        Past Surgical History:   Procedure Laterality Date    CARDIAC VALVE SURGERY      CATHETERIZATION OF BOTH LEFT AND RIGHT HEART N/A 10/23/2020    Procedure: CATHETERIZATION, HEART, BOTH LEFT AND RIGHT;  Surgeon: Dusty Schulte MD;  Location: [facility] CATH LAB;  Service: Cardiology;  Laterality: N/A;    COLONOSCOPY      COLONOSCOPY N/A 5/3/2024    Procedure: COLONOSCOPY;  Surgeon: Lj Tamayo MD;  Location: [facility] ENDO;  Service: Endoscopy;  Laterality: N/A;    EYE SURGERY Bilateral     cataract  2914 & 2019    tear duct surgery to the left eye - 1973      TRANSCATHETER AORTIC VALVE REPLACEMENT (TAVR) N/A 1/19/2021    Procedure: REPLACEMENT, AORTIC VALVE, TRANSCATHETER (TAVR);  Surgeon: Luis F Plaza MD;  Location: [facility] CATH LAB;  Service: Cardiology;  Laterality: N/A;       Review of patient's allergies indicates:   Allergen Reactions    Sulfa (sulfonamide antibiotics) Other (See Comments)     bleeding       Facility-Administered Medications Prior to Admission   Medication    sodium chloride 0.9% flush 10 mL     PTA Medications   Medication Sig    ascorbic acid, vitamin C, (VITAMIN C) 1000 MG tablet Take 1,000 mg by mouth once daily.    aspirin 81 MG Chew Take 81 mg by mouth once daily.    atenoloL (TENORMIN) 50 MG tablet TAKE 1 TABLET BY MOUTH EVERY DAY    atorvastatin (LIPITOR) 40 MG tablet Take 40 mg by mouth every evening.     chromium-brindal berry (GARCINIA CAMBOGIA) 200-500 mcg-mg Tab Take 1 tablet by mouth Daily. Actually 900 mg    co-enzyme Q-10 30 mg capsule Take 100 mg by mouth once daily.     cyanocobalamin 500 MCG tablet 1 tablet Orally Once  a day    fluvoxaMINE (LUVOX) 100 MG tablet Take 100 mg by mouth every evening.    folic acid/multivit-min/lutein (CENTRUM SILVER ORAL) Take by mouth.    mv-min-folic-K1-lycopen-lutein (CENTRUM SILVER ULTRA MEN'S) 296--300 mcg Tab Centrum Silver Ultra Mens    QUEtiapine (SEROQUEL) 50 MG tablet Take 50 mg by mouth 2 (two) times daily.     tamsulosin (FLOMAX) 0.4 mg Cap Take 0.4 mg by mouth once daily.    telmisartan (MICARDIS) 80 MG Tab TAKE 1 TABLET BY MOUTH EVERY DAY    vitamin D (VITAMIN D3) 1000 units Tab 1 tablet Orally Once a day    warfarin (COUMADIN) 5 MG tablet Take 1 tablet (5 mg total) by mouth Daily.    zolpidem (AMBIEN) 10 mg Tab Take 10 mg by mouth nightly as needed (1/2 as needed for sleep).    furosemide (LASIX) 20 MG tablet Take AS NEEDED, daily for 3 lb weight gain overnight or 5 lbs in 5 days.     Family History       Problem Relation (Age of Onset)    Heart disease Father, Brother    No Known Problems Mother, Sister, Maternal Aunt, Maternal Uncle, Paternal Aunt, Paternal Uncle, Maternal Grandmother, Maternal Grandfather, Paternal Grandmother, Paternal Grandfather          Tobacco Use    Smoking status: Never    Smokeless tobacco: Never   Substance and Sexual Activity    Alcohol use: Not Currently     Comment: 3 beers occ    Drug use: No    Sexual activity: Not on file     Review of Systems   Constitutional: Negative for malaise/fatigue and weight gain.   Cardiovascular:  Positive for dyspnea on exertion. Negative for chest pain, claudication, cyanosis, irregular heartbeat, near-syncope, orthopnea and syncope.   Respiratory:  Negative for cough.    All other systems reviewed and are negative.    Objective:     Vital Signs (Most Recent):  Temp: 98.1 °F (36.7 °C) (06/17/24 1032)  Pulse: 61 (06/17/24 1032)  Resp: 20 (06/17/24 1032)  BP: (!) 148/72 (06/17/24 1033)  SpO2: (!) 94 % (06/17/24 1032) Vital Signs (24h Range):  Temp:  [98.1 °F (36.7 °C)] 98.1 °F (36.7 °C)  Pulse:  [61] 61  Resp:  [20]  20  SpO2:  [94 %] 94 %  BP: (141-148)/(65-72) 148/72     Weight: (!) 137.9 kg (304 lb)  Body mass index is 44.89 kg/m².    SpO2: (!) 94 %       No intake or output data in the 24 hours ending 06/17/24 1152    Lines/Drains/Airways       Peripheral Intravenous Line  Duration                  Peripheral IV - Single Lumen 06/17/24 1111 20 G Left;Posterior Hand <1 day                     Physical Exam  Vitals and nursing note reviewed.   Constitutional:       General: He is not in acute distress.     Appearance: He is obese. He is not ill-appearing.   HENT:      Head: Normocephalic and atraumatic.   Eyes:      General: No scleral icterus.     Conjunctiva/sclera: Conjunctivae normal.   Cardiovascular:      Rate and Rhythm: Normal rate and regular rhythm.      Chest Wall: PMI is not displaced.      Pulses: Normal pulses and intact distal pulses.      Heart sounds: Normal heart sounds, S1 normal and S2 normal.   Pulmonary:      Effort: Pulmonary effort is normal.   Abdominal:      General: There is no distension.      Tenderness: There is no abdominal tenderness.   Musculoskeletal:         General: No swelling.      Right lower leg: No edema.      Left lower leg: No edema.   Skin:     General: Skin is warm.   Neurological:      Mental Status: He is alert.   Psychiatric:         Mood and Affect: Mood normal.            Significant Labs: All pertinent lab results from the last 24 hours have been reviewed.    Significant Imaging: Echocardiogram: Transthoracic echo (TTE) complete (Cupid Only):   Results for orders placed or performed during the hospital encounter of 05/08/24   Echo   Result Value Ref Range    RA Width 4.25 cm    LA volume (mod) 91.11 cm3    Left Atrium Major Axis 5.68 cm    Left Atrium Minor Axis 5.40 cm    RA Major Axis 4.97 cm    LV Diastolic Volume 185.31 mL    LV Systolic Volume 74.23 mL    MV Peak A Eusebio 0.99 m/s    MV stenosis pressure 1/2 time 113.21 ms    MV Peak E Eusebio 0.79 m/s    Ao VTI 94.50 cm    Ao  peak saul 4.4 m/s    LVOT peak VTI 23.30 cm    LVOT peak saul 1.00 m/s    LVOT diameter 2.2 cm    E wave deceleration time 390.36 msec    AV mean gradient 50 mmHg    TAPSE 1.91 cm    RVDD 4.66 cm    LA size 5.06 cm    Ascending aorta 3.04 cm    STJ 2.04 cm    Sinus 3.27 cm    LVIDs 4.10 (A) 2.1 - 4.0 cm    Posterior Wall 1.1 0.6 - 1.1 cm    IVS 1.10 0.6 - 1.1 cm    LVIDd 5.9 3.5 - 6.0 cm    TDI LATERAL 0.07 m/s    LA WIDTH 4.40 cm    TDI SEPTAL 0.07 m/s    LV LATERAL E/E' RATIO 11.29 m/s    LV SEPTAL E/E' RATIO 11.29 m/s    RV/LV Ratio 0.79 cm    FS 31 28 - 44 %    LA volume 104.77 cm3    LV mass 271.88 g    Left Ventricle Relative Wall Thickness 0.37 cm    AV valve area 0.94 cm²    AV Velocity Ratio 0.23     AV index (prosthetic) 0.25     MV valve area p 1/2 method 1.94 cm2    E/A ratio 0.80     Mean e' 0.07 m/s    LVOT area 3.8 cm2    LVOT stroke volume 88.53 cm3    AV peak gradient 77 mmHg    E/E' ratio 11.29 m/s    MARCELA by Velocity Ratio 0.86 cm²    BSA 2.6 m2    LV Systolic Volume Index 30.1 mL/m2    LV Diastolic Volume Index 75.02 mL/m2    LV Mass Index 110 g/m2    LA Volume Index 42.4 mL/m2    LA Volume Index (Mod) 36.9 mL/m2    ZLVIDS -4.96     ZLVIDD -7.82     Est. RA pres 3 mmHg    EF 65 %    Narrative      Left Ventricle: The left ventricle is mildly dilated. Ventricular mass   is normal. Normal wall thickness. Normal wall motion. There is normal   systolic function. Ejection fraction by visual approximation is 65%.    Right Ventricle: Normal right ventricular cavity size. Wall thickness   is normal. Systolic function is normal.    Left Atrium: Left atrium is mildly dilated.    Aortic Valve: There is a transcatheter valve replacement in the aortic   position. It is reported to be a 29 mm Medtronic Evolut Pro valve.   Elevated prosthetic gradient. Aortic valve area by VTI is 0.94 cm². Aortic   valve peak velocity is 4.4 m/s. Mean gradient is 50 mmHg. The   dimensionless index is 0.25. Acceleration time is  ~100 msec. Findings are   suggestive of valvular obstruction. There is trace aortic regurgitation.    IVC/SVC: Normal venous pressure at 3 mmHg.    Pericardium: There is a small posterior effusion.

## 2024-06-18 DIAGNOSIS — Z95.2 S/P TAVR (TRANSCATHETER AORTIC VALVE REPLACEMENT): Primary | ICD-10-CM

## 2024-06-21 DIAGNOSIS — I35.0 SEVERE AORTIC STENOSIS: Primary | ICD-10-CM

## 2024-06-21 RX ORDER — SODIUM CHLORIDE 9 MG/ML
INJECTION, SOLUTION INTRAVENOUS CONTINUOUS
OUTPATIENT
Start: 2024-06-21 | End: 2024-06-21

## 2024-06-21 RX ORDER — DIPHENHYDRAMINE HCL 50 MG
50 CAPSULE ORAL ONCE
OUTPATIENT
Start: 2024-06-21 | End: 2024-06-21

## 2024-06-21 RX ORDER — SODIUM CHLORIDE 0.9 % (FLUSH) 0.9 %
10 SYRINGE (ML) INJECTION
Status: SHIPPED | OUTPATIENT
Start: 2024-06-21

## 2024-07-29 ENCOUNTER — TELEPHONE (OUTPATIENT)
Dept: CARDIOTHORACIC SURGERY | Facility: CLINIC | Age: 69
End: 2024-07-29
Payer: MEDICARE

## 2024-07-29 NOTE — TELEPHONE ENCOUNTER
Contacted pt to schedule apt with CT dept.  Pt agreed to see Dr. Wills  Sept 9th for 11 am Monday.  Pt deny to sent apt letter to his home address .  Apt confirmed with time and date.

## 2024-08-01 ENCOUNTER — TELEPHONE (OUTPATIENT)
Dept: CARDIOLOGY | Facility: CLINIC | Age: 69
End: 2024-08-01
Payer: MEDICARE

## 2024-08-01 ENCOUNTER — EDUCATION (OUTPATIENT)
Dept: CARDIOLOGY | Facility: CLINIC | Age: 69
End: 2024-08-01
Payer: MEDICARE

## 2024-08-01 NOTE — PROGRESS NOTES
Transcatheter Valve Replacement (TAVR)    You have been scheduled for your valve replacement on Tuesday, September 11, 2024.     Please report to the Cardiology Waiting Area on the Third floor of the hospital and check in at 10 AM.   You will then be taken to the SSCU (Short Stay Cardiac Unit) and prepared for your procedure. Please be aware that this is not the time of your procedure but the time you are to arrive.     Preperations for your procedure:  Shower with Dial soap the night before and the morning of your procedure.  Nothing to eat or drink after MIDNIGHT the night before.                                            You may take your regular morning medications with as much water or clear liquid as necessary.   Bring your cane and/or walker with you to the hospital.  Bring CPAP/BiPAP, or oxygen if you are on oxygen at home.  Call the office for any signs of infection ( fever, cough, pneumonia, urinary tract, etc.) We cannot implant a device in an infected patient.    Medications:  You may take your regular scheduled medications the morning of your procedure with as much water as necessary.  If you are diabetic and on Metformin (Glucophage), do not take it the day before, the day of, and 2 days after your procedure.  Hold COUMADIN DAY OF PROCEDURE.  If you take a weekly GLP-1 Inhibitor (Ozempic, Semaglutide, etc) You must be off that medication for one full week prior to your procedure.  Anesthesia will cancel your procedure if you do not hold this medication for a minimum of 7 days prior to TAVR.       How long will the procedure take?  The procedure takes approximately three to four hours.  After the procedure, you will go to an ICU or the Cardiac Step Down Unit.  The decision will be made by the valve team.  You will be monitored closely for the next several hours and remain overnight.       When can I go home?  Your length of stay in the hospital, will be determined by your physician.  Be prepared to  stay 1-3 days.  You will be discharged when your physician thinks it is safe for you to go home.  You must have someone to drive you home when you are discharged.      Follow Up After Valve Replacement:  You will be scheduled for follow up in one month and one year with an echo, lab work, and a clinic visit.    If you are in a research trial, your follow up will be slightly different and according to the trial requirements.  You can follow up with your regular cardiologist regarding any other heart issues.  DO NOT SCHEDULE ANY ELECTIVE PROCEDURES FOR 6 MONTHS AFTER TAVR.  THIS INCLUDES DENTAL WORK, COLONOSCOPY,ETC.         Please contact our office at [phone] for any questions.  Nilsa Cardenas RN          THE ABOVE INSTRUCTIONS WERE GIVEN TO THE PATIENT VERBALLY AND THEY VERBALIZED UNDERSTANDING.  THEY DO NOT REQUIRE ANY SPECIAL NEEDS AND DO NOT HAVE ANY LEARNING BARRIERS.          Directions for Reporting to Cardiology Waiting Area in the Hospital  If you park in the Parking Garage:  Take elevators to the1st floor of the parking garage.  Continue past the gift shop, coffee shop, and piano.  Take a right and go to the gold elevators. (Elevator B)  Take the elevator to the 3rd floor.  Follow the arrow on the sign on the wall that says Cath Lab Registration/EP Lab Registration.  Follow the long hallway all the way around until you come to a big open area.  This is the registration area.  Check in at Reception Desk.    OR    If family is dropping you off:  Have them drop you off at the front of the Hospital under the green overhang.  Enter through the doors and take a right.  Take the E elevators to the 3rd floor Cardiology Waiting Area.  Check in at the Reception Desk in the waiting room.

## 2024-08-01 NOTE — TELEPHONE ENCOUNTER
Spoke to patient and confirmed appts on 9/9      ----- Message from Domi Almodovar sent at 8/1/2024 12:16 PM CDT -----  Contact: [phone]  Patient stated that he would like to schedule an appointment to see Dr. Romo. Please return his call.

## 2024-08-02 ENCOUNTER — EDUCATION (OUTPATIENT)
Dept: CARDIOLOGY | Facility: CLINIC | Age: 69
End: 2024-08-02
Payer: MEDICARE

## 2024-08-02 DIAGNOSIS — I35.0 NONRHEUMATIC AORTIC VALVE STENOSIS: Primary | ICD-10-CM

## 2024-08-02 NOTE — PROGRESS NOTES
TAVR DISCHARGE INSTRUCTIONS      1. General Post Op Instructions:  -   No lifting greater than 5 pounds for 5 days  -   No driving or operating heavy machinery for 5 days  -   You may shower as soon as you get home but no bathing or submerging in water       (lakes, pools, tub etc) for 1 week.                -   You may remove the dressing and replace with a band-aid.      -   Keep incision dry and clean.  No lotions, oils, or creams.  You may change the band-aid daily                   until a scab has formed over              -   You may feel a small lump in your groin area due to a closure device used after the procedure.                     The site may be black and blue or swollen and pink for a few days. If the site enlarges,                    becomes painful and/or starts to bleed,please call.               -    You may return to your regular activities as tolerated.        If you develop any fever, urinary tract infection, or any other signs of infection, please call our office immediately.    2.  Preventing infection on Your heart Valve:  -   DO NOT have any dental work, surgery, or any other elective procedures for 6 months.  -   Provide a copy of this card to your Dentist or Gastroenterologist to keep in your chart.  -  You DO need to take antibiotics prior to any routine dental cleaning, GI procedure (colonoscopy, endoscopy), (cystoscopy), or respiratory procedures (bronchoscopy).                -  You DO need antibiotics if you are having a procedure that requires cutting any infected area.              -  Your Dentist or Gastroenterologist will prescribe these for you prior to your appointment.        3.  Managing your heart Failure:  -    Weigh yourself daily and keep track of your weight  -    If you are on Lasix, continue to take it as prescribed.  -    If you gain 3 pounds overnight or 5 pounds over 5 days, double Lasix for 3 days or begin taking  "Lasix once a day for 3 days                   4.  Follow Up:  -   TAVR follow up protocol requires you to return for a one month and a one-year visit. We will schedule an echo, blood work  and an appointment to see a    member of our team.      -   Please continue to see your regular Cardiologist for all other issues.  -   You will receive an ID card from the  of your valve in 4-6 weeks.  Make a copy of the card, and keep it with you at all times.    5.  Discharge:  - If you have any questions or concerns after discharge, please do not hesitate to call our office and speak with a nurse at [phone]              - If you have any problems or concerns related to a Pacemaker or Event monitor please call the Arrhythmia department at [phone].             -  Please address any other concerns with your primary Cardiologist.                   -  We answer messages sent via the "[employer]" portal Monday - Friday 8am - 5pm.               -  Please do not send emergency messages through the portal.                  After hours and weekends, please call [phone] to speak to a Registered Nurse.                            "

## 2024-09-05 ENCOUNTER — TELEPHONE (OUTPATIENT)
Dept: CARDIOTHORACIC SURGERY | Facility: CLINIC | Age: 69
End: 2024-09-05
Payer: MEDICARE

## 2024-09-05 DIAGNOSIS — I35.0 NONRHEUMATIC AORTIC VALVE STENOSIS: Primary | ICD-10-CM

## 2024-09-05 NOTE — TELEPHONE ENCOUNTER
Spoke with pt to  confirmed appointment with Dr. Newell on Monday 09/09 . Pt confirmed apt time , date and location.  Pt don't have any further question at this time.

## 2024-09-08 ENCOUNTER — ANESTHESIA EVENT (OUTPATIENT)
Dept: MEDSURG UNIT | Facility: HOSPITAL | Age: 69
End: 2024-09-08
Payer: MEDICARE

## 2024-09-08 NOTE — PROGRESS NOTES
Subjective:      Patient ID: Ervin Hernandez is a 69 y.o. male.    Chief Complaint: No chief complaint on file.      HPI:  Ervin Hernandez is a 69 y.o. male who presents for surgical evaluation of AS of TAVR valve. Recently had a colonoscopy and a follow up TTE showed severe AS. Has NYHA class III symptoms. Has been seen by pulmonology who reviewed his recent CT that had been done to follow a nodule in the right middle lobe. No new pulmonary complaints. Lung nodule, pleural base in the right lower lobe stable 1.6 x 1.7  Unchanged from July 2021. Some ground glass nodule changes consistent with past hx of pneumonia. Patient has a humeral fracture for which he is seeing ortho. Patient declined formal PT and both surgical and non surgical treatment plans per their note. Is doing exercises at home. Patient was deemed high risk for surgery in 2021 due to obesity and underwent TAVR placement. He reports losing 13 pounds. His BMI today is 44. Can walk about 2 blocks before feeling short of breath. Uses a cane to ambulate.       TAVR work-up:      ·           ECHO (Date 5/2024): MARCELA= 0.94 cm2, MG= 50mmHg, Peak Eusebio= 4.4 m/s, EF= 65%.   ·LHC (Date 10/23/2020 by Dr. Schulte): Non obstructive CAD. WILL REPEAT  ·           STS: 1.5%   ·           Frailty: 0/4   ·           Iliacs are >9.69 on R and > 9.99 on L   ·LVOT repeat CT  ·           Incidental findings on CT: Bilateral calcified and noncalcified pulmonary nodules.  Largest nodule measures 1.5 cm in the medial basal segment of the right lower lobe need follow up with PCP (communicated with his PCP).  ·CT Surgery risk assessment: Pending evaluation  ·Rhythm issues: High Risk for PPM with NSR and incomplete RBBB.  ·PFTs: FEV1 % predicted, DLCO % predicted.  ·           Comorbidities:Obesity, hypertension and hyperlipidemia.         Has a 29 mm Evolut Pro with ECHO findings suggestive of valve obstruction.          Family and social history reviewed    Review of  patient's allergies indicates:   Allergen Reactions    Sulfa (sulfonamide antibiotics) Other (See Comments)     bleeding     Past Medical History:   Diagnosis Date    Hypercholesteremia     Hypercholesterolemia 9/5/2019 1995: Began statin.    Hypertension     Hypertension 9/5/2019 1995: Diagnosed.    Kidney stones     Mitral valve prolapse 9/5/2019 1995: Diagnosed.    Morbid obesity 9/5/2019 9/5/2019: Weight 127 kg. BMI 41.    OCD (obsessive compulsive disorder)      Past Surgical History:   Procedure Laterality Date    ANGIOGRAM, CORONARY, WITH LEFT HEART CATHETERIZATION N/A 6/17/2024    Procedure: Angiogram, Coronary, with Left Heart Cath;  Surgeon: Logan Pham MD;  Location: [facility] CATH LAB;  Service: Cardiology;  Laterality: N/A;    AORTOGRAPHY N/A 6/17/2024    Procedure: AORTOGRAM;  Surgeon: Logan Pham MD;  Location: [facility] CATH LAB;  Service: Cardiology;  Laterality: N/A;    CARDIAC VALVE SURGERY      CATHETERIZATION OF BOTH LEFT AND RIGHT HEART N/A 10/23/2020    Procedure: CATHETERIZATION, HEART, BOTH LEFT AND RIGHT;  Surgeon: Dusty Schulte MD;  Location: [facility] CATH LAB;  Service: Cardiology;  Laterality: N/A;    COLONOSCOPY      COLONOSCOPY N/A 5/3/2024    Procedure: COLONOSCOPY;  Surgeon: Lj Tamayo MD;  Location: [facility];  Service: Endoscopy;  Laterality: N/A;    EYE SURGERY Bilateral     cataract  2914 & 2019    tear duct surgery to the left eye - 1973      TRANSCATHETER AORTIC VALVE REPLACEMENT (TAVR) N/A 1/19/2021    Procedure: REPLACEMENT, AORTIC VALVE, TRANSCATHETER (TAVR);  Surgeon: Luis F Plaza MD;  Location: [facility] CATH LAB;  Service: Cardiology;  Laterality: N/A;    VALVE STUDY-AORTIC  6/17/2024    Procedure: Valve study-aortic;  Surgeon: Logan Pham MD;  Location: [facility] CATH LAB;  Service: Cardiology;;    VENTRICULOGRAM, LEFT  6/17/2024    Procedure: Ventriculogram, Left;  Surgeon: Logan Pham MD;  Location: [facility] CATH LAB;  Service:  Cardiology;;     Family History       Problem Relation (Age of Onset)    Heart disease Father, Brother    No Known Problems Mother, Sister, Maternal Aunt, Maternal Uncle, Paternal Aunt, Paternal Uncle, Maternal Grandmother, Maternal Grandfather, Paternal Grandmother, Paternal Grandfather          Social History     Socioeconomic History    Marital status: Single   Tobacco Use    Smoking status: Never    Smokeless tobacco: Never   Substance and Sexual Activity    Alcohol use: Not Currently     Comment: 3 beers occ    Drug use: No       Current medications Reviewed    Review of Systems   Constitutional:  Positive for activity change.   HENT:  Negative for nosebleeds.    Eyes:  Negative for visual disturbance.   Respiratory:  Positive for shortness of breath.    Cardiovascular:  Negative for palpitations and leg swelling.   Gastrointestinal:  Negative for nausea.   Musculoskeletal:  Positive for arthralgias.   Skin:  Negative for color change.   Neurological:  Negative for dizziness.   Hematological:  Does not bruise/bleed easily.   Psychiatric/Behavioral:  Negative for sleep disturbance.      Objective:   Physical Exam  Vitals reviewed.   Constitutional:       General: He is not in acute distress.     Appearance: He is well-developed. He is obese. He is not diaphoretic.   HENT:      Head: Normocephalic and atraumatic.   Eyes:      Pupils: Pupils are equal, round, and reactive to light.   Neck:      Vascular: No JVD.   Cardiovascular:      Rate and Rhythm: Bradycardia present.   Pulmonary:      Effort: Pulmonary effort is normal. No respiratory distress.   Abdominal:      General: There is no distension.   Musculoskeletal:         General: Normal range of motion.      Cervical back: Normal range of motion.   Skin:     Coloration: Skin is not pale.   Neurological:      General: No focal deficit present.      Mental Status: He is alert.   Psychiatric:         Speech: Speech normal.         Behavior: Behavior normal.          Thought Content: Thought content normal.         Judgment: Judgment normal.         Diagnostic Results:   [facility] 6/17/24    The ejection fraction was calculated to be 65%.    There was non-obstructive coronary artery disease..    There was severe aortic valve stenosis and trivial (1+) aortic regurgitation.    The pre-procedure left ventricular end diastolic pressure was 25.    The estimated blood loss was <50 mL.      TTE 5/8/24    Left Ventricle: The left ventricle is mildly dilated. Ventricular mass is normal. Normal wall thickness. Normal wall motion. There is normal systolic function. Ejection fraction by visual approximation is 65%.    Right Ventricle: Normal right ventricular cavity size. Wall thickness is normal. Systolic function is normal.    Left Atrium: Left atrium is mildly dilated.    Aortic Valve: There is a transcatheter valve replacement in the aortic position. It is reported to be a 29 mm Medtronic Evolut Pro valve. Elevated prosthetic gradient. Aortic valve area by VTI is 0.94 cm². Aortic valve peak velocity is 4.4 m/s. Mean gradient is 50 mmHg. The dimensionless index is 0.25. Acceleration time is ~100 msec. Findings are suggestive of valvular obstruction. There is trace aortic regurgitation.    IVC/SVC: Normal venous pressure at 3 mmHg.    Pericardium: There is a small posterior effusion.    Assessment:   AS of TAVR valve   Plan:   Patient not a surgical candidate due to obesity and use of a cane. Continue with valve in valve TAVR .  Patient understands the risks and benefits and wants to proceed with the implantation of valve in valve TAVR.

## 2024-09-09 ENCOUNTER — LAB VISIT (OUTPATIENT)
Dept: LAB | Facility: HOSPITAL | Age: 69
End: 2024-09-09
Attending: INTERNAL MEDICINE
Payer: MEDICARE

## 2024-09-09 ENCOUNTER — OFFICE VISIT (OUTPATIENT)
Dept: CARDIOLOGY | Facility: CLINIC | Age: 69
End: 2024-09-09
Payer: MEDICARE

## 2024-09-09 ENCOUNTER — PATIENT MESSAGE (OUTPATIENT)
Dept: CARDIOLOGY | Facility: CLINIC | Age: 69
End: 2024-09-09

## 2024-09-09 ENCOUNTER — OFFICE VISIT (OUTPATIENT)
Dept: CARDIOTHORACIC SURGERY | Facility: CLINIC | Age: 69
End: 2024-09-09
Payer: MEDICARE

## 2024-09-09 VITALS
WEIGHT: 298.19 LBS | BODY MASS INDEX: 44.17 KG/M2 | OXYGEN SATURATION: 98 % | DIASTOLIC BLOOD PRESSURE: 62 MMHG | HEART RATE: 59 BPM | SYSTOLIC BLOOD PRESSURE: 132 MMHG | HEIGHT: 69 IN

## 2024-09-09 VITALS
BODY MASS INDEX: 43.69 KG/M2 | WEIGHT: 295 LBS | OXYGEN SATURATION: 97 % | HEART RATE: 67 BPM | DIASTOLIC BLOOD PRESSURE: 64 MMHG | HEIGHT: 69 IN | SYSTOLIC BLOOD PRESSURE: 145 MMHG

## 2024-09-09 DIAGNOSIS — I50.32 CHRONIC DIASTOLIC HEART FAILURE: ICD-10-CM

## 2024-09-09 DIAGNOSIS — Z95.2 S/P TAVR (TRANSCATHETER AORTIC VALVE REPLACEMENT): Primary | ICD-10-CM

## 2024-09-09 DIAGNOSIS — I10 HYPERTENSION, UNSPECIFIED TYPE: ICD-10-CM

## 2024-09-09 DIAGNOSIS — I35.0 NONRHEUMATIC AORTIC VALVE STENOSIS: ICD-10-CM

## 2024-09-09 DIAGNOSIS — I50.32 CHRONIC DIASTOLIC HEART FAILURE: Primary | ICD-10-CM

## 2024-09-09 DIAGNOSIS — E78.00 HYPERCHOLESTEROLEMIA: ICD-10-CM

## 2024-09-09 DIAGNOSIS — Z95.2 S/P TAVR (TRANSCATHETER AORTIC VALVE REPLACEMENT): ICD-10-CM

## 2024-09-09 DIAGNOSIS — D69.6 THROMBOCYTOPENIA: ICD-10-CM

## 2024-09-09 DIAGNOSIS — E66.01 MORBID OBESITY: ICD-10-CM

## 2024-09-09 LAB
ANION GAP SERPL CALC-SCNC: 8 MMOL/L (ref 8–16)
BUN SERPL-MCNC: 13 MG/DL (ref 8–23)
CALCIUM SERPL-MCNC: 9.6 MG/DL (ref 8.7–10.5)
CHLORIDE SERPL-SCNC: 107 MMOL/L (ref 95–110)
CO2 SERPL-SCNC: 26 MMOL/L (ref 23–29)
CREAT SERPL-MCNC: 1 MG/DL (ref 0.5–1.4)
ERYTHROCYTE [DISTWIDTH] IN BLOOD BY AUTOMATED COUNT: 14.1 % (ref 11.5–14.5)
EST. GFR  (NO RACE VARIABLE): >60 ML/MIN/1.73 M^2
GLUCOSE SERPL-MCNC: 123 MG/DL (ref 70–110)
HCT VFR BLD AUTO: 39.4 % (ref 40–54)
HGB BLD-MCNC: 12.6 G/DL (ref 14–18)
MCH RBC QN AUTO: 29.3 PG (ref 27–31)
MCHC RBC AUTO-ENTMCNC: 32 G/DL (ref 32–36)
MCV RBC AUTO: 92 FL (ref 82–98)
PLATELET # BLD AUTO: 94 K/UL (ref 150–450)
PMV BLD AUTO: 8.5 FL (ref 9.2–12.9)
POTASSIUM SERPL-SCNC: 4.5 MMOL/L (ref 3.5–5.1)
RBC # BLD AUTO: 4.3 M/UL (ref 4.6–6.2)
SODIUM SERPL-SCNC: 141 MMOL/L (ref 136–145)
WBC # BLD AUTO: 4.16 K/UL (ref 3.9–12.7)

## 2024-09-09 PROCEDURE — 1126F AMNT PAIN NOTED NONE PRSNT: CPT | Mod: CPTII,S$GLB,, | Performed by: THORACIC SURGERY (CARDIOTHORACIC VASCULAR SURGERY)

## 2024-09-09 PROCEDURE — 80048 BASIC METABOLIC PNL TOTAL CA: CPT | Performed by: INTERNAL MEDICINE

## 2024-09-09 PROCEDURE — 3008F BODY MASS INDEX DOCD: CPT | Mod: CPTII,S$GLB,, | Performed by: THORACIC SURGERY (CARDIOTHORACIC VASCULAR SURGERY)

## 2024-09-09 PROCEDURE — 1159F MED LIST DOCD IN RCRD: CPT | Mod: CPTII,S$GLB,, | Performed by: THORACIC SURGERY (CARDIOTHORACIC VASCULAR SURGERY)

## 2024-09-09 PROCEDURE — 99215 OFFICE O/P EST HI 40 MIN: CPT | Mod: S$GLB,,, | Performed by: INTERNAL MEDICINE

## 2024-09-09 PROCEDURE — 3008F BODY MASS INDEX DOCD: CPT | Mod: CPTII,S$GLB,, | Performed by: INTERNAL MEDICINE

## 2024-09-09 PROCEDURE — 85027 COMPLETE CBC AUTOMATED: CPT | Performed by: INTERNAL MEDICINE

## 2024-09-09 PROCEDURE — 99999 PR PBB SHADOW E&M-EST. PATIENT-LVL III: CPT | Mod: PBBFAC,,, | Performed by: THORACIC SURGERY (CARDIOTHORACIC VASCULAR SURGERY)

## 2024-09-09 PROCEDURE — 4010F ACE/ARB THERAPY RXD/TAKEN: CPT | Mod: CPTII,S$GLB,, | Performed by: INTERNAL MEDICINE

## 2024-09-09 PROCEDURE — 4010F ACE/ARB THERAPY RXD/TAKEN: CPT | Mod: CPTII,S$GLB,, | Performed by: THORACIC SURGERY (CARDIOTHORACIC VASCULAR SURGERY)

## 2024-09-09 PROCEDURE — 1100F PTFALLS ASSESS-DOCD GE2>/YR: CPT | Mod: CPTII,S$GLB,, | Performed by: INTERNAL MEDICINE

## 2024-09-09 PROCEDURE — 36415 COLL VENOUS BLD VENIPUNCTURE: CPT | Performed by: INTERNAL MEDICINE

## 2024-09-09 PROCEDURE — 99204 OFFICE O/P NEW MOD 45 MIN: CPT | Mod: S$GLB,,, | Performed by: THORACIC SURGERY (CARDIOTHORACIC VASCULAR SURGERY)

## 2024-09-09 PROCEDURE — 1126F AMNT PAIN NOTED NONE PRSNT: CPT | Mod: CPTII,S$GLB,, | Performed by: INTERNAL MEDICINE

## 2024-09-09 PROCEDURE — 1159F MED LIST DOCD IN RCRD: CPT | Mod: CPTII,S$GLB,, | Performed by: INTERNAL MEDICINE

## 2024-09-09 PROCEDURE — 3075F SYST BP GE 130 - 139MM HG: CPT | Mod: CPTII,S$GLB,, | Performed by: THORACIC SURGERY (CARDIOTHORACIC VASCULAR SURGERY)

## 2024-09-09 PROCEDURE — 99999 PR PBB SHADOW E&M-EST. PATIENT-LVL IV: CPT | Mod: PBBFAC,,, | Performed by: INTERNAL MEDICINE

## 2024-09-09 PROCEDURE — 3078F DIAST BP <80 MM HG: CPT | Mod: CPTII,S$GLB,, | Performed by: INTERNAL MEDICINE

## 2024-09-09 PROCEDURE — 1160F RVW MEDS BY RX/DR IN RCRD: CPT | Mod: CPTII,S$GLB,, | Performed by: THORACIC SURGERY (CARDIOTHORACIC VASCULAR SURGERY)

## 2024-09-09 PROCEDURE — 3288F FALL RISK ASSESSMENT DOCD: CPT | Mod: CPTII,S$GLB,, | Performed by: INTERNAL MEDICINE

## 2024-09-09 PROCEDURE — 3078F DIAST BP <80 MM HG: CPT | Mod: CPTII,S$GLB,, | Performed by: THORACIC SURGERY (CARDIOTHORACIC VASCULAR SURGERY)

## 2024-09-09 PROCEDURE — 3075F SYST BP GE 130 - 139MM HG: CPT | Mod: CPTII,S$GLB,, | Performed by: INTERNAL MEDICINE

## 2024-09-09 RX ORDER — GLUCAGON 1 MG
1 KIT INJECTION
OUTPATIENT
Start: 2024-09-09

## 2024-09-09 RX ORDER — FLUCONAZOLE 200 MG/1
200 TABLET ORAL DAILY
Qty: 1 TABLET | Refills: 0 | Status: SHIPPED | OUTPATIENT
Start: 2024-09-09 | End: 2024-09-09

## 2024-09-09 RX ORDER — SODIUM CHLORIDE 0.9 % (FLUSH) 0.9 %
10 SYRINGE (ML) INJECTION
OUTPATIENT
Start: 2024-09-09

## 2024-09-09 RX ORDER — HALOPERIDOL 5 MG/ML
0.5 INJECTION INTRAMUSCULAR EVERY 10 MIN PRN
OUTPATIENT
Start: 2024-09-09

## 2024-09-09 RX ORDER — FLUCONAZOLE 200 MG/1
200 TABLET ORAL DAILY
Qty: 1 TABLET | Refills: 0 | Status: ON HOLD | OUTPATIENT
Start: 2024-09-09 | End: 2024-09-12 | Stop reason: CLARIF

## 2024-09-09 RX ORDER — HYDROMORPHONE HYDROCHLORIDE 1 MG/ML
0.2 INJECTION, SOLUTION INTRAMUSCULAR; INTRAVENOUS; SUBCUTANEOUS EVERY 5 MIN PRN
OUTPATIENT
Start: 2024-09-09

## 2024-09-09 NOTE — H&P (VIEW-ONLY)
"Referring provider: No ref. provider found     Patient ID:  Ervin Hernandez is a 69 y.o. y.o. male who presents for follow-up No chief complaint on file.    He had a 29mm Evolut valve placed in 2021 and initially felt well. However, on repeat TTE in 2022 he was found to have severe bioprosthetic AS. Initially there was concern this was related to valve obstruction, and he was placed on warfarin. Valve obstruction remained severe on valve study 3 months later, and he was then scheduled for valve in valve TAVR. He states he currently has TAVAREZ when walking mild-moderate lengths, and can only walk 2 blocks without having to rest. Denies angina or any other complaints.     Ervin Hernandez is a 69 y.o. male referred by Dr Palmer for evaluation of severe AS secondary to degenerative TAVR valve (NYHA Class II sx).    The patient has undergone the following TAVR work-up:     ECHO (Date 5/2024): MARCELA= 0.94 cm2, MG= 50mmHg, Peak Eusebio= 4.4 m/s, EF= 65%.   LHC (Date 06/17/24 by Dr. Schulte): Non obstructive CAD.   STS: 1.5%   Frailty: 0/4   Iliacs are >9.69 on R and > 9.99 on L   LVOT area (heavily calcified) by CTA is 4.59 cm2 (27.9 mm X 22.4 mm) and Avg Diameter is 24.2  Incidental findings on CT: Bilateral calcified and noncalcified pulmonary nodules.  Largest nodule measures 1.5 cm in the medial basal segment of the right lower lobe need follow up with PCP (followed by pulm).  CT Surgery risk assessment: high risk per Dr. Wills   Rhythm issues: NSR with RBBB, high risk for ppm   PFTs: N/A  Comorbidities:Obesity, hypertension, hyperlipidemia, prior TAVR     Ervin Hernandez is a 26 mm  S3 valve candidate via RTF access.        Review of Systems   Respiratory:  Positive for shortness of breath.    All other systems reviewed and are negative.     Objective:     BP (!) 145/64 (BP Location: Right arm, Patient Position: Sitting, BP Method: Large (Automatic))   Pulse 67   Ht 5' 9" (1.753 m)   Wt 133.8 kg (294 lb 15.6 " "oz)   SpO2 97%   BMI 43.56 kg/m²     Physical Exam  Vitals and nursing note reviewed.   Constitutional:       Appearance: Normal appearance. He is obese.   HENT:      Head: Normocephalic and atraumatic.      Right Ear: External ear normal.      Left Ear: External ear normal.      Nose: Nose normal.      Mouth/Throat:      Mouth: Mucous membranes are moist.   Eyes:      Pupils: Pupils are equal, round, and reactive to light.   Cardiovascular:      Rate and Rhythm: Normal rate and regular rhythm.      Pulses: Normal pulses.      Heart sounds: Murmur heard.   Pulmonary:      Effort: Pulmonary effort is normal.   Abdominal:      General: Abdomen is flat.   Musculoskeletal:         General: Normal range of motion.      Cervical back: Normal range of motion.   Skin:     General: Skin is warm.      Capillary Refill: Capillary refill takes less than 2 seconds.   Neurological:      General: No focal deficit present.      Mental Status: He is alert and oriented to person, place, and time. Mental status is at baseline.     Labs:     Lab Results   Component Value Date     06/17/2024    K 4.2 06/17/2024     06/17/2024    CO2 24 06/17/2024    BUN 12 06/17/2024    CREATININE 1.0 06/17/2024    GLUCOSE 100 (H) 11/28/2022    ANIONGAP 9 06/17/2024     No results found for: "HGBA1C"  No results found for: "BNP", "BNPTRIAGEBLO"    Lab Results   Component Value Date    WBC 4.16 09/09/2024    HGB 12.6 (L) 09/09/2024    HCT 39.4 (L) 09/09/2024    PLT 94 (L) 09/09/2024    GRAN 3.6 01/20/2021    GRAN 67.9 01/20/2021     No results found for: "CHOL", "HDL", "LDLCALC", "TRIG"    Meds:     Current Outpatient Medications:     ascorbic acid, vitamin C, (VITAMIN C) 1000 MG tablet, Take 1,000 mg by mouth once daily., Disp: , Rfl:     aspirin 81 MG Chew, Take 81 mg by mouth once daily., Disp: , Rfl:     atenoloL (TENORMIN) 50 MG tablet, TAKE 1 TABLET BY MOUTH EVERY DAY, Disp: 90 tablet, Rfl: 3    atorvastatin (LIPITOR) 40 MG tablet, " Take 40 mg by mouth every evening. , Disp: , Rfl:     chromium-brindal berry (GARCINIA CAMBOGIA) 200-500 mcg-mg Tab, Take 1 tablet by mouth Daily. Actually 900 mg, Disp: , Rfl:     co-enzyme Q-10 30 mg capsule, Take 100 mg by mouth once daily. , Disp: , Rfl:     cyanocobalamin 500 MCG tablet, 1 tablet Orally Once a day, Disp: , Rfl:     fluvoxaMINE (LUVOX) 100 MG tablet, Take 100 mg by mouth every evening., Disp: , Rfl:     folic acid/multivit-min/lutein (CENTRUM SILVER ORAL), Take by mouth., Disp: , Rfl:     furosemide (LASIX) 20 MG tablet, Take AS NEEDED, daily for 3 lb weight gain overnight or 5 lbs in 5 days., Disp: 10 tablet, Rfl: 0    mv-min-folic-K1-lycopen-lutein (CENTRUM SILVER ULTRA MEN'S) 524--300 mcg Tab, Centrum Silver Ultra Mens, Disp: , Rfl:     QUEtiapine (SEROQUEL) 50 MG tablet, Take 50 mg by mouth 2 (two) times daily. , Disp: , Rfl:     tamsulosin (FLOMAX) 0.4 mg Cap, Take 0.4 mg by mouth once daily., Disp: , Rfl:     telmisartan (MICARDIS) 80 MG Tab, TAKE 1 TABLET BY MOUTH EVERY DAY, Disp: 90 tablet, Rfl: 4    vitamin D (VITAMIN D3) 1000 units Tab, 1 tablet Orally Once a day, Disp: , Rfl:     zolpidem (AMBIEN) 10 mg Tab, Take 10 mg by mouth nightly as needed (1/2 as needed for sleep)., Disp: , Rfl:     fluconazole (DIFLUCAN) 200 MG Tab, Take 1 tablet (200 mg total) by mouth once daily., Disp: 1 tablet, Rfl: 0    Current Facility-Administered Medications:     sodium chloride 0.9% flush 10 mL, 10 mL, Intravenous, PRN, Logan Pham MD    sodium chloride 0.9% flush 10 mL, 10 mL, Intravenous, PRN, Logan Pham MD      Assessment & Plan:     Chronic diastolic heart failure  -continue bp control and lasix     S/P TAVR (transcatheter aortic valve replacement)  -severe AS with TAVR degeneration   -plan for TAV in TAV tomorrow     Hypercholesterolemia  -atorvastatin     Hypertension  -continue home medications     Morbid obesity  -diet and weight loss     Thrombocytopenia  -stable,  continue routine fu     Aortic stenosis  Ervin Hernandez is a 69 y.o. male referred by Dr Palmer for evaluation of severe AS secondary to degenerative TAVR valve (NYHA Class II sx).    The patient has undergone the following TAVR work-up:     ECHO (Date 5/2024): MARCELA= 0.94 cm2, MG= 50mmHg, Peak Eusebio= 4.4 m/s, EF= 65%.   LHC (Date 06/17/24 by Dr. Schulte): Non obstructive CAD.   STS: 1.5%   Frailty: 0/4   Iliacs are >9.69 on R and > 9.99 on L   LVOT area (heavily calcified) by CTA is 4.59 cm2 (27.9 mm X 22.4 mm) and Avg Diameter is 24.2  Incidental findings on CT: Bilateral calcified and noncalcified pulmonary nodules.  Largest nodule measures 1.5 cm in the medial basal segment of the right lower lobe need follow up with PCP (followed by pulm).  CT Surgery risk assessment: high risk per Dr. Wills   Rhythm issues: NSR with RBBB, high risk for ppm   PFTs: N/A  Comorbidities:Obesity, hypertension, hyperlipidemia, prior TAVR     Ervin Hernandez is a 26 mm  S3 valve candidate via RTF access.    Transcatheter Aortic valve replacement   Valve: 26 S3  ECMO:  On Call  TAVR access: RTF  Valvuloplasty balloon: N/A  Viabahn size if needed: 10 x 5  Antithrombotic therapy: asa alone  Temporary pacing wire: Yes  Pacemaker risk factors: RBBB high risk for ppm   Post op destination: Stepdown  Antibiotics given: (to be done during procedure)  Heart failure on admission?  CONSENTING:  The patient was told that the procedure carries around a 12.5% risk of permanent pacemaker requirement and that risk depends on the patients underlying conduction system.  Prior to the clinc visit, all available records from referring provider were reviewed.  I have personally reviewed all the lab tests available related to the patient's case  The patient's images were reviewed by myself and the procedural planning was done with my own interpretation of the iliac and aortic anatomy based on CTA, angiography or IBAN. I have reviewed all other  imaging studies relevant to the patient including echocardiography and coronary angiography.  Patient was educated abut the pathophysiology and natural history of severe aortic stenosis and was educated about the treatment options including surgical and transcatheter valve replacement. She agrees to be full code for the forseable future and to undergo workup for treatment of severe AS.   The risks, benefits, and alternatives of transcatheter aortic valve replacement were discussed with the patient. All questions were answered and informed consent was obtained. I had a detailed discussion with the patient regarding risk of stroke, MI, bleeding access site complications including limb loss, allergy, kidney failure including dialysis and death.  The patient understands the risks and benefits and wishes to go ahead with the procedure.  The referring Cardiologist was notified of the plan  All patient's questions were answered    Shared Decision Making:  This patient was seen today by a multidisciplinary heart team involving both a Structural Heart Specialist (Interventional Cardiologist) and a Cardiothoracic Surgeon. The patient was involved in shared decision-making to define clearer goals for treatment and align health decisions with their current values.  The patient understands the risks and expected benefits of their treatment options.            Tony Manuel MD   Interventional Cardiology

## 2024-09-09 NOTE — ASSESSMENT & PLAN NOTE
Ervin Hernandez is a 69 y.o. male referred by Dr Palmer for evaluation of severe AS secondary to degenerative TAVR valve (NYHA Class II sx).    The patient has undergone the following TAVR work-up:     ECHO (Date 5/2024): MARCELA= 0.94 cm2, MG= 50mmHg, Peak Eusebio= 4.4 m/s, EF= 65%.   LHC (Date 06/17/24 by Dr. Schulte): Non obstructive CAD.   STS: 1.5%   Frailty: 0/4   Iliacs are >9.69 on R and > 9.99 on L   LVOT area (heavily calcified) by CTA is 4.59 cm2 (27.9 mm X 22.4 mm) and Avg Diameter is 24.2  Incidental findings on CT: Bilateral calcified and noncalcified pulmonary nodules.  Largest nodule measures 1.5 cm in the medial basal segment of the right lower lobe need follow up with PCP (followed by pulm).  CT Surgery risk assessment: high risk per Dr. Wills   Rhythm issues: NSR with RBBB, high risk for ppm   PFTs: N/A  Comorbidities:Obesity, hypertension, hyperlipidemia, prior TAVR     Ervin Hernandez is a 26 mm  S3 valve candidate via RTF access.    Transcatheter Aortic valve replacement   Valve: 26 S3  ECMO:  On Call  TAVR access: RTF  Valvuloplasty balloon: N/A  Viabahn size if needed: 10 x 5  Antithrombotic therapy: asa alone  Temporary pacing wire: Yes  Pacemaker risk factors: RBBB high risk for ppm   Post op destination: Stepdown  Antibiotics given: (to be done during procedure)  Heart failure on admission?  CONSENTING:  The patient was told that the procedure carries around a 12.5% risk of permanent pacemaker requirement and that risk depends on the patients underlying conduction system.  Prior to the clin visit, all available records from referring provider were reviewed.  I have personally reviewed all the lab tests available related to the patient's case  The patient's images were reviewed by myself and the procedural planning was done with my own interpretation of the iliac and aortic anatomy based on CTA, angiography or IBAN. I have reviewed all other imaging studies relevant to the patient  including echocardiography and coronary angiography.  Patient was educated abut the pathophysiology and natural history of severe aortic stenosis and was educated about the treatment options including surgical and transcatheter valve replacement. She agrees to be full code for the forseable future and to undergo workup for treatment of severe AS.   The risks, benefits, and alternatives of transcatheter aortic valve replacement were discussed with the patient. All questions were answered and informed consent was obtained. I had a detailed discussion with the patient regarding risk of stroke, MI, bleeding access site complications including limb loss, allergy, kidney failure including dialysis and death.  The patient understands the risks and benefits and wishes to go ahead with the procedure.  The referring Cardiologist was notified of the plan  All patient's questions were answered    Shared Decision Making:  This patient was seen today by a multidisciplinary heart team involving both a Structural Heart Specialist (Interventional Cardiologist) and a Cardiothoracic Surgeon. The patient was involved in shared decision-making to define clearer goals for treatment and align health decisions with their current values.  The patient understands the risks and expected benefits of their treatment options.

## 2024-09-09 NOTE — PROGRESS NOTES
"Referring provider: No ref. provider found     Patient ID:  Ervin Hernandez is a 69 y.o. y.o. male who presents for follow-up No chief complaint on file.    He had a 29mm Evolut valve placed in 2021 and initially felt well. However, on repeat TTE in 2022 he was found to have severe bioprosthetic AS. Initially there was concern this was related to valve obstruction, and he was placed on warfarin. Valve obstruction remained severe on valve study 3 months later, and he was then scheduled for valve in valve TAVR. He states he currently has TAVAREZ when walking mild-moderate lengths, and can only walk 2 blocks without having to rest. Denies angina or any other complaints.     Ervin Hernandez is a 69 y.o. male referred by Dr Palmer for evaluation of severe AS secondary to degenerative TAVR valve (NYHA Class II sx).    The patient has undergone the following TAVR work-up:     ECHO (Date 5/2024): MARCELA= 0.94 cm2, MG= 50mmHg, Peak Eusebio= 4.4 m/s, EF= 65%.   LHC (Date 06/17/24 by Dr. Schulte): Non obstructive CAD.   STS: 1.5%   Frailty: 0/4   Iliacs are >9.69 on R and > 9.99 on L   LVOT area (heavily calcified) by CTA is 4.59 cm2 (27.9 mm X 22.4 mm) and Avg Diameter is 24.2  Incidental findings on CT: Bilateral calcified and noncalcified pulmonary nodules.  Largest nodule measures 1.5 cm in the medial basal segment of the right lower lobe need follow up with PCP (followed by pulm).  CT Surgery risk assessment: high risk per Dr. Wills   Rhythm issues: NSR with RBBB, high risk for ppm   PFTs: N/A  Comorbidities:Obesity, hypertension, hyperlipidemia, prior TAVR     Ervin Hernandez is a 26 mm  S3 valve candidate via RTF access.        Review of Systems   Respiratory:  Positive for shortness of breath.    All other systems reviewed and are negative.     Objective:     BP (!) 145/64 (BP Location: Right arm, Patient Position: Sitting, BP Method: Large (Automatic))   Pulse 67   Ht 5' 9" (1.753 m)   Wt 133.8 kg (294 lb 15.6 " "oz)   SpO2 97%   BMI 43.56 kg/m²     Physical Exam  Vitals and nursing note reviewed.   Constitutional:       Appearance: Normal appearance. He is obese.   HENT:      Head: Normocephalic and atraumatic.      Right Ear: External ear normal.      Left Ear: External ear normal.      Nose: Nose normal.      Mouth/Throat:      Mouth: Mucous membranes are moist.   Eyes:      Pupils: Pupils are equal, round, and reactive to light.   Cardiovascular:      Rate and Rhythm: Normal rate and regular rhythm.      Pulses: Normal pulses.      Heart sounds: Murmur heard.   Pulmonary:      Effort: Pulmonary effort is normal.   Abdominal:      General: Abdomen is flat.   Musculoskeletal:         General: Normal range of motion.      Cervical back: Normal range of motion.   Skin:     General: Skin is warm.      Capillary Refill: Capillary refill takes less than 2 seconds.   Neurological:      General: No focal deficit present.      Mental Status: He is alert and oriented to person, place, and time. Mental status is at baseline.     Labs:     Lab Results   Component Value Date     06/17/2024    K 4.2 06/17/2024     06/17/2024    CO2 24 06/17/2024    BUN 12 06/17/2024    CREATININE 1.0 06/17/2024    GLUCOSE 100 (H) 11/28/2022    ANIONGAP 9 06/17/2024     No results found for: "HGBA1C"  No results found for: "BNP", "BNPTRIAGEBLO"    Lab Results   Component Value Date    WBC 4.16 09/09/2024    HGB 12.6 (L) 09/09/2024    HCT 39.4 (L) 09/09/2024    PLT 94 (L) 09/09/2024    GRAN 3.6 01/20/2021    GRAN 67.9 01/20/2021     No results found for: "CHOL", "HDL", "LDLCALC", "TRIG"    Meds:     Current Outpatient Medications:     ascorbic acid, vitamin C, (VITAMIN C) 1000 MG tablet, Take 1,000 mg by mouth once daily., Disp: , Rfl:     aspirin 81 MG Chew, Take 81 mg by mouth once daily., Disp: , Rfl:     atenoloL (TENORMIN) 50 MG tablet, TAKE 1 TABLET BY MOUTH EVERY DAY, Disp: 90 tablet, Rfl: 3    atorvastatin (LIPITOR) 40 MG tablet, " Take 40 mg by mouth every evening. , Disp: , Rfl:     chromium-brindal berry (GARCINIA CAMBOGIA) 200-500 mcg-mg Tab, Take 1 tablet by mouth Daily. Actually 900 mg, Disp: , Rfl:     co-enzyme Q-10 30 mg capsule, Take 100 mg by mouth once daily. , Disp: , Rfl:     cyanocobalamin 500 MCG tablet, 1 tablet Orally Once a day, Disp: , Rfl:     fluvoxaMINE (LUVOX) 100 MG tablet, Take 100 mg by mouth every evening., Disp: , Rfl:     folic acid/multivit-min/lutein (CENTRUM SILVER ORAL), Take by mouth., Disp: , Rfl:     furosemide (LASIX) 20 MG tablet, Take AS NEEDED, daily for 3 lb weight gain overnight or 5 lbs in 5 days., Disp: 10 tablet, Rfl: 0    mv-min-folic-K1-lycopen-lutein (CENTRUM SILVER ULTRA MEN'S) 568--300 mcg Tab, Centrum Silver Ultra Mens, Disp: , Rfl:     QUEtiapine (SEROQUEL) 50 MG tablet, Take 50 mg by mouth 2 (two) times daily. , Disp: , Rfl:     tamsulosin (FLOMAX) 0.4 mg Cap, Take 0.4 mg by mouth once daily., Disp: , Rfl:     telmisartan (MICARDIS) 80 MG Tab, TAKE 1 TABLET BY MOUTH EVERY DAY, Disp: 90 tablet, Rfl: 4    vitamin D (VITAMIN D3) 1000 units Tab, 1 tablet Orally Once a day, Disp: , Rfl:     zolpidem (AMBIEN) 10 mg Tab, Take 10 mg by mouth nightly as needed (1/2 as needed for sleep)., Disp: , Rfl:     fluconazole (DIFLUCAN) 200 MG Tab, Take 1 tablet (200 mg total) by mouth once daily., Disp: 1 tablet, Rfl: 0    Current Facility-Administered Medications:     sodium chloride 0.9% flush 10 mL, 10 mL, Intravenous, PRN, Logan Pham MD    sodium chloride 0.9% flush 10 mL, 10 mL, Intravenous, PRN, Logan Pham MD      Assessment & Plan:     Chronic diastolic heart failure  -continue bp control and lasix     S/P TAVR (transcatheter aortic valve replacement)  -severe AS with TAVR degeneration   -plan for TAV in TAV tomorrow     Hypercholesterolemia  -atorvastatin     Hypertension  -continue home medications     Morbid obesity  -diet and weight loss     Thrombocytopenia  -stable,  continue routine fu     Aortic stenosis  Ervin Hernandez is a 69 y.o. male referred by Dr Palmer for evaluation of severe AS secondary to degenerative TAVR valve (NYHA Class II sx).    The patient has undergone the following TAVR work-up:     ECHO (Date 5/2024): MARCELA= 0.94 cm2, MG= 50mmHg, Peak Eusebio= 4.4 m/s, EF= 65%.   LHC (Date 06/17/24 by Dr. Schulte): Non obstructive CAD.   STS: 1.5%   Frailty: 0/4   Iliacs are >9.69 on R and > 9.99 on L   LVOT area (heavily calcified) by CTA is 4.59 cm2 (27.9 mm X 22.4 mm) and Avg Diameter is 24.2  Incidental findings on CT: Bilateral calcified and noncalcified pulmonary nodules.  Largest nodule measures 1.5 cm in the medial basal segment of the right lower lobe need follow up with PCP (followed by pulm).  CT Surgery risk assessment: high risk per Dr. Wills   Rhythm issues: NSR with RBBB, high risk for ppm   PFTs: N/A  Comorbidities:Obesity, hypertension, hyperlipidemia, prior TAVR     Ervin Hernandez is a 26 mm  S3 valve candidate via RTF access.    Transcatheter Aortic valve replacement   Valve: 26 S3  ECMO:  On Call  TAVR access: RTF  Valvuloplasty balloon: N/A  Viabahn size if needed: 10 x 5  Antithrombotic therapy: asa alone  Temporary pacing wire: Yes  Pacemaker risk factors: RBBB high risk for ppm   Post op destination: Stepdown  Antibiotics given: (to be done during procedure)  Heart failure on admission?  CONSENTING:  The patient was told that the procedure carries around a 12.5% risk of permanent pacemaker requirement and that risk depends on the patients underlying conduction system.  Prior to the clinc visit, all available records from referring provider were reviewed.  I have personally reviewed all the lab tests available related to the patient's case  The patient's images were reviewed by myself and the procedural planning was done with my own interpretation of the iliac and aortic anatomy based on CTA, angiography or IBAN. I have reviewed all other  imaging studies relevant to the patient including echocardiography and coronary angiography.  Patient was educated abut the pathophysiology and natural history of severe aortic stenosis and was educated about the treatment options including surgical and transcatheter valve replacement. She agrees to be full code for the forseable future and to undergo workup for treatment of severe AS.   The risks, benefits, and alternatives of transcatheter aortic valve replacement were discussed with the patient. All questions were answered and informed consent was obtained. I had a detailed discussion with the patient regarding risk of stroke, MI, bleeding access site complications including limb loss, allergy, kidney failure including dialysis and death.  The patient understands the risks and benefits and wishes to go ahead with the procedure.  The referring Cardiologist was notified of the plan  All patient's questions were answered    Shared Decision Making:  This patient was seen today by a multidisciplinary heart team involving both a Structural Heart Specialist (Interventional Cardiologist) and a Cardiothoracic Surgeon. The patient was involved in shared decision-making to define clearer goals for treatment and align health decisions with their current values.  The patient understands the risks and expected benefits of their treatment options.            Tony Manuel MD   Interventional Cardiology

## 2024-09-09 NOTE — ANESTHESIA PREPROCEDURE EVALUATION
[facility]  Anesthesia Pre-Operative Evaluation         Patient Name: Ervin Hernandez  YOB: 1955  MRN: [id number]    SUBJECTIVE:     Pre-operative evaluation for Procedure(s) (LRB):  REPLACEMENT, AORTIC VALVE, TRANSCATHETER (TAVR) (N/A)     09/09/2024    Ervin Hernandez is a 69 y.o. male w/ a significant PMHx of  Morbid Obesity, HTN, HLD, CAD, AS, s/p TAVR in 2021 (29mm Evolut valve) On repeat TTE in 2022 he was found to have severe bioprosthetic AS. Valve obstruction remained severe on valve study 3 months later. Endorses worsening dyspnea on exertion     Patient now presents for the above procedure(s).    Echo Summary 5/8/24:      Left Ventricle: The left ventricle is mildly dilated. Ventricular mass is normal. Normal wall thickness. Normal wall motion. There is normal systolic function. Ejection fraction by visual approximation is 65%.    Right Ventricle: Normal right ventricular cavity size. Wall thickness is normal. Systolic function is normal.    Left Atrium: Left atrium is mildly dilated.    Aortic Valve: There is a transcatheter valve replacement in the aortic position. It is reported to be a 29 mm Medtronic Evolut Pro valve. Elevated prosthetic gradient. Aortic valve area by VTI is 0.94 cm². Aortic valve peak velocity is 4.4 m/s. Mean gradient is 50 mmHg. The dimensionless index is 0.25. Acceleration time is ~100 msec. Findings are suggestive of valvular obstruction. There is trace aortic regurgitation.    IVC/SVC: Normal venous pressure at 3 mmHg.    Pericardium: There is a small posterior effusion.     [facility] 06/17/24 :     Non obstructive CAD       Patient Active Problem List   Diagnosis    Aortic stenosis    Hypertension    Hypercholesterolemia    Family history of ischemic heart disease    Morbid obesity    S/P TAVR (transcatheter aortic valve replacement)    Cellulitis    Coronary artery disease involving native coronary artery of native heart with angina pectoris     Chronic diastolic heart failure    Thrombocytopenia    Long term (current) use of anticoagulants       Review of patient's allergies indicates:   Allergen Reactions    Sulfa (sulfonamide antibiotics) Other (See Comments)     bleeding       Current Inpatient Medications:      Current Facility-Administered Medications on File Prior to Encounter   Medication Dose Route Frequency Provider Last Rate Last Admin    sodium chloride 0.9% flush 10 mL  10 mL Intravenous PRN Logan Pham MD        sodium chloride 0.9% flush 10 mL  10 mL Intravenous PRN Logan Pham MD         Current Outpatient Medications on File Prior to Encounter   Medication Sig Dispense Refill    ascorbic acid, vitamin C, (VITAMIN C) 1000 MG tablet Take 1,000 mg by mouth once daily.      aspirin 81 MG Chew Take 81 mg by mouth once daily.      atenoloL (TENORMIN) 50 MG tablet TAKE 1 TABLET BY MOUTH EVERY DAY 90 tablet 3    atorvastatin (LIPITOR) 40 MG tablet Take 40 mg by mouth every evening.       chromium-brindal berry (GARCINIA CAMBOGIA) 200-500 mcg-mg Tab Take 1 tablet by mouth Daily. Actually 900 mg      co-enzyme Q-10 30 mg capsule Take 100 mg by mouth once daily.       cyanocobalamin 500 MCG tablet 1 tablet Orally Once a day      fluvoxaMINE (LUVOX) 100 MG tablet Take 100 mg by mouth every evening.      folic acid/multivit-min/lutein (CENTRUM SILVER ORAL) Take by mouth.      furosemide (LASIX) 20 MG tablet Take AS NEEDED, daily for 3 lb weight gain overnight or 5 lbs in 5 days. (Patient not taking: Reported on 9/9/2024) 10 tablet 0    mv-min-folic-K1-lycopen-lutein (CENTRUM SILVER ULTRA MEN'S) 724--300 mcg Tab Centrum Silver Ultra Mens      QUEtiapine (SEROQUEL) 50 MG tablet Take 50 mg by mouth 2 (two) times daily.       tamsulosin (FLOMAX) 0.4 mg Cap Take 0.4 mg by mouth once daily. (Patient not taking: Reported on 9/9/2024)      telmisartan (MICARDIS) 80 MG Tab TAKE 1 TABLET BY MOUTH EVERY DAY 90 tablet 4    vitamin D  (VITAMIN D3) 1000 units Tab 1 tablet Orally Once a day      zolpidem (AMBIEN) 10 mg Tab Take 10 mg by mouth nightly as needed (1/2 as needed for sleep).         Past Surgical History:   Procedure Laterality Date    ANGIOGRAM, CORONARY, WITH LEFT HEART CATHETERIZATION N/A 6/17/2024    Procedure: Angiogram, Coronary, with Left Heart Cath;  Surgeon: Logan Pham MD;  Location: [facility] CATH LAB;  Service: Cardiology;  Laterality: N/A;    AORTOGRAPHY N/A 6/17/2024    Procedure: AORTOGRAM;  Surgeon: Logan Pham MD;  Location: [facility] CATH LAB;  Service: Cardiology;  Laterality: N/A;    CARDIAC VALVE SURGERY      CATHETERIZATION OF BOTH LEFT AND RIGHT HEART N/A 10/23/2020    Procedure: CATHETERIZATION, HEART, BOTH LEFT AND RIGHT;  Surgeon: Dusty Schulte MD;  Location: [facility] [place] CATH LAB;  Service: Cardiology;  Laterality: N/A;    COLONOSCOPY      COLONOSCOPY N/A 5/3/2024    Procedure: COLONOSCOPY;  Surgeon: Lj Tamayo MD;  Location: [facility];  Service: Endoscopy;  Laterality: N/A;    EYE SURGERY Bilateral     cataract  2914 & 2019    tear duct surgery to the left eye - 1973      TRANSCATHETER AORTIC VALVE REPLACEMENT (TAVR) N/A 1/19/2021    Procedure: REPLACEMENT, AORTIC VALVE, TRANSCATHETER (TAVR);  Surgeon: Luis F Plaza MD;  Location: [facility] CATH LAB;  Service: Cardiology;  Laterality: N/A;    VALVE STUDY-AORTIC  6/17/2024    Procedure: Valve study-aortic;  Surgeon: Logan Pham MD;  Location: [facility] CATH LAB;  Service: Cardiology;;    VENTRICULOGRAM, LEFT  6/17/2024    Procedure: Ventriculogram, Left;  Surgeon: Logan Pham MD;  Location: [facility] CATH LAB;  Service: Cardiology;;       Social History:  Tobacco Use: Low Risk  (9/9/2024)    Patient History     Smoking Tobacco Use: Never     Smokeless Tobacco Use: Never     Passive Exposure: Not on file      Alcohol Use: Not on file        OBJECTIVE:     Vital Signs Range (Last 24H):  Pulse:  [59-77]   BP: (132-145)/(61-64)   SpO2:  [97  %-98 %]       Significant Labs:  Lab Results   Component Value Date    WBC 4.16 09/09/2024    HGB 12.6 (L) 09/09/2024    HCT 39.4 (L) 09/09/2024    PLT 94 (L) 09/09/2024    ALT 17 11/28/2022    AST 17 11/28/2022     09/09/2024    K 4.5 09/09/2024     09/09/2024    CREATININE 1.0 09/09/2024    BUN 13 09/09/2024    CO2 26 09/09/2024    INR 1.2 06/21/2024       Diagnostic Studies: No relevant studies.    EKG:   Results for orders placed or performed during the hospital encounter of 06/17/24   EKG 12-lead    Collection Time: 06/17/24 10:16 AM   Result Value Ref Range    QRS Duration 128 ms    [facility] QTC Calculation 446 ms    Narrative    Test Reason : Z01.818,    Vent. Rate : 062 BPM     Atrial Rate : 062 BPM     P-R Int : 206 ms          QRS Dur : 128 ms      QT Int : 440 ms       P-R-T Axes : 045 -36 008 degrees     QTc Int : 446 ms    Normal sinus rhythm with sinus arrhythmia  Left axis deviation  Right bundle branch block  Abnormal ECG  When compared with ECG of 20-JAN-2021 04:54,  Right bundle branch block has replaced Incomplete right bundle branch block  Confirmed by Kory GARAY MD ([id number]) on 6/17/2024 11:09:53 AM    Referred By: JAMIE WILSON           Confirmed By:Kory GARAY MD       2D ECHO:  TTE:  Results for orders placed or performed during the hospital encounter of 05/08/24   Echo   Result Value Ref Range    RA Width 4.25 cm    LA volume (mod) 91.11 cm3    Left Atrium Major Axis 5.68 cm    Left Atrium Minor Axis 5.40 cm    RA Major Axis 4.97 cm    LV Diastolic Volume 185.31 mL    LV Systolic Volume 74.23 mL    MV Peak A Eusebio 0.99 m/s    MV stenosis pressure 1/2 time 113.21 ms    MV Peak E Eusebio 0.79 m/s    Ao VTI 94.50 cm    Ao peak eusebio 4.4 m/s    LVOT peak VTI 23.30 cm    LVOT peak eusebio 1.00 m/s    LVOT diameter 2.2 cm    E wave deceleration time 390.36 msec    AV mean gradient 50 mmHg    TAPSE 1.91 cm    RVDD 4.66 cm    LA size 5.06 cm    Ascending aorta 3.04 cm    STJ 2.04 cm    Sinus 3.27 cm     LVIDs 4.10 (A) 2.1 - 4.0 cm    Posterior Wall 1.1 0.6 - 1.1 cm    IVS 1.10 0.6 - 1.1 cm    LVIDd 5.9 3.5 - 6.0 cm    TDI LATERAL 0.07 m/s    LA WIDTH 4.40 cm    TDI SEPTAL 0.07 m/s    LV LATERAL E/E' RATIO 11.29 m/s    LV SEPTAL E/E' RATIO 11.29 m/s    RV/LV Ratio 0.79 cm    FS 31 28 - 44 %    LA volume 104.77 cm3    LV mass 271.88 g    Left Ventricle Relative Wall Thickness 0.37 cm    AV valve area 0.94 cm²    AV Velocity Ratio 0.23     AV index (prosthetic) 0.25     MV valve area p 1/2 method 1.94 cm2    E/A ratio 0.80     Mean e' 0.07 m/s    LVOT area 3.8 cm2    LVOT stroke volume 88.53 cm3    AV peak gradient 77 mmHg    E/E' ratio 11.29 m/s    MARCELA by Velocity Ratio 0.86 cm²    BSA 2.6 m2    LV Systolic Volume Index 30.1 mL/m2    LV Diastolic Volume Index 75.02 mL/m2    LV Mass Index 110 g/m2    LA Volume Index 42.4 mL/m2    LA Volume Index (Mod) 36.9 mL/m2    ZLVIDS -4.96     ZLVIDD -7.82     Est. RA pres 3 mmHg    EF 65 %    Narrative      Left Ventricle: The left ventricle is mildly dilated. Ventricular mass   is normal. Normal wall thickness. Normal wall motion. There is normal   systolic function. Ejection fraction by visual approximation is 65%.    Right Ventricle: Normal right ventricular cavity size. Wall thickness   is normal. Systolic function is normal.    Left Atrium: Left atrium is mildly dilated.    Aortic Valve: There is a transcatheter valve replacement in the aortic   position. It is reported to be a 29 mm Medtronic Evolut Pro valve.   Elevated prosthetic gradient. Aortic valve area by VTI is 0.94 cm². Aortic   valve peak velocity is 4.4 m/s. Mean gradient is 50 mmHg. The   dimensionless index is 0.25. Acceleration time is ~100 msec. Findings are   suggestive of valvular obstruction. There is trace aortic regurgitation.    IVC/SVC: Normal venous pressure at 3 mmHg.    Pericardium: There is a small posterior effusion.         IBAN:  No results found for this or any previous  visit.    ASSESSMENT/PLAN:           Pre-op Assessment    I have reviewed the Patient Summary Reports.     I have reviewed the Nursing Notes.    I have reviewed the Medications.     Review of Systems  Anesthesia Hx:             Denies Family Hx of Anesthesia complications.    Denies Personal Hx of Anesthesia complications.                    Social:  Non-Smoker       Hematology/Oncology:  Hematology Normal   Oncology Normal                                   Cardiovascular:     Hypertension Valvular problems/Murmurs (s/p TAVR), AS  CAD      Angina CHF    hyperlipidemia                               Pulmonary:  Pulmonary Normal                       Renal/:  Chronic Renal Disease, renal hypertension                Hepatic/GI:  Hepatic/GI Normal                 Neurological:  Neurology Normal                                      Endocrine:        Morbid Obesity / BMI > 40  Psych:  Psychiatric History (OCD)                  Physical Exam  General: Well nourished, Cooperative, Alert and Oriented    Airway:  Mallampati: III   Mouth Opening: Normal  TM Distance: Normal  Tongue: Normal  Neck ROM: Normal ROM    Dental:  Intact, Caps / Implants        Anesthesia Plan  Type of Anesthesia, risks & benefits discussed:    Anesthesia Type: Gen ETT, Gen Natural Airway  Intra-op Monitoring Plan: Standard ASA Monitors and Art Line  Post Op Pain Control Plan: multimodal analgesia  Induction:  IV  Airway Plan: Video, Post-Induction  Informed Consent: Informed consent signed with the Patient and all parties understand the risks and agree with anesthesia plan.  All questions answered.   ASA Score: 4  Day of Surgery Review of History & Physical: H&P Update referred to the surgeon/provider.  Anesthesia Plan Notes: MAC vs GETA s/s BMI    Ready For Surgery From Anesthesia Perspective.     .

## 2024-09-10 ENCOUNTER — HOSPITAL ENCOUNTER (INPATIENT)
Facility: HOSPITAL | Age: 69
LOS: 2 days | Discharge: HOME | DRG: 267 | End: 2024-09-12
Attending: INTERNAL MEDICINE | Admitting: INTERNAL MEDICINE
Payer: MEDICARE

## 2024-09-10 ENCOUNTER — ANESTHESIA (OUTPATIENT)
Dept: MEDSURG UNIT | Facility: HOSPITAL | Age: 69
End: 2024-09-10
Payer: MEDICARE

## 2024-09-10 DIAGNOSIS — I35.0 SEVERE AORTIC STENOSIS: ICD-10-CM

## 2024-09-10 DIAGNOSIS — Z95.2 S/P TAVR (TRANSCATHETER AORTIC VALVE REPLACEMENT): ICD-10-CM

## 2024-09-10 LAB
ABO + RH BLD: NORMAL
BLD GP AB SCN CELLS X3 SERPL QL: NORMAL
BNP SERPL-MCNC: 116 PG/ML (ref 0–99)
OHS QRS DURATION: 128 MS
OHS QTC CALCULATION: 460 MS
POCT GLUCOSE: 132 MG/DL (ref 70–110)
SPECIMEN OUTDATE: NORMAL

## 2024-09-10 PROCEDURE — 63600175 PHARM REV CODE 636 W HCPCS

## 2024-09-10 PROCEDURE — 25000003 PHARM REV CODE 250: Performed by: STUDENT IN AN ORGANIZED HEALTH CARE EDUCATION/TRAINING PROGRAM

## 2024-09-10 PROCEDURE — 63600175 PHARM REV CODE 636 W HCPCS: Mod: JZ,JG | Performed by: INTERNAL MEDICINE

## 2024-09-10 PROCEDURE — 86900 BLOOD TYPING SEROLOGIC ABO: CPT | Performed by: INTERNAL MEDICINE

## 2024-09-10 PROCEDURE — 25000003 PHARM REV CODE 250: Performed by: INTERNAL MEDICINE

## 2024-09-10 PROCEDURE — C1769 GUIDE WIRE: HCPCS | Performed by: INTERNAL MEDICINE

## 2024-09-10 PROCEDURE — 83880 ASSAY OF NATRIURETIC PEPTIDE: CPT | Performed by: INTERNAL MEDICINE

## 2024-09-10 PROCEDURE — 02RF38Z REPLACEMENT OF AORTIC VALVE WITH ZOOPLASTIC TISSUE, PERCUTANEOUS APPROACH: ICD-10-PCS | Performed by: INTERNAL MEDICINE

## 2024-09-10 PROCEDURE — 20600001 HC STEP DOWN PRIVATE ROOM

## 2024-09-10 PROCEDURE — 27201423 OPTIME MED/SURG SUP & DEVICES STERILE SUPPLY: Performed by: INTERNAL MEDICINE

## 2024-09-10 PROCEDURE — 33361 REPLACE AORTIC VALVE PERQ: CPT | Mod: Q0 | Performed by: INTERNAL MEDICINE

## 2024-09-10 PROCEDURE — 63600175 PHARM REV CODE 636 W HCPCS: Performed by: STUDENT IN AN ORGANIZED HEALTH CARE EDUCATION/TRAINING PROGRAM

## 2024-09-10 PROCEDURE — 93010 ELECTROCARDIOGRAM REPORT: CPT | Mod: ,,, | Performed by: INTERNAL MEDICINE

## 2024-09-10 PROCEDURE — C1894 INTRO/SHEATH, NON-LASER: HCPCS | Performed by: INTERNAL MEDICINE

## 2024-09-10 PROCEDURE — C1760 CLOSURE DEV, VASC: HCPCS | Performed by: INTERNAL MEDICINE

## 2024-09-10 PROCEDURE — 27800903 OPTIME MED/SURG SUP & DEVICES OTHER IMPLANTS: Performed by: INTERNAL MEDICINE

## 2024-09-10 PROCEDURE — C1779 LEAD, PMKR, TRANSVENOUS VDD: HCPCS | Performed by: INTERNAL MEDICINE

## 2024-09-10 PROCEDURE — 33361 REPLACE AORTIC VALVE PERQ: CPT | Mod: 62,Q0,, | Performed by: INTERNAL MEDICINE

## 2024-09-10 PROCEDURE — 93005 ELECTROCARDIOGRAM TRACING: CPT

## 2024-09-10 PROCEDURE — 86850 RBC ANTIBODY SCREEN: CPT | Performed by: INTERNAL MEDICINE

## 2024-09-10 PROCEDURE — 86901 BLOOD TYPING SEROLOGIC RH(D): CPT | Performed by: INTERNAL MEDICINE

## 2024-09-10 PROCEDURE — 33361 REPLACE AORTIC VALVE PERQ: CPT | Mod: 62,Q0,, | Performed by: STUDENT IN AN ORGANIZED HEALTH CARE EDUCATION/TRAINING PROGRAM

## 2024-09-10 PROCEDURE — 37000009 HC ANESTHESIA EA ADD 15 MINS: Performed by: INTERNAL MEDICINE

## 2024-09-10 PROCEDURE — 27201037 HC PRESSURE MONITORING SET UP

## 2024-09-10 PROCEDURE — 37000008 HC ANESTHESIA 1ST 15 MINUTES: Performed by: INTERNAL MEDICINE

## 2024-09-10 DEVICE — VALVE SAPIEN 3 ULT COMMND 23MM: Type: IMPLANTABLE DEVICE | Site: HEART | Status: FUNCTIONAL

## 2024-09-10 RX ORDER — ATENOLOL 25 MG/1
50 TABLET ORAL DAILY
Status: DISCONTINUED | OUTPATIENT
Start: 2024-09-11 | End: 2024-09-12

## 2024-09-10 RX ORDER — HEPARIN SOD,PORCINE/0.9 % NACL 1000/500ML
INTRAVENOUS SOLUTION INTRAVENOUS
Status: DISCONTINUED | OUTPATIENT
Start: 2024-09-10 | End: 2024-09-10

## 2024-09-10 RX ORDER — QUETIAPINE FUMARATE 25 MG/1
50 TABLET, FILM COATED ORAL 2 TIMES DAILY
Status: DISCONTINUED | OUTPATIENT
Start: 2024-09-10 | End: 2024-09-12 | Stop reason: HOSPADM

## 2024-09-10 RX ORDER — LIDOCAINE HYDROCHLORIDE 20 MG/ML
INJECTION, SOLUTION INFILTRATION; PERINEURAL
Status: DISCONTINUED | OUTPATIENT
Start: 2024-09-10 | End: 2024-09-10

## 2024-09-10 RX ORDER — LIDOCAINE HYDROCHLORIDE 20 MG/ML
INJECTION, SOLUTION EPIDURAL; INFILTRATION; INTRACAUDAL; PERINEURAL
Status: DISCONTINUED | OUTPATIENT
Start: 2024-09-10 | End: 2024-09-10

## 2024-09-10 RX ORDER — ACETAMINOPHEN 325 MG/1
650 TABLET ORAL EVERY 4 HOURS PRN
Status: DISCONTINUED | OUTPATIENT
Start: 2024-09-10 | End: 2024-09-12 | Stop reason: HOSPADM

## 2024-09-10 RX ORDER — TALC
6 POWDER (GRAM) TOPICAL NIGHTLY PRN
Status: DISCONTINUED | OUTPATIENT
Start: 2024-09-10 | End: 2024-09-12 | Stop reason: HOSPADM

## 2024-09-10 RX ORDER — CEFAZOLIN SODIUM 1 G/3ML
INJECTION, POWDER, FOR SOLUTION INTRAMUSCULAR; INTRAVENOUS
Status: DISCONTINUED | OUTPATIENT
Start: 2024-09-10 | End: 2024-09-10

## 2024-09-10 RX ORDER — VALSARTAN 160 MG/1
160 TABLET ORAL 2 TIMES DAILY
Status: DISCONTINUED | OUTPATIENT
Start: 2024-09-10 | End: 2024-09-12 | Stop reason: HOSPADM

## 2024-09-10 RX ORDER — ONDANSETRON 8 MG/1
8 TABLET, ORALLY DISINTEGRATING ORAL EVERY 8 HOURS PRN
Status: DISCONTINUED | OUTPATIENT
Start: 2024-09-10 | End: 2024-09-12 | Stop reason: HOSPADM

## 2024-09-10 RX ORDER — PROPOFOL 10 MG/ML
VIAL (ML) INTRAVENOUS CONTINUOUS PRN
Status: DISCONTINUED | OUTPATIENT
Start: 2024-09-10 | End: 2024-09-10

## 2024-09-10 RX ORDER — SODIUM CHLORIDE 9 MG/ML
INJECTION, SOLUTION INTRAVENOUS CONTINUOUS
Status: ACTIVE | OUTPATIENT
Start: 2024-09-10 | End: 2024-09-10

## 2024-09-10 RX ORDER — NAPROXEN SODIUM 220 MG/1
81 TABLET, FILM COATED ORAL DAILY
Status: DISCONTINUED | OUTPATIENT
Start: 2024-09-11 | End: 2024-09-12 | Stop reason: HOSPADM

## 2024-09-10 RX ORDER — ATORVASTATIN CALCIUM 20 MG/1
40 TABLET, FILM COATED ORAL NIGHTLY
Status: DISCONTINUED | OUTPATIENT
Start: 2024-09-10 | End: 2024-09-12 | Stop reason: HOSPADM

## 2024-09-10 RX ORDER — DIPHENHYDRAMINE HCL 50 MG
50 CAPSULE ORAL ONCE
Status: COMPLETED | OUTPATIENT
Start: 2024-09-10 | End: 2024-09-10

## 2024-09-10 RX ORDER — VALSARTAN 160 MG/1
320 TABLET ORAL DAILY
Status: DISCONTINUED | OUTPATIENT
Start: 2024-09-11 | End: 2024-09-10

## 2024-09-10 RX ORDER — HEPARIN SODIUM 1000 [USP'U]/ML
INJECTION, SOLUTION INTRAVENOUS; SUBCUTANEOUS
Status: DISCONTINUED | OUTPATIENT
Start: 2024-09-10 | End: 2024-09-10

## 2024-09-10 RX ORDER — PROTAMINE SULFATE 10 MG/ML
INJECTION, SOLUTION INTRAVENOUS
Status: DISCONTINUED | OUTPATIENT
Start: 2024-09-10 | End: 2024-09-10

## 2024-09-10 RX ORDER — FENTANYL CITRATE 50 UG/ML
INJECTION, SOLUTION INTRAMUSCULAR; INTRAVENOUS
Status: DISCONTINUED | OUTPATIENT
Start: 2024-09-10 | End: 2024-09-10

## 2024-09-10 RX ADMIN — HEPARIN SODIUM 13000 UNITS: 1000 INJECTION, SOLUTION INTRAVENOUS; SUBCUTANEOUS at 02:09

## 2024-09-10 RX ADMIN — VALSARTAN 160 MG: 160 TABLET, FILM COATED ORAL at 09:09

## 2024-09-10 RX ADMIN — QUETIAPINE FUMARATE 50 MG: 25 TABLET ORAL at 08:09

## 2024-09-10 RX ADMIN — FENTANYL CITRATE 50 MCG: 50 INJECTION, SOLUTION INTRAMUSCULAR; INTRAVENOUS at 01:09

## 2024-09-10 RX ADMIN — LIDOCAINE HYDROCHLORIDE 100 MG: 20 INJECTION, SOLUTION EPIDURAL; INFILTRATION; INTRACAUDAL; PERINEURAL at 03:09

## 2024-09-10 RX ADMIN — PROPOFOL 50 MCG/KG/MIN: 10 INJECTION, EMULSION INTRAVENOUS at 01:09

## 2024-09-10 RX ADMIN — PROTAMINE SULFATE 120 MG: 10 INJECTION, SOLUTION INTRAVENOUS at 03:09

## 2024-09-10 RX ADMIN — NOREPINEPHRINE BITARTRATE 0.02 MCG/KG/MIN: 1 INJECTION, SOLUTION, CONCENTRATE INTRAVENOUS at 02:09

## 2024-09-10 RX ADMIN — DIPHENHYDRAMINE HYDROCHLORIDE 50 MG: 50 CAPSULE ORAL at 11:09

## 2024-09-10 RX ADMIN — SODIUM CHLORIDE: 9 INJECTION, SOLUTION INTRAVENOUS at 11:09

## 2024-09-10 RX ADMIN — CEFAZOLIN 3 G: 330 INJECTION, POWDER, FOR SOLUTION INTRAMUSCULAR; INTRAVENOUS at 02:09

## 2024-09-10 RX ADMIN — ATORVASTATIN CALCIUM 40 MG: 20 TABLET, FILM COATED ORAL at 08:09

## 2024-09-10 NOTE — ANESTHESIA PROCEDURE NOTES
Arterial    Diagnosis: aortic stenosis    Patient location during procedure: done in OR    Staffing  Authorizing Provider: Aleksandr Hamilton MD  Performing Provider: Aleksandr Hamilton MD    Staffing  Performed by: Aleksandr Hamilton MD  Authorized by: Aleksandr Hamilton MD    Anesthesiologist was present at the time of the procedure.    Preanesthetic Checklist  Completed: patient identified, IV checked, site marked, risks and benefits discussed, surgical consent, monitors and equipment checked, pre-op evaluation, timeout performed and anesthesia consent givenArterial  Skin Prep: chlorhexidine gluconate and isopropyl alcohol  Location: radial    Catheter Size: 20 G  Catheter placement by Ultrasound guidance. Heme positive aspiration all ports.   Vessel Caliber: small, patent, compressibility normal  Vascular Doppler:  not done  Needle advanced into vessel with real time Ultrasound guidance.Insertion Attempts: 2  Assessment  Dressing: secured with tape and tegaderm  Patient: Tolerated well

## 2024-09-10 NOTE — NURSING TRANSFER
Nursing Transfer Note      9/10/2024   6:08 PM    Nurse giving handoff:terry shoemaker   Nurse receiving handoff:carlos manuel [facility] rn     Reason patient is being transferred: d/c critieria met     Transfer To: 311    Transfer via stretcher    Transfer with cardiac monitoring registered box and verified able to see patient on monitor     Transported by terry rn     Transfer Vital Signs:  Blood Pressure:see epic   Heart Rate:see epic   O2:room air   Temperature:see epic   Respirations:see epic     Telemetry: yes   Order for Tele Monitor? Yes     Additional Lines: tvp     4eyes on Skin: yes in recovery and upon arrival to [facility] went over tvp settings with carlos manuel and also showed her patient's groin and bilateral radial sites.     Medicines sent: normal saline at 10cc/hr via pump to [facility]     Any special needs or follow-up needed: went over vas. Band times. Also upon arrival to room assisted nurse getting patient up to recliner chair.     Patient belongings transferred with patient: Yes with belongings from [facility] with patient to [facility].     Chart send with patient: Yes    Notified: to room no complaints no distress noted.     Patient reassessed at: see epic  (date, time)  1  Upon arrival to floor: to room no complaints no distress noted.

## 2024-09-10 NOTE — ANESTHESIA PROCEDURE NOTES
[place]    Diagnosis: AS  Patient location during procedure: done in OR    Staffing  Authorizing Provider: Aleksandr Hamilton MD  Performing Provider: Merrick Medel DO    Staffing  Performed by: Merrick Medel DO  Authorized by: Aleksandr Hamilton MD    Anesthesiologist was present at the time of the procedure.  [place]  Skin Prep: chlorhexidine gluconate and isopropyl alcohol  Local Infiltration: lidocaine  Location: right,    Catheter Size: 6 Fr  Catheter placement by yes. Heme positive aspiration all ports.Insertion Attempts: 1  Indication: hemodynamic monitoring, transvenous pacing  Ultrasound Guidance  Needle advanced into vessel with real time Ultrasound guidance.  Image recorded and saved.  Assessment  Central Line Bundle Protocol followed. Hand hygiene before procedure, surgical cap worn, surgical mask worn, sterile surgical gloves worn, large sterile drape used.  Dressing: secured with tape and tegaderm, secured with tape, sutured in place and taped, tegaderm, steri-strips and dry sterile gauze  Patient: Tolerated Well

## 2024-09-10 NOTE — TRANSFER OF CARE
Anesthesia Transfer of Care Note    Patient: Ervin Hernandez    Procedure(s) Performed: Procedure(s) (LRB):  REPLACEMENT, AORTIC VALVE, TRANSCATHETER (TAVR) (N/A)  REPLACEMENT, AORTIC VALVE, TRANSCATHETER (TAVR)  Cardiac Cath Cosurgeon (N/A)  AORTOGRAM (N/A)    Patient location: PACU    Anesthesia Type: MAC    Transport from OR: Transported from OR on 6-10 L/min O2 by face mask with adequate spontaneous ventilation    Post pain: adequate analgesia    Post assessment: no apparent anesthetic complications and tolerated procedure well    Post vital signs: stable    Level of consciousness: awake, alert and oriented    Nausea/Vomiting: no nausea/vomiting    Complications: none    Transfer of care protocol was followed      Last vitals: There were no vitals taken for this visit.

## 2024-09-10 NOTE — INTERVAL H&P NOTE
ASSESSMENT & PLAN:   Diagnoses and all orders for this visit:  Right acute otitis media  -     amoxicillin-clavulanate (AUGMENTIN) 875-125 MG tablet; Take 1 tablet by mouth 2 times daily for 7 days  -     fluconazole (DIFLUCAN) 150 MG tablet; Take 1 tablet (150 mg) by mouth once for 1 dose May repeat dose 72 hours after first dose if still having symptoms.    Start Augmentin x7 days. Rx for Diflucan development of yeast infection which is happened to patient in the past. OTC analgesics as needed.     Return in about 5 days (around 1/26/2023) for persistent symptoms, sooner if needed.    There are no Patient Instructions on file for this visit.    At the end of the encounter, I discussed results, diagnosis, medications. Discussed red flags for immediate return to clinic/ER, as well as indications for follow up if no improvement. Patient and/or caregiver understood and agreed to plan. Patient was stable for discharge.    ------------------------------------------------------------------------  SUBJECTIVE  Patient presents with:  Urgent Care  Ear Problem: C/O ear pain for 1 day    HPI  Cathy Arroyo is a(n) 39 year old female presenting to clinic today for right ear pain that began this morning. Endorses decreased hearing. She tried using herbal swimmers ear drops this morning. She had a double ear infection 2 months ago which was treated with cefdinir. Denies any other cold symptoms.    Review of Systems   HENT: Positive for ear pain. Negative for congestion and sore throat.    Respiratory: Negative for cough.        Current Outpatient Medications   Medication Sig Dispense Refill     albuterol (PROAIR HFA/PROVENTIL HFA/VENTOLIN HFA) 108 (90 Base) MCG/ACT inhaler Inhale 2 puffs into the lungs every 6 hours 18 g 3     amoxicillin-clavulanate (AUGMENTIN) 875-125 MG tablet Take 1 tablet by mouth 2 times daily for 7 days 14 tablet 0     amphetamine-dextroamphetamine (ADDERALL XR) 20 MG 24 hr capsule Take 20 mg by mouth  The patient has been examined and the H&P has been reviewed:    I concur with the findings and no changes have occurred since H&P was written.    Procedure risks, benefits and alternative options discussed and understood by patient/family.          There are no hospital problems to display for this patient.     daily       ARIPiprazole (ABILIFY) 10 MG tablet Take 10 mg by mouth daily       cetirizine (ZYRTEC) 10 MG tablet Take 1 tablet (10 mg) by mouth daily 90 tablet 3     diazepam (VALIUM) 5 MG tablet Take 5 mg by mouth as needed       fluconazole (DIFLUCAN) 150 MG tablet Take 1 tablet (150 mg) by mouth once for 1 dose May repeat dose 72 hours after first dose if still having symptoms. 2 tablet 0     fluticasone (FLONASE) 50 MCG/ACT nasal spray Spray 1 spray into both nostrils daily 18.2 mL 0     fluticasone-salmeterol (ADVAIR HFA) 230-21 MCG/ACT inhaler Inhale 2 puffs into the lungs 2 times daily 12 g 11     ibuprofen (ADVIL/MOTRIN) 200 MG tablet Take 3 tablets (600 mg) by mouth 3 times daily for 7 days 63 tablet 0     ipratropium - albuterol 0.5 mg/2.5 mg/3 mL (DUONEB) 0.5-2.5 (3) MG/3ML neb solution Take 1 vial (3 mLs) by nebulization every 6 hours as needed for shortness of breath / dyspnea or wheezing 90 mL 1     levothyroxine (SYNTHROID/LEVOTHROID) 175 MCG tablet Take 1 tablet (175 mcg) by mouth daily 90 tablet 2     medical cannabis (Patient's own supply) Take 1 Dose by mouth See Admin Instructions (The purpose of this order is to document that the patient reports taking medical cannabis.  This is not a prescription, and is not used to certify that the patient has a qualifying medical condition.)    Tangerine Oral Suspension Unflavored 1-5 ml up to two times daily.  Total THC = 240 mg, Total CBD Trace       medical cannabis (Patient's own supply) Take 1 Dose by mouth See Admin Instructions (The purpose of this order is to document that the patient reports taking medical cannabis.  This is not a prescription, and is not used to certify that the patient has a qualifying medical condition.)    Sandy oral suspension: take 1-3 ml by mouth two times daily  Total CBD 1200 mg. Total THC 60 mg       methocarbamol (ROBAXIN) 500 MG tablet Take 2 tablets (1,000 mg) by mouth 3 times daily for 5 days 30 tablet 0      montelukast (SINGULAIR) 10 MG tablet Take 1 tablet (10 mg) by mouth every morning 90 tablet 3     multivitamin w/minerals (THERA-VIT-M) tablet Take 1 tablet by mouth daily       prochlorperazine (COMPAZINE) 10 MG tablet Take 1 tablet (10 mg) by mouth every 6 hours as needed for nausea or vomiting 10 tablet 3     rizatriptan (MAXALT-MLT) 10 MG ODT Take 1 tablet (10 mg) by mouth at onset of headache for migraine (repeat in 2 hours if needed) May repeat in 2 hours. Max 3 tablets/24 hours. 18 tablet 11     tiZANidine (ZANAFLEX) 4 MG tablet Take 1 tablet (4 mg) by mouth nightly as needed for muscle spasms 60 tablet 1     VENTOLIN  (90 Base) MCG/ACT inhaler INHALE 2 PUFFS INTO THE LUNGS EVERY 4 HOURS AS NEEDED FOR SHORTNESS OF BREATH OR DIFFICULT BREATHING OR WHEEZING 18 g 1     vitamin D3 (CHOLECALCIFEROL) 50 mcg (2000 units) tablet Take 1 tablet by mouth daily       Problem List:  2022: Bipolar affective disorder, currently manic mixed, moderate   (H)  2022: Neck pain  2022: Mid back pain  2021: Breast cancer screening, high risk patient  2020: Acute bilateral low back pain without sciatica  2020: Sacroiliitis (H)  2019: Lactating mother  2019: Dehydration  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  2019: Morbid obesity (H)  2019: Indication for care in labor and delivery, antepartum  2019: Labor and delivery indication for care or intervention  2019:  contractions  2019: Medical cannabis use  2019: Polyhydramnios affecting pregnancy  2019: Encounter for triage in pregnant patient  2019: Labor and delivery, indication for care  2019: Otitis externa  2019: Abnormal results of thyroid function studies  2019: Pain in symphysis pubis during pregnancy  2019: Post-surgical hypothyroidism  2018-10: Supervision of other normal pregnancy, antepartum  2018: Need for Tdap vaccination  2018: Nonintractable migraine, unspecified migraine type  2018: Moderate  persistent asthma with acute exacerbation  2017-10: History of Graves' disease  2017-06: Hypocalcemia  2017-06: S/P total thyroidectomy  2017-05: Abnormal cytology  2017-01: Mild persistent asthma with acute exacerbation  2017-01: Moderate persistent asthma without complication  2017-01: Chronic rhinitis  2017-01: Tobacco use disorder  2016-10: Pre-eclampsia  2016-10: Labor and delivery, indication for care  2016-09: GBS (group B Streptococcus carrier), +RV culture, currently   pregnant  2016-09: Encounter for triage in pregnant patient  2016-07: Amniotic fluid leaking  2016-05: Amniotic fluid leaking  2016-03: Bipolar affective disorder in remission (H)  2016-03: Pregnancy, normal first  2016-03: Ultrasound scan to confirm fetal viability with history of   miscarriage  2016-03: Need for Tdap vaccination  2016-02: Anti-TPO antibodies present  2016-02: Family planning  2016-01: Thyroid nodule  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     Codeine Sulfate Nausea and Vomiting     Nicoderm [Nicotine]      Patch and Gum, pt reported allergic reaction 6/24     Tegaderm Transparent Dressing (Informational Only) Hives     Tetanus Toxoid      Pt states she had an infection at injection site.      Vicodin [Hydrocodone-Acetaminophen] Nausea and Vomiting     Methimazole Rash         OBJECTIVE  Vitals:    01/21/23 0919   BP: 127/83   Pulse: 91   Temp: 98.1  F (36.7  C)   TempSrc: Tympanic   SpO2: 99%   Weight: 99.8 kg (220 lb)     Physical Exam   GENERAL: healthy, alert, no acute distress.   HEAD: normocephalic, atraumatic.  EYE: PERRL. EOMs  intact. No scleral injection bilaterally.   EAR: external ear normal. Bilateral ear canals normal and nonpainful. Right TM dull, bulging, erythematous. Left TM intact, pearly, translucent without bulging.  NOSE: external nose atraumatic without lesions.    No results found for any visits on 01/21/23.   Carac Counseling:  I discussed with the patient the risks of Carac including but not limited to erythema, scaling, itching, weeping, crusting, and pain.

## 2024-09-10 NOTE — BRIEF OP NOTE
Post Cath Note  Preoperative Diagnosis: Severe aortic stenosis [I35.0]   Postop Diagnosis: Severe aortic stenosis [I35.0]  Referring Physician: Logan Pham     Procedure: REPLACEMENT, AORTIC VALVE, TRANSCATHETER (TAVR) (N/A), REPLACEMENT, AORTIC VALVE, TRANSCATHETER (TAVR), Cardiac Cath Cosurgeon (N/A), AORTOGRAM (N/A)       Access: Right CFA and Left radial  : Logan Pham MD   All Operators: Surgeon(s):  Tony Manuel MD Singh, Saket, MD Subramaniam, Venkat, MD Tafur Soto, Jose D., MD     LVEDP 30 mmHg,    See full report for further details    Intervention:   - S/P Ligia TAVR with 23mm S3 +2    Treatments/Procedures: Procedure(s) (LRB):  REPLACEMENT, AORTIC VALVE, TRANSCATHETER (TAVR) (N/A)  REPLACEMENT, AORTIC VALVE, TRANSCATHETER (TAVR)  Cardiac Cath Cosurgeon (N/A)  AORTOGRAM (N/A)     -Closure device: Perclosure and radial band    Findings: Severe AS See catheterization report for full details.    Estimated Blood loss: 20 cc    Specimens removed: No  Post Cath Exam:   There were no vitals filed for this visit.  No unusual pain, hematoma, thrill or bruit at vascular access site.  Distal pulse present without signs of ischemia.    Recommendations:   - Patient tolerated procedure well. No immediate complications  - Routine post-cath care  - Continue aspirin   - EKG when arrives to floor  - Routine post cath protocol  - Maximize medical management  - Further care by the primary team   Continue medical management, Risk factor reduction, Follow-up with outpatient cardiologist    Milad Bryson - Pager# [phone]  9/10/2024  3:36 PM  Cardiovascular Fellow  [facility]

## 2024-09-10 NOTE — BRIEF OP NOTE
Brief Operative Note:    : Logan Pham MD     Referring Physician: Logan Pham     All Operators: Surgeon(s):  Tony Manuel MD Singh, Saket, MD Subramaniam, Venkat, MD Tafur Soto, Jose D., MD     Preoperative Diagnosis: Severe aortic stenosis [I35.0]     Postop Diagnosis: Severe aortic stenosis [I35.0]    Treatments/Procedures: Procedure(s) (LRB):  REPLACEMENT, AORTIC VALVE, TRANSCATHETER (TAVR) (N/A)  REPLACEMENT, AORTIC VALVE, TRANSCATHETER (TAVR)  Cardiac Cath Cosurgeon (N/A)  AORTOGRAM (N/A)    Findings:Severe structural disease is present. See catheterization report for full details.    -successful Ligia TAVR with a 23+1 S3 valve     Estimated Blood loss: 20 cc    Specimens removed: No    Recommendations:   - Routine post-cath care as per orders  - Continue medical management, Risk factor reduction      Tony Manuel

## 2024-09-10 NOTE — NURSING
Pt oriented to room and unit. Bed in lowest position with siderails up x2. Call bell within reach; pt instructed to call for assistance. Groin site and left wrist site checked. Pt updated with POC. Tele applied V/s taken. Frequencies continued. Will continue to monitor.

## 2024-09-10 NOTE — Clinical Note
The transvenous pacing lead was secured in place in the RV and was placed and capture was confirmed.

## 2024-09-10 NOTE — Clinical Note
The catheter was repositioned into the aorta. An angiography was performed of the aorta. The angiography was performed via power injection. The injected amount was 30 mL contrast at 20 mL/s. The PSI from the power injection was 800.

## 2024-09-10 NOTE — PROGRESS NOTES
Patient admitted to recovery see [facility] for complete assessment pacu bcg's maintained safety measures verified patient instructed on pain scale and patient verbalized understanding. Called for EKG and  when it was done it was placed in chart. Also patient doesn't want me to call anyone no family here with patient at this time. Also patient has vas. Band to left wrist cdi. Also right groin has gauze and tegaderm with some blood noted outlined no active bleeding noted. Patient has tvp to right IJ with cordis intact hooked up normal saline 10cc/hr via pump to cordis . Also it is a 6 Bengali line per anesthesia reporting.  . At 1615pm patient had to pee placed urinal for patient during this time see increase in b/p and once patient urinated it went down his b/p.  Also 445pm dr. Manuel came to see patient he did look at post op EKG. And I let him know about patient increase in b/p when he had to urinate and then it went back to 150-160's sbp dr. Manuel okay with that parameter.  He is okay with patient going to [facility].

## 2024-09-11 LAB
ANION GAP SERPL CALC-SCNC: 5 MMOL/L (ref 8–16)
BASOPHILS # BLD AUTO: 0.02 K/UL (ref 0–0.2)
BASOPHILS NFR BLD: 0.4 % (ref 0–1.9)
BUN SERPL-MCNC: 10 MG/DL (ref 8–23)
CALCIUM SERPL-MCNC: 8.7 MG/DL (ref 8.7–10.5)
CHLORIDE SERPL-SCNC: 109 MMOL/L (ref 95–110)
CO2 SERPL-SCNC: 27 MMOL/L (ref 23–29)
CREAT SERPL-MCNC: 0.9 MG/DL (ref 0.5–1.4)
DIFFERENTIAL METHOD BLD: ABNORMAL
EOSINOPHIL # BLD AUTO: 0.1 K/UL (ref 0–0.5)
EOSINOPHIL NFR BLD: 3 % (ref 0–8)
ERYTHROCYTE [DISTWIDTH] IN BLOOD BY AUTOMATED COUNT: 14.2 % (ref 11.5–14.5)
EST. GFR  (NO RACE VARIABLE): >60 ML/MIN/1.73 M^2
GLUCOSE SERPL-MCNC: 98 MG/DL (ref 70–110)
HCT VFR BLD AUTO: 34.6 % (ref 40–54)
HGB BLD-MCNC: 11.5 G/DL (ref 14–18)
IMM GRANULOCYTES # BLD AUTO: 0.02 K/UL (ref 0–0.04)
IMM GRANULOCYTES NFR BLD AUTO: 0.4 % (ref 0–0.5)
LYMPHOCYTES # BLD AUTO: 0.8 K/UL (ref 1–4.8)
LYMPHOCYTES NFR BLD: 17.1 % (ref 18–48)
MCH RBC QN AUTO: 30.2 PG (ref 27–31)
MCHC RBC AUTO-ENTMCNC: 33.2 G/DL (ref 32–36)
MCV RBC AUTO: 91 FL (ref 82–98)
MONOCYTES # BLD AUTO: 0.4 K/UL (ref 0.3–1)
MONOCYTES NFR BLD: 9.4 % (ref 4–15)
NEUTROPHILS # BLD AUTO: 3.3 K/UL (ref 1.8–7.7)
NEUTROPHILS NFR BLD: 69.7 % (ref 38–73)
NRBC BLD-RTO: 0 /100 WBC
OHS QRS DURATION: 130 MS
OHS QTC CALCULATION: 481 MS
PLATELET # BLD AUTO: 91 K/UL (ref 150–450)
PMV BLD AUTO: 8.5 FL (ref 9.2–12.9)
POCT GLUCOSE: 110 MG/DL (ref 70–110)
POCT GLUCOSE: 114 MG/DL (ref 70–110)
POCT GLUCOSE: 146 MG/DL (ref 70–110)
POCT GLUCOSE: 79 MG/DL (ref 70–110)
POTASSIUM SERPL-SCNC: 4.2 MMOL/L (ref 3.5–5.1)
RBC # BLD AUTO: 3.81 M/UL (ref 4.6–6.2)
SODIUM SERPL-SCNC: 141 MMOL/L (ref 136–145)
WBC # BLD AUTO: 4.67 K/UL (ref 3.9–12.7)

## 2024-09-11 PROCEDURE — 25000003 PHARM REV CODE 250: Performed by: STUDENT IN AN ORGANIZED HEALTH CARE EDUCATION/TRAINING PROGRAM

## 2024-09-11 PROCEDURE — 99233 SBSQ HOSP IP/OBS HIGH 50: CPT | Mod: ,,, | Performed by: INTERNAL MEDICINE

## 2024-09-11 PROCEDURE — 20600001 HC STEP DOWN PRIVATE ROOM

## 2024-09-11 PROCEDURE — 80048 BASIC METABOLIC PNL TOTAL CA: CPT | Performed by: STUDENT IN AN ORGANIZED HEALTH CARE EDUCATION/TRAINING PROGRAM

## 2024-09-11 PROCEDURE — 85025 COMPLETE CBC W/AUTO DIFF WBC: CPT | Performed by: STUDENT IN AN ORGANIZED HEALTH CARE EDUCATION/TRAINING PROGRAM

## 2024-09-11 PROCEDURE — 93005 ELECTROCARDIOGRAM TRACING: CPT

## 2024-09-11 PROCEDURE — 93010 ELECTROCARDIOGRAM REPORT: CPT | Mod: ,,, | Performed by: INTERNAL MEDICINE

## 2024-09-11 RX ADMIN — VALSARTAN 160 MG: 160 TABLET, FILM COATED ORAL at 08:09

## 2024-09-11 RX ADMIN — ASPIRIN 81 MG CHEWABLE TABLET 81 MG: 81 TABLET CHEWABLE at 08:09

## 2024-09-11 RX ADMIN — QUETIAPINE FUMARATE 50 MG: 25 TABLET ORAL at 08:09

## 2024-09-11 RX ADMIN — ATORVASTATIN CALCIUM 40 MG: 20 TABLET, FILM COATED ORAL at 08:09

## 2024-09-11 RX ADMIN — ATENOLOL 50 MG: 25 TABLET ORAL at 08:09

## 2024-09-11 NOTE — PROGRESS NOTES
[facility] - Cardiology Stepdown  Interventional Cardiology  Progress Note    Patient Name: Ervin Hernandez  MRN: [id number]  Admission Date: 9/10/2024  Hospital Length of Stay: 1 days  Code Status: Full Code   Attending Physician: Logan Pham MD   Primary Care Physician: Merary Phillip MD  Principal Problem:<principal problem not specified>    Subjective:     Interval History:  No events overnight.  Patient did not require any pacing overnight.  His EKG shows sinus rhythm with right bundle    Objective:     Vital Signs (Most Recent):  Temp: 99.2 °F (37.3 °C) (09/11/24 1105)  Pulse: 68 (09/11/24 1105)  Resp: 18 (09/11/24 1105)  BP: 137/64 (09/11/24 1105)  SpO2: 95 % (09/11/24 1105) Vital Signs (24h Range):  Temp:  [97.2 °F (36.2 °C)-99.2 °F (37.3 °C)] 99.2 °F (37.3 °C)  Pulse:  [58-95] 68  Resp:  [16-20] 18  SpO2:  [94 %-100 %] 95 %  BP: (137-191)/(60-86) 137/64  Arterial Line BP: (140-167)/(69-72) 167/69     Weight: 132.3 kg (291 lb 10.7 oz)  Body mass index is 43.07 kg/m².    SpO2: 95 %         Intake/Output Summary (Last 24 hours) at 9/11/2024 1139  Last data filed at 9/11/2024 0426  Gross per 24 hour   Intake 400 ml   Output 975 ml   Net -575 ml       Lines/Drains/Airways       Central Venous Catheter Line  Duration                  Percutaneous Central Line - Cordis 09/10/24 Internal Jugular Right 1 day    Pulmonary Artery Catheter Assessment  09/10/24 1537 <1 day              Peripheral Intravenous Line  Duration                  Peripheral IV - Single Lumen 09/10/24 1127 18 G Anterior;Distal;Left Upper Arm 1 day                     Physical Exam  Constitutional:       Appearance: Normal appearance.   HENT:      Head: Normocephalic and atraumatic.   Cardiovascular:      Rate and Rhythm: Normal rate and regular rhythm.      Pulses: Normal pulses.   Pulmonary:      Effort: Pulmonary effort is normal.      Breath sounds: Normal breath sounds.   Abdominal:      General: Abdomen is flat. Bowel  sounds are normal.      Palpations: Abdomen is soft.   Musculoskeletal:         General: Normal range of motion.      Cervical back: Normal range of motion.   Skin:     General: Skin is warm.      Capillary Refill: Capillary refill takes less than 2 seconds.   Neurological:      General: No focal deficit present.      Mental Status: He is alert and oriented to person, place, and time.            Significant Labs: All pertinent lab results from the last 24 hours have been reviewed.    Significant Imaging: EKG: NSR with RBBB  Assessment and Plan:     Patient is a 69 y.o. male presenting with:    Cardiac/Vascular      Aortic stenosis  S/P TAVR. Patient has RBBB pre and post TAVR. Didn't require any pacing overnight.   DC Temporary pacer. Plan to DC him tomorrow with event monitor       Hypercholesterolemia  Cont statins     Hypertension  Well controlled and cont with cont home medications         VTE Risk Mitigation (From admission, onward)      None            Jalil Coker MD  Interventional Cardiology  [facility] - Cardiology Stepdown      IC STAFF  I have seen the patient, reviewed the Fellow's history and physical, assessment and plan. I have personally interviewed and examined the patient and agree with the findings.     KIRBY Romo MD

## 2024-09-11 NOTE — SUBJECTIVE & OBJECTIVE
Interval History:  No events overnight.  Patient did not require any pacing overnight.  His EKG shows sinus rhythm with right bundle    Objective:     Vital Signs (Most Recent):  Temp: 99.2 °F (37.3 °C) (09/11/24 1105)  Pulse: 68 (09/11/24 1105)  Resp: 18 (09/11/24 1105)  BP: 137/64 (09/11/24 1105)  SpO2: 95 % (09/11/24 1105) Vital Signs (24h Range):  Temp:  [97.2 °F (36.2 °C)-99.2 °F (37.3 °C)] 99.2 °F (37.3 °C)  Pulse:  [58-95] 68  Resp:  [16-20] 18  SpO2:  [94 %-100 %] 95 %  BP: (137-191)/(60-86) 137/64  Arterial Line BP: (140-167)/(69-72) 167/69     Weight: 132.3 kg (291 lb 10.7 oz)  Body mass index is 43.07 kg/m².    SpO2: 95 %         Intake/Output Summary (Last 24 hours) at 9/11/2024 1139  Last data filed at 9/11/2024 0426  Gross per 24 hour   Intake 400 ml   Output 975 ml   Net -575 ml       Lines/Drains/Airways       Central Venous Catheter Line  Duration                  Percutaneous Central Line - Cordis 09/10/24 Internal Jugular Right 1 day    Pulmonary Artery Catheter Assessment  09/10/24 1537 <1 day              Peripheral Intravenous Line  Duration                  Peripheral IV - Single Lumen 09/10/24 1127 18 G Anterior;Distal;Left Upper Arm 1 day                     Physical Exam  Constitutional:       Appearance: Normal appearance.   HENT:      Head: Normocephalic and atraumatic.   Cardiovascular:      Rate and Rhythm: Normal rate and regular rhythm.      Pulses: Normal pulses.   Pulmonary:      Effort: Pulmonary effort is normal.      Breath sounds: Normal breath sounds.   Abdominal:      General: Abdomen is flat. Bowel sounds are normal.      Palpations: Abdomen is soft.   Musculoskeletal:         General: Normal range of motion.      Cervical back: Normal range of motion.   Skin:     General: Skin is warm.      Capillary Refill: Capillary refill takes less than 2 seconds.   Neurological:      General: No focal deficit present.      Mental Status: He is alert and oriented to person, place, and  time.            Significant Labs: All pertinent lab results from the last 24 hours have been reviewed.    Significant Imaging: EKG: NSR with RBBB

## 2024-09-11 NOTE — ASSESSMENT & PLAN NOTE
S/P TAVR. Patient has RBBB pre and post TAVR. Didn't require any pacing overnight.   DC Temporary pacer. Plan to DC him tomorrow with event monitor

## 2024-09-11 NOTE — PROGRESS NOTES
09/10/24 2055   Vital Signs   BP (!) 171/79   MAP (mmHg) 113   BP Location Right arm   BP Method Automatic   Patient Position Sitting     MD notified of BP remaining elevated and awaiting orders.  Orders pending.

## 2024-09-11 NOTE — PLAN OF CARE
Pt free of falls/trauma/injuries. AAOx4 VSS Denies c/o SOB; O2Sats remain stable on room air. SATs remain above 90%.  Incentive spirometry encouraged throughout shift. Generalized skin remains CDI; +2 edema noted to BLEs. TVP/ Cortis to RIJ removed by Mario Duron MD. Patient ambulated in varela with nurse w/ no complaints. Plan to continue with post-op care and discharge tomorrow w/ a holter monitor. Educated on fall precautions and use of call bell.  Pt tolerating plan of care.     Problem: Adult Inpatient Plan of Care  Goal: Plan of Care Review  Outcome: Progressing  Goal: Patient-Specific Goal (Individualized)  Outcome: Progressing  Goal: Absence of Hospital-Acquired Illness or Injury  Outcome: Progressing  Goal: Optimal Comfort and Wellbeing  Outcome: Progressing  Goal: Readiness for Transition of Care  Outcome: Progressing     Problem: Bariatric Environmental Safety  Goal: Safety Maintained with Care  Outcome: Progressing     Problem: Cardiac Rhythm Management Device  Goal: Optimal Adjustment to Device  Outcome: Progressing  Goal: Absence of Bleeding  Outcome: Progressing  Goal: Effective Device Function  Outcome: Progressing  Goal: Absence of Infection Signs and Symptoms  Outcome: Progressing  Goal: Acceptable Pain Level  Outcome: Progressing  Goal: Effective Oxygenation and Ventilation  Outcome: Progressing

## 2024-09-11 NOTE — PROGRESS NOTES
On call MD at bedside and notified of current BP.  Orders pending.   09/10/24 1913   Vital Signs   BP (!) 170/80   MAP (mmHg) 115   BP Location Right arm   Patient Position Sitting

## 2024-09-11 NOTE — NURSING
Patient with 12 beat run of VT noted per telemetry at 0528.  Patient asymptomatic.  MD notified.  No orders received at present.  Will continue to monitor.

## 2024-09-11 NOTE — PLAN OF CARE
Patient cooperative and pleasant with routine care and procedures.  Right groin site with old blood drainage noted.  Site soft with no hematoma.  Palpable peripheral pulses.  Fall precautions reviewed and patient verbalized understanding to only get out of bed with assistance from staff.  Bed alarm in use for patient safety with patient knowledge.  Resting quietly and without complaints.  Will continue to monitor.

## 2024-09-12 ENCOUNTER — TELEPHONE (OUTPATIENT)
Dept: CARDIOLOGY | Facility: CLINIC | Age: 69
End: 2024-09-12
Payer: MEDICARE

## 2024-09-12 ENCOUNTER — TELEPHONE (OUTPATIENT)
Dept: CARDIOTHORACIC SURGERY | Facility: CLINIC | Age: 69
End: 2024-09-12
Payer: MEDICARE

## 2024-09-12 ENCOUNTER — CLINICAL SUPPORT (OUTPATIENT)
Dept: CARDIOLOGY | Facility: HOSPITAL | Age: 69
End: 2024-09-12
Attending: INTERNAL MEDICINE
Payer: MEDICARE

## 2024-09-12 VITALS
BODY MASS INDEX: 43.2 KG/M2 | HEART RATE: 65 BPM | SYSTOLIC BLOOD PRESSURE: 142 MMHG | WEIGHT: 291.69 LBS | OXYGEN SATURATION: 95 % | RESPIRATION RATE: 18 BRPM | DIASTOLIC BLOOD PRESSURE: 70 MMHG | TEMPERATURE: 98 F | HEIGHT: 69 IN

## 2024-09-12 LAB
POC ACTIVATED CLOTTING TIME K: 140 SEC (ref 74–137)
POC ACTIVATED CLOTTING TIME K: 324 SEC (ref 74–137)
POCT GLUCOSE: 128 MG/DL (ref 70–110)
SAMPLE: ABNORMAL
SAMPLE: ABNORMAL

## 2024-09-12 PROCEDURE — 25000242 PHARM REV CODE 250 ALT 637 W/ HCPCS

## 2024-09-12 PROCEDURE — 93270 REMOTE 30 DAY ECG REV/REPORT: CPT

## 2024-09-12 PROCEDURE — 25000003 PHARM REV CODE 250: Performed by: STUDENT IN AN ORGANIZED HEALTH CARE EDUCATION/TRAINING PROGRAM

## 2024-09-12 PROCEDURE — 99239 HOSP IP/OBS DSCHRG MGMT >30: CPT | Mod: ,,, | Performed by: INTERNAL MEDICINE

## 2024-09-12 RX ORDER — AMLODIPINE BESYLATE 5 MG/1
5 TABLET ORAL DAILY
Qty: 90 TABLET | Refills: 3 | Status: SHIPPED | OUTPATIENT
Start: 2024-09-12 | End: 2025-09-12

## 2024-09-12 RX ORDER — FLUVOXAMINE MALEATE 100 MG/1
100 TABLET, COATED ORAL NIGHTLY
Status: DISCONTINUED | OUTPATIENT
Start: 2024-09-12 | End: 2024-09-12 | Stop reason: HOSPADM

## 2024-09-12 RX ADMIN — ATENOLOL 50 MG: 25 TABLET ORAL at 08:09

## 2024-09-12 RX ADMIN — QUETIAPINE FUMARATE 50 MG: 25 TABLET ORAL at 08:09

## 2024-09-12 RX ADMIN — ASPIRIN 81 MG CHEWABLE TABLET 81 MG: 81 TABLET CHEWABLE at 08:09

## 2024-09-12 RX ADMIN — VALSARTAN 160 MG: 160 TABLET, FILM COATED ORAL at 04:09

## 2024-09-12 RX ADMIN — FLUVOXAMINE MALEATE 100 MG: 100 TABLET ORAL at 04:09

## 2024-09-12 NOTE — TELEPHONE ENCOUNTER
Patient called.  Pt states he sees a [facility] doctor, Dr. Schulte.  He states he will follow up with him.

## 2024-09-12 NOTE — PLAN OF CARE
09/12/24 0935   Final Note   Assessment Type Final Discharge Note   Anticipated Discharge Disposition Home   What phone number can be called within the next 1-3 days to see how you are doing after discharge? [phone]   Hospital Resources/Appts/Education Provided Provided patient/caregiver with written discharge plan information;Provided education on problems/symptoms using teachback   Post-Acute Status   Discharge Delays None known at this time     Patient to discharge home / self care . To follow up with interventional cardiology and they schedule their own appointments. Per MD patient needs to follow up with general cardiology an in-basket message sent to our  CHW to schedule . No concerns voiced per patient . Has the [employer] monitor on . Stated his friend will come and pick him up.     Sundar Tierney RN    [phone]    Future Appointments   Date Time Provider Department Center   10/23/2024 12:00 PM LAB, APPOINTMENT [facility]   10/23/2024  1:00 PM ECHO, [facility]   10/23/2024  2:40 PM Logan Pham MD [facility]

## 2024-09-12 NOTE — DISCHARGE SUMMARY
[facility] - Cardiology Stepdown  Cardiology  Discharge Summary      Patient Name: Ervin Hernandez  MRN: [id number]  Admission Date: 9/10/2024  Hospital Length of Stay: 2 days  Discharge Date and Time:  09/12/2024 9:42 AM  Attending Physician: Lgoan Pham MD    Discharging Provider: Octavio Omalley MD  Primary Care Physician: Merary Phillip MD    HPI:   No notes on file    Procedure(s) (LRB):  REPLACEMENT, AORTIC VALVE, TRANSCATHETER (TAVR) (N/A)  REPLACEMENT, AORTIC VALVE, TRANSCATHETER (TAVR)  Cardiac Cath Cosurgeon (N/A)  AORTOGRAM (N/A)     Indwelling Lines/Drains at time of discharge:  Lines/Drains/Airways       None                   Hospital Course:  69M with PMHx of 29mm Evolut value (2021). He had a repeat TTE (2022) showing severe aortic stenosis. [place] [facility] [place] and dysp[place]. TAVR 9/10 with complications. Patient tolerated procedure well. Plan to discharge home with cardiac event monitor and follow-up with cardiology.     Goals of Care Treatment Preferences:  Code Status: Full Code      Consults:     Significant Diagnostic Studies: Labs: CMP   Recent Labs   Lab 09/11/24  0433      K 4.2      CO2 27   GLU 98   BUN 10   CREATININE 0.9   CALCIUM 8.7   ANIONGAP 5*    and CBC   Recent Labs   Lab 09/11/24  0433   WBC 4.67   HGB 11.5*   HCT 34.6*   PLT 91*       Pending Diagnostic Studies:       None            Final Active Diagnoses:    Diagnosis Date Noted POA    PRINCIPAL PROBLEM:  Aortic stenosis [I35.0] 09/05/2019 Yes    Hypertension [I10] 09/05/2019 Yes    Hypercholesterolemia [E78.00] 09/05/2019 Yes      Problems Resolved During this Admission:     Cardiac/Vascular  * Aortic stenosis  9/5/2019: Echo: Normal left ventricular size and systolic function. Mild LVH. Mildly dilated LA. Severe AS - 1.0 cm2 - 4.6 m/s - MG 52 mmHg. Patient has history of RBBB.    Patient had TAVR (9/10) without complications. Patient tolerated procedure. Patient has RBBB pre and  post TAVR. Didn't require any pacing overnight     Plan   - discharge with cardiac event monitor 30 days  - follow up with cardiology outpatient     Hypercholesterolemia  Plan  - continue home statin     Hypertension  Patient home medications atenolol and telmisartan.     Plan   - valsartan 160mg BID    - resume telmisartan 80   - d/c atenolol  - start amlodipine        Discharged Condition: stable    Disposition: Home or Self Care    Follow Up:    Patient Instructions:      Ambulatory referral/consult to Cardiology   Standing Status: Future   Referral Priority: Routine Referral Type: Consultation   Referral Reason: Specialty Services Required   Requested Specialty: Cardiology   Number of Visits Requested: 1     Medications:  Reconciled Home Medications:      Medication List        START taking these medications      amLODIPine 5 MG tablet  Commonly known as: NORVASC  Take 1 tablet (5 mg total) by mouth once daily.            CONTINUE taking these medications      ascorbic acid (vitamin C) 1000 MG tablet  Commonly known as: VITAMIN C  Take 1,000 mg by mouth once daily.     aspirin 81 MG Chew  Take 81 mg by mouth once daily.     atorvastatin 40 MG tablet  Commonly known as: LIPITOR  Take 40 mg by mouth every evening.     CENTRUM SILVER ULTRA MEN'S 956--300 mcg Tab  Generic drug: mv-min-folic-K1-lycopen-lutein  Take 1 tablet by mouth once daily.     co-enzyme Q-10 30 mg capsule  Take 100 mg by mouth once daily.     cyanocobalamin 500 MCG tablet  1 tablet Orally Once a day     fluvoxaMINE 100 MG tablet  Commonly known as: LUVOX  Take 100 mg by mouth every evening.     GARCINIA CAMBOGIA 200-500 mcg-mg Tab  Generic drug: chromium-brindal berry  Take 1 tablet by mouth Daily. Actually 900 mg     QUEtiapine 200 MG Tab  Commonly known as: SEROQUEL  Take 200 mg by mouth 2 (two) times daily.     telmisartan 80 MG Tab  Commonly known as: MICARDIS  TAKE 1 TABLET BY MOUTH EVERY DAY     vitamin D 1000 units Tab  Commonly  known as: VITAMIN D3  1 tablet Orally Once a day     zolpidem 10 mg Tab  Commonly known as: AMBIEN  Take 10 mg by mouth nightly as needed (1/2 as needed for sleep).            STOP taking these medications      atenoloL 50 MG tablet  Commonly known as: TENORMIN              Time spent on the discharge of patient: 60 minutes    Octavio Omalley MD  Cardiology  [facility] - Cardiology Stepdown    IC STAFF  I have seen the patient, reviewed the Fellow's history and physical, assessment and plan. I have personally interviewed and examined the patient and agree with the findings.     KIRBY Romo MD

## 2024-09-12 NOTE — NURSING
/79 , HR 73  in machine while sitting in chair. No complaints of pain. MD Caldwell made aware - okay to give Morning dose of Valsartan now.

## 2024-09-12 NOTE — PLAN OF CARE
10:24- CHW attempted to make f/u appointment with Dr. Wills. Message left for nurse regarding referral and f/u appointment. Nurse will reach out to patient to schedule appointment.        Juanita Mei W  Case Management  [phone]

## 2024-09-12 NOTE — ASSESSMENT & PLAN NOTE
9/5/2019: Echo: Normal left ventricular size and systolic function. Mild LVH. Mildly dilated LA. Severe AS - 1.0 cm2 - 4.6 m/s - MG 52 mmHg. Patient has history of RBBB.    Patient had TAVR (9/10) without complications. Patient tolerated procedure. Patient has RBBB pre and post TAVR. Didn't require any pacing overnight     Plan   - discharge with cardiac event monitor 30 days  - follow up with cardiology outpatient

## 2024-09-12 NOTE — PLAN OF CARE
[facility] - Cardiology Stepdown  Initial Discharge Assessment       Primary Care Provider: Merary Phillip MD    Admission Diagnosis: Severe aortic stenosis [I35.0]    Admission Date: 9/10/2024  Expected Discharge Date: 9/12/2024    Transition of Care Barriers: None    Payor: [employer] MCARE [facility] / Plan: [employer] CHOICES / Product Type: Medicare Advantage /     Extended Emergency Contact Information  Primary Emergency Contact: Andrew Hernandez  Mobile Phone: [phone]  Relation: Brother  Secondary Emergency Contact: claire hernandez   [place]  Mobile Phone: [phone]  Relation: Friend  Preferred language: English   needed? No    Discharge Plan A: Home  Discharge Plan B: Home      [id number] - [place], [place] - [address] [facility] [address]  [address] [address]  Phone: [phone] Fax: [phone]      Initial Assessment (most recent)       Adult Discharge Assessment - 09/12/24 0925          Discharge Assessment    Assessment Type Discharge Planning Assessment     Confirmed/corrected address, phone number and insurance Yes     Confirmed Demographics Correct on Facesheet     Source of Information patient     Communicated LENIN with patient/caregiver Date not available/Unable to determine     Reason For Admission severe aortic stenosis     People in Home alone     Facility Arrived From: home     Do you expect to return to your current living situation? Yes     Do you have help at home or someone to help you manage your care at home? No     Prior to hospitilization cognitive status: Alert/Oriented     Current cognitive status: Alert/Oriented     Walking or Climbing Stairs Difficulty no     Dressing/Bathing Difficulty no     Equipment Currently Used at Home cane, straight     Readmission within 30 days? No     Patient currently being followed by outpatient case management? No     Do you currently have  service(s) that help you manage your care at home? No     Do you take prescription medications? Yes     Do you have prescription coverage? Yes     Coverage PHN     Do you have any problems affording any of your prescribed medications? No     Is the patient taking medications as prescribed? yes     Who is going to help you get home at discharge? stated a friend will come and get him .     How do you get to doctors appointments? public transportation     Are you on dialysis? No     Do you take coumadin? No   ASA    Discharge Plan A Home     Discharge Plan B Home     DME Needed Upon Discharge  none     Discharge Plan discussed with: Patient     Transition of Care Barriers None        Physical Activity    On average, how many days per week do you engage in moderate to strenuous exercise (like a brisk walk)? 0 days     On average, how many minutes do you engage in exercise at this level? 0 min        Financial Resource Strain    How hard is it for you to pay for the very basics like food, housing, medical care, and heating? Not hard at all        Housing Stability    In the last 12 months, was there a time when you were not able to pay the mortgage or rent on time? No     At any time in the past 12 months, were you homeless or living in a shelter (including now)? No        Transportation Needs    Has the lack of transportation kept you from medical appointments, meetings, work or from getting things needed for daily living? No        Food Insecurity    Within the past 12 months, you worried that your food would run out before you got the money to buy more. Never true     Within the past 12 months, the food you bought just didn't last and you didn't have money to get more. Never true        Stress    Do you feel stress - tense, restless, nervous, or anxious, or unable to sleep at night because your mind is troubled all the time - these days? Rather much        Social Isolation    How often do you feel lonely or isolated  from those around you?  Sometimes        Alcohol Use    Q1: How often do you have a drink containing alcohol? Never     Q2: How many drinks containing alcohol do you have on a typical day when you are drinking? Patient does not drink     Q3: How often do you have six or more drinks on one occasion? Never        Utilities    In the past 12 months has the electric, gas, oil, or water company threatened to shut off services in your home? No        Health Literacy    How often do you need to have someone help you when you read instructions, pamphlets, or other written material from your doctor or pharmacy? Never                        CM met with the patient at the bedside and discussed the discharge plan. Gave him the discharge booklet and placed contact numbers on the white board in the room. Patient alert and sitting up in chair and dressed and ready to go home. Has received the [employer] monitor and it is on him.  Patient verified his name , , PCP, Insurance and Pharmacy . Patient verified his physical address as [address] [place] [address] [address] [facility] [address] [place] [address]. Stated he lives alone in a trailer  and has 2 steps to point of entry . Stated he is not on coumadin (on ASA) nor is he a dialysis patient , nor has he had home health. DME's include:straight cane.  Stated he takes  medication as prescribed and has no problems getting  medication. Stated once he is discharged he has a friend he will call to get him and bring him home.     Discharge Plan A and Plan B have been determined by review of patient's clinical status, future medical and therapeutic needs, and coverage/benefits for post-acute care in coordination with multidisciplinary team members.    Sundar Tierney RN         [phone]

## 2024-09-12 NOTE — NURSING
Notified Manfred.MD at bedside pt had +2 edema at BLE, and manual /78, and Manfred ELLIS assessed pt.

## 2024-09-12 NOTE — HOSPITAL COURSE
69M with PMHx of 29mm Evolut value (2021). He had a repeat TTE (2022) showing severe aortic stenosis. [place] [facility] [place] and [place]. TAVR 9/10 with complications. Patient tolerated procedure well. Plan to discharge home with cardiac event monitor and follow-up with cardiology.

## 2024-09-12 NOTE — ASSESSMENT & PLAN NOTE
Patient home medications atenolol and telmisartan.     Plan   - valsartan 160mg BID    - resume telmisartan 80   - d/c atenolol  - start amlodipine

## 2024-09-12 NOTE — TELEPHONE ENCOUNTER
----- Message from Cynthia Anselmo sent at 9/12/2024 10:26 AM CDT -----  Regarding: APPT  Contact: pt@ [phone]  Pt is calling asking asking to speak with someone in the office to schedule an appt. Pt was just recently in the ER on 9/12  and is needing to be seen soon. No available appts in Epic for the time frame pt is needing to be seen, according to their discharge paper work. Please call pt to schedule. pt@ [phone].        When was patient seen in the ER / discharged?:9/12     When does the patient need to be seen? Within what time frame?ASAP     Additional Information: pt@ [phone]

## 2024-09-12 NOTE — PLAN OF CARE
Problem: Wound  Goal: Absence of Infection Signs and Symptoms  Intervention: Prevent or Manage Infection  Flowsheets (Taken 9/12/2024 0332)  Fever Reduction/Comfort Measures: lightweight clothing  Isolation Precautions: precautions maintained  Goal: Improved Oral Intake  Outcome: Progressing  Intervention: Promote and Optimize Oral Intake  Flowsheets (Taken 9/12/2024 0332)  Oral Nutrition Promotion:   rest periods promoted   physical activity promoted  Goal: Optimal Pain Control and Function  Outcome: Progressing  Intervention: Prevent or Manage Pain  Flowsheets (Taken 9/12/2024 0332)  Sleep/Rest Enhancement:   awakenings minimized   regular sleep/rest pattern promoted  Pain Management Interventions: diversional activity provided     Problem: Fall Injury Risk  Goal: Absence of Fall and Fall-Related Injury  Outcome: Progressing  Intervention: Identify and Manage Contributors  Flowsheets (Taken 9/12/2024 0332)  Self-Care Promotion:   independence encouraged   BADL personal objects within reach  Medication Review/Management: medications reviewed  Intervention: Promote Injury-Free Environment  Flowsheets (Taken 9/12/2024 0332)  Safety Promotion/Fall Prevention:   assistive device/personal item within reach   room near unit station   commode/urinal/bedpan at bedside   instructed to call staff for mobility   high risk medications identified   side rails raised x 2

## 2024-09-13 NOTE — ANESTHESIA POSTPROCEDURE EVALUATION
Anesthesia Post Evaluation    Patient: Ervin Hernandez    Procedure(s) Performed: Procedure(s) (LRB):  REPLACEMENT, AORTIC VALVE, TRANSCATHETER (TAVR) (N/A)  REPLACEMENT, AORTIC VALVE, TRANSCATHETER (TAVR)  Cardiac Cath Cosurgeon (N/A)  AORTOGRAM (N/A)    Final Anesthesia Type: general      Patient location during evaluation: PACU  Patient participation: Yes- Able to Participate  Level of consciousness: awake and alert and oriented  Post-procedure vital signs: reviewed and stable  Pain management: adequate  Airway patency: patent  ABDULAZIZ mitigation strategies: Intraoperative administration of CPAP, nasopharyngeal airway, or oral appliance during sedation and Multimodal analgesia  PONV status at discharge: No PONV  Anesthetic complications: no      Cardiovascular status: blood pressure returned to baseline and hemodynamically stable  Respiratory status: unassisted  Hydration status: euvolemic  Follow-up not needed.              Vitals Value Taken Time   /70 09/12/24 1014   Temp 36.9 °C (98.4 °F) 09/12/24 1014   Pulse 65 09/12/24 1014   Resp 18 09/12/24 1014   SpO2 95 % 09/12/24 1014         No case tracking events are documented in the log.      Pain/Vianca Score: No data recorded

## 2024-09-16 NOTE — OP NOTE
DATE OF PROCEDURE: 09/10/2024    ATTENDING SURGEONS: Logan Weiss MD and Nahum Montana M.D.    PREOPERATIVE DIAGNOSES:  1. Severe aortic stenosis.    POSTOPERATIVE DIAGNOSES:  1. Severe aortic stenosis      OPERATION PERFORMED: Transcatheter aortic valve insertion via transfemoral approach using a 23+1 S3 valve .    ANESTHESIA: MAC    ESTIMATED BLOOD LOSS: 20 mL.    BRIEF HISTORY: This patient is a 69-year-old man with severe TAVR valve aortic stenosis. The patient has had progressive dyspnea on exertion. This prompted a thoughtful and thorough evaluation, which demonstrated severe aortic stenosis. Given the comorbid conditions, and the patients preference, a transcatheter valve insertion was recommended. The patient now presents for transcatheter aortic valve insertion.    PROCEDURE: After obtaining informed and written consent, the patient was brought to the cath lab and placed on the cath table in supine position. All the pressure points were protected. After induction of adequate anesthesia, a transvenous pacing wire was placed by anesthesia. Femoral vascular access was obtained.  A wire was advanced across the aortic valve. It was exchanged for stiff wire and a 23+1 S3 valve  valve was advanced to the level of the aortic annulus. Once the team was satisfied that the valve was in proper position, it was deployed under rapid pacing. Excellent positioning was confirmed on echo and fluoro/angio.  Post-procedure echo demonstrated no aortic insufficiency. The femoral access sites were controlled using percutaneous techniques. The patient tolerated the procedure well, there were no complications.   At the conclusion of the case, sponge and instrument counts were correct.    @Nahum Montana MD  Cardiac Surgery@

## 2024-09-19 LAB
OHS QRS DURATION: 134 MS
OHS QTC CALCULATION: 462 MS

## 2024-10-23 ENCOUNTER — HOSPITAL ENCOUNTER (OUTPATIENT)
Dept: CARDIOLOGY | Facility: HOSPITAL | Age: 69
Discharge: HOME | End: 2024-10-23
Attending: INTERNAL MEDICINE
Payer: MEDICARE

## 2024-10-23 ENCOUNTER — OFFICE VISIT (OUTPATIENT)
Dept: CARDIOLOGY | Facility: CLINIC | Age: 69
End: 2024-10-23
Payer: MEDICARE

## 2024-10-23 VITALS
WEIGHT: 291 LBS | BODY MASS INDEX: 43.1 KG/M2 | DIASTOLIC BLOOD PRESSURE: 80 MMHG | HEIGHT: 69 IN | OXYGEN SATURATION: 98 % | HEART RATE: 71 BPM | SYSTOLIC BLOOD PRESSURE: 183 MMHG

## 2024-10-23 VITALS
HEIGHT: 69 IN | HEART RATE: 75 BPM | SYSTOLIC BLOOD PRESSURE: 138 MMHG | BODY MASS INDEX: 43.1 KG/M2 | WEIGHT: 291 LBS | DIASTOLIC BLOOD PRESSURE: 78 MMHG

## 2024-10-23 DIAGNOSIS — I35.0 NONRHEUMATIC AORTIC VALVE STENOSIS: ICD-10-CM

## 2024-10-23 DIAGNOSIS — Z95.2 S/P TAVR (TRANSCATHETER AORTIC VALVE REPLACEMENT): Primary | ICD-10-CM

## 2024-10-23 LAB
ASCENDING AORTA: 3.67 CM
AV AREA BY CONTINUOUS VTI: 1.6 CM2
AV INDEX (PROSTH): 0.5
AV LVOT MEAN GRADIENT: 6 MMHG
AV LVOT PEAK GRADIENT: 11 MMHG
AV MEAN GRADIENT: 40 MMHG
AV PEAK GRADIENT: 64 MMHG
AV VALVE AREA BY VELOCITY RATIO: 1.8 CM²
AV VALVE AREA: 2.1 CM²
AV VELOCITY RATIO: 0.43
BSA FOR ECHO PROCEDURE: 2.53 M2
CV ECHO LV RWT: 0.38 CM
DOP CALC AO PEAK VEL: 4 M/S
DOP CALC AO VTI: 70 CM
DOP CALC LVOT AREA: 4.2 CM2
DOP CALC LVOT DIAMETER: 2.3 CM
DOP CALC LVOT PEAK VEL: 1.7 M/S
DOP CALC LVOT STROKE VOLUME: 145.8 CM3
DOP CALCLVOT PEAK VEL VTI: 35.1 CM
E WAVE DECELERATION TIME: 307.29 MS
E/A RATIO: 0.64
E/E' RATIO: 6.94 M/S
ECHO EF ESTIMATED: 63 %
ECHO LV POSTERIOR WALL: 1 CM (ref 0.6–1.1)
FRACTIONAL SHORTENING: 34.6 % (ref 28–44)
INTERVENTRICULAR SEPTUM: 1.2 CM (ref 0.6–1.1)
IVC DIAMETER: 2.35 CM
IVRT: 71.36 MS
LA MAJOR: 6.01 CM
LA MINOR: 5.97 CM
LA WIDTH: 4.16 CM
LEFT ATRIUM SIZE: 4.39 CM
LEFT ATRIUM VOLUME INDEX MOD: 39.7 ML/M2
LEFT ATRIUM VOLUME INDEX: 38.4 ML/M2
LEFT ATRIUM VOLUME MOD: 96 ML
LEFT ATRIUM VOLUME: 92.98 CM3
LEFT INTERNAL DIMENSION IN SYSTOLE: 3.4 CM (ref 2.1–4)
LEFT VENTRICLE DIASTOLIC VOLUME INDEX: 52.32 ML/M2
LEFT VENTRICLE DIASTOLIC VOLUME: 126.61 ML
LEFT VENTRICLE MASS INDEX: 91.2 G/M2
LEFT VENTRICLE SYSTOLIC VOLUME INDEX: 19.3 ML/M2
LEFT VENTRICLE SYSTOLIC VOLUME: 46.59 ML
LEFT VENTRICULAR INTERNAL DIMENSION IN DIASTOLE: 5.2 CM (ref 3.5–6)
LEFT VENTRICULAR MASS: 220.8 G
LV LATERAL E/E' RATIO: 5.9
LV SEPTAL E/E' RATIO: 8.43
MV A" WAVE DURATION": 234.06 MS
MV PEAK A VEL: 0.92 M/S
MV PEAK E VEL: 0.59 M/S
OHS CV RV/LV RATIO: 0.67 CM
PISA TR MAX VEL: 2.05 M/S
PULM VEIN A" WAVE DURATION": 234.06 MS
PULM VEIN S/D RATIO: 1.35
PULMONIC VEIN PEAK A VELOCITY: 0.2 M/S
PV PEAK D VEL: 0.34 M/S
PV PEAK S VEL: 0.46 M/S
RA MAJOR: 4.85 CM
RA PRESSURE ESTIMATED: 3 MMHG
RA WIDTH: 3.49 CM
RIGHT ATRIAL AREA: 17.4 CM2
RIGHT VENTRICLE DIASTOLIC BASEL DIMENSION: 3.5 CM
RV TB RVSP: 5 MMHG
RV TISSUE DOPPLER FREE WALL SYSTOLIC VELOCITY 1 (APICAL 4 CHAMBER VIEW): 16.49 CM/S
SINUS: 3.48 CM
STJ: 2.94 CM
TDI LATERAL: 0.1 M/S
TDI SEPTAL: 0.07 M/S
TDI: 0.09 M/S
TR MAX PG: 17 MMHG
TV PEAK GRADIENT: 17 MMHG
TV REST PULMONARY ARTERY PRESSURE: 20 MMHG
Z-SCORE OF LEFT VENTRICULAR DIMENSION IN END DIASTOLE: -7.89
Z-SCORE OF LEFT VENTRICULAR DIMENSION IN END SYSTOLE: -5.52

## 2024-10-23 PROCEDURE — 4010F ACE/ARB THERAPY RXD/TAKEN: CPT | Mod: CPTII,S$GLB,, | Performed by: INTERNAL MEDICINE

## 2024-10-23 PROCEDURE — 3288F FALL RISK ASSESSMENT DOCD: CPT | Mod: CPTII,S$GLB,, | Performed by: INTERNAL MEDICINE

## 2024-10-23 PROCEDURE — 99214 OFFICE O/P EST MOD 30 MIN: CPT | Mod: S$GLB,,, | Performed by: INTERNAL MEDICINE

## 2024-10-23 PROCEDURE — 93306 TTE W/DOPPLER COMPLETE: CPT | Mod: 26,,, | Performed by: INTERNAL MEDICINE

## 2024-10-23 PROCEDURE — 1126F AMNT PAIN NOTED NONE PRSNT: CPT | Mod: CPTII,S$GLB,, | Performed by: INTERNAL MEDICINE

## 2024-10-23 PROCEDURE — 1100F PTFALLS ASSESS-DOCD GE2>/YR: CPT | Mod: CPTII,S$GLB,, | Performed by: INTERNAL MEDICINE

## 2024-10-23 PROCEDURE — 3079F DIAST BP 80-89 MM HG: CPT | Mod: CPTII,S$GLB,, | Performed by: INTERNAL MEDICINE

## 2024-10-23 PROCEDURE — 93306 TTE W/DOPPLER COMPLETE: CPT

## 2024-10-23 PROCEDURE — 3008F BODY MASS INDEX DOCD: CPT | Mod: CPTII,S$GLB,, | Performed by: INTERNAL MEDICINE

## 2024-10-23 PROCEDURE — 1159F MED LIST DOCD IN RCRD: CPT | Mod: CPTII,S$GLB,, | Performed by: INTERNAL MEDICINE

## 2024-10-23 PROCEDURE — 3077F SYST BP >= 140 MM HG: CPT | Mod: CPTII,S$GLB,, | Performed by: INTERNAL MEDICINE

## 2024-10-23 PROCEDURE — 99999 PR PBB SHADOW E&M-EST. PATIENT-LVL IV: CPT | Mod: PBBFAC,,, | Performed by: INTERNAL MEDICINE

## 2024-10-23 RX ORDER — DILTIAZEM HYDROCHLORIDE 180 MG/1
180 CAPSULE, COATED, EXTENDED RELEASE ORAL DAILY
COMMUNITY

## 2024-10-23 NOTE — PROGRESS NOTES
"    Interventional Cardiology Clinic Note  Reason for Visit: Routine follow up  - POST TAVR (1 month)     HPI:   70y/o M with PMHx of HTN, HLD, Thrombocytopenia, He had a 29mm Evolut valve placed in 2021 and initially felt well. However, on repeat TTE in 2022 he was found to have severe bioprosthetic ASAS s/p TAVR 9/12/24. Presented to the clinic for routine follow. Upon encounter pt reported significant improvement in sob. Improvement in ADLs. Patient denies chest pain with exertion or at rest, palpitations, shortness of breath, dyspnea on exertion, dizziness, syncope, edema, orthopnea, paroxysmal nocturnal dyspnea, or claudication.    ROS:    Constitution: Negative for fever, chills, weight loss or gain.   HENT: Negative for sore throat, rhinorrhea, or headache.  Eyes: Negative for blurred or double vision.   Cardiovascular: See above  Pulmonary: Negative for SOB   Gastrointestinal: Negative for abdominal pain, nausea, vomiting, or diarrhea.   : Negative for dysuria.   Neurological: Negative for focal weakness or sensory changes.  PMH:     Past Medical History:   Diagnosis Date    Hypercholesteremia     Hypercholesterolemia 9/5/2019 1995: Began statin.    Hypertension     Hypertension 9/5/2019 1995: Diagnosed.    Kidney stones     Mitral valve prolapse 9/5/2019 1995: Diagnosed.    Morbid obesity 9/5/2019 9/5/2019: Weight 127 kg. BMI 41.    OCD (obsessive compulsive disorder)        Allergies:     Review of patient's allergies indicates:   Allergen Reactions    Sulfa (sulfonamide antibiotics) Other (See Comments)     bleeding       Social History:     Social History     Tobacco Use    Smoking status: Never    Smokeless tobacco: Never   Substance Use Topics    Alcohol use: Not Currently     Comment: 3 beers occ       Physical Exam:   BP (!) 183/80 (BP Location: Right arm, Patient Position: Sitting)   Pulse 71   Ht 5' 9" (1.753 m)   Wt 132 kg (291 lb 0.1 oz)   SpO2 98%   BMI 42.97 kg/m²    Wt " Readings from Last 4 Encounters:   10/23/24 132 kg (291 lb 0.1 oz)   10/23/24 132 kg (291 lb)   09/10/24 132.3 kg (291 lb 10.7 oz)   24 135.2 kg (298 lb 2.8 oz)       Physical Exam   Constitutional: No distress, obese, conversant  HEENT: Sclera anicteric, PERRLA  Neck: No JVD, no masses, good movement  CV: RRR, S1 and S2 normal, no additional heart sounds or murmurs. Pulses 2+ and equal bilaterally in radial arteries and femoral, DP and PT areas bilaterally  Pulm: Clear to auscultation bilaterally with symmetrical expansion.   GI: Abdomen soft, non-tender, good bowel sounds  Extremities: Both extremities intact and grossly normal, skin is warm, no edema noted  Skin: No ecchymosis, erythema, or ulcers  Psych: AOx3, appropriate affect    Labs:     Lab Results   Component Value Date     2024    K 4.2 2024     2024    CO2 27 2024    BUN 10 2024    CREATININE 1.1 10/23/2024    GLUCOSE 100 (H) 2022    ANIONGAP 5 (L) 2024     Lab Results   Component Value Date     (H) 09/10/2024    Lab Results   Component Value Date    WBC 4.48 10/23/2024    HGB 13.1 (L) 10/23/2024    HCT 39.3 (L) 10/23/2024     (L) 10/23/2024    GRAN 3.3 2024    GRAN 69.7 2024            Imaging:     EF   Date Value Ref Range Status   2024 65 % Final   2022 65 % Final       [id number] RBBB    TTE 24:     Left Ventricle: The left ventricle is normal in size. Normal wall thickness. There is hyperdynamic systolic function with a visually estimated ejection fraction of 70 - 75%. There is normal diastolic function.    Right Ventricle: Normal right ventricular cavity size. Wall thickness is normal. Systolic function is normal.    Left Atrium: Left atrium is mildly dilated.    Aortic Valve: There is a transcatheter valve replacement in the aortic position. It is reported to be a 23 mm Ham Rasta S3 valve. There is normal leaflet mobility. Aortic valve area  by VTI is 2.1 cm². Aortic valve peak velocity is 4.0 m/s. Mean gradient is 40.0 mmHg. The dimensionless index is 0.50.  AcT is <100ms and AcT/ET ratio <30. This is sugestive of mismatch rather than true stensois. Clinical correlation is required. There is no significant regurgitation.    S/p 23 mm Rasta S3 valve inside failed 29 mm Evolut valve.    Tricuspid Valve: There is mild regurgitation.    Pulmonary Artery: The estimated pulmonary artery systolic pressure is 20 mmHg.    IVC/SVC: Normal venous pressure at 3 mmHg.           Assessment and Plan:     S/P TAVR (transcatheter aortic valve replacement)  Pt with Hx of AS   - S/p Successful transfemoral aortic valve replacement with a 23 mm Rasta S3 valve inside failed 29 mm Evolut valve.   - ECHO: Aortic Valve: There is a transcatheter valve replacement in the aortic position. It is reported to be a 23 mm Ham Rasta S3 valve. There is normal leaflet mobility. Aortic valve area by VTI is 2.1 cm². Aortic valve peak velocity is 4.0 m/s. Mean gradient is 40.0 mmHg. The dimensionless index is 0.50.  AcT is <100ms and AcT/ET ratio <30. This is sugestive of mismatch rather than true stensois. Clinical correlation is required. There is no significant regurgitation. S/p 23 mm Rasta S3 valve inside failed 29 mm Evolut valve.  - NYHA Class: I - Significant improvement   - CCS: Grade 0      Recommendations   - Cont. ASA 81 mg and Diltiazem 180 mg   - Advise diet and exercise as tolerated   - Follow up in 1 yr         Keep a home BP diary and bring to next visit  Discussed mediterranean diet  Discussed exercise therapy based on 150-min per week AHA recommendations for moderate intensity exercise/75 min for high-intensity exercise    Signed:  Andres Toscano Obi, M.D.  Cardiology Fellow  [facility]  10/23/2024 2:40 PM

## 2024-10-23 NOTE — ASSESSMENT & PLAN NOTE
Pt with Hx of AS   - S/p Successful transfemoral aortic valve replacement with a 23 mm Rasta S3 valve inside failed 29 mm Evolut valve.   - ECHO: Aortic Valve: There is a transcatheter valve replacement in the aortic position. It is reported to be a 23 mm Ham Rasta S3 valve. There is normal leaflet mobility. Aortic valve area by VTI is 2.1 cm². Aortic valve peak velocity is 4.0 m/s. Mean gradient is 40.0 mmHg. The dimensionless index is 0.50.  AcT is <100ms and AcT/ET ratio <30. This is sugestive of mismatch rather than true stensois. Clinical correlation is required. There is no significant regurgitation. S/p 23 mm Rasta S3 valve inside failed 29 mm Evolut valve.  - NYHA Class I - Significant improvement   - CCS grade 0     Recommendations   - Cont. ASA 81 mg and Diltiazem 180 mg   - Advise diet and exercise as tolerated

## (undated) DEVICE — SPIKE CONTRAST CONTROLLER

## (undated) DEVICE — KIT GLIDESHEATH SLEND 6FR 10CM

## (undated) DEVICE — KIT MICROINTRO 4F .018X40X7CM

## (undated) DEVICE — CATH DXTERITY AL20 100CM 6FR

## (undated) DEVICE — TRAY CATH LAB OMC

## (undated) DEVICE — GUIDEWIRE CONFIDA BECKER CURVE

## (undated) DEVICE — GUIDEWIRE EMERALD 150CM PTFE

## (undated) DEVICE — BOWL STERILE LARGE 32OZ

## (undated) DEVICE — DRAPE ANGIO BRACH 38X44IN

## (undated) DEVICE — CATH MPA2 INFINITI 4FR 100CM

## (undated) DEVICE — GUIDEWIRE X SPORT .014IN 190CM

## (undated) DEVICE — STOPCOCK 3-WAY

## (undated) DEVICE — KIT PERCUTANEOUS SHEATH

## (undated) DEVICE — SYS DEL CRITIKIT INJECTATE

## (undated) DEVICE — FLEXSHEATH DRYSEAL 14FR 33CM

## (undated) DEVICE — CATH SWAN GANZ STND 7FR

## (undated) DEVICE — SYR 10ML LIDOCAINE

## (undated) DEVICE — TUBING PRSS MON M/M LL 72IN

## (undated) DEVICE — SYS EVOLUT PRO+ DELIVERY 23-29
Type: IMPLANTABLE DEVICE | Site: HEART | Status: NON-FUNCTIONAL
Removed: 2021-01-19

## (undated) DEVICE — SYR MED RAD 150ML

## (undated) DEVICE — CATH TEMP PACER 5.0FR

## (undated) DEVICE — CATH DXTERITY PG145 110CM 6FR

## (undated) DEVICE — NDL PERC ENTRY BSDN 18-7.0

## (undated) DEVICE — CATH DXTERITY JR40 100CM 6FR

## (undated) DEVICE — GLOVE BIOGEL SKINSENSE PI 8.5

## (undated) DEVICE — CATH DXTERITY IMA 100CM 6FR

## (undated) DEVICE — DEVICE PERCLOSE SUT CLSR 6FR

## (undated) DEVICE — CATH ANGIO EXPO VENT PIGTL 6FR

## (undated) DEVICE — SPIKE SHORT LG BORE 1-WAY 2IN

## (undated) DEVICE — GUIDEWIRE STF .035X260CM STR

## (undated) DEVICE — SHEATH INTRODUCER 8FR 11CM

## (undated) DEVICE — TUBING HPCIL ROT M/F ADPT 10IN

## (undated) DEVICE — DEVICE MYNX GRIP 6/7F

## (undated) DEVICE — CATH DXTERITY JL40 100CM 6FR

## (undated) DEVICE — KIT MOUNT POLE CBL PGTL 24IN

## (undated) DEVICE — LINE 60IN PRESSURE MON.

## (undated) DEVICE — COVER TABLE 44X90 STERILE

## (undated) DEVICE — TUBING PRESS MONITOR 6 STER

## (undated) DEVICE — CATH IMPULSE D6F AL2 5PK

## (undated) DEVICE — PROTECTION STATION PLUS

## (undated) DEVICE — INTRODUCER CATH 7F 11CML

## (undated) DEVICE — KIT CUSTOM MANIFOLD

## (undated) DEVICE — TUBE CONTRAST SQUEEZE

## (undated) DEVICE — SHEATH PINNACLE 8FR

## (undated) DEVICE — OMNIPAQUE 350MG 150ML VIAL

## (undated) DEVICE — HEMOSTAT VASC BAND LONG 27CM

## (undated) DEVICE — CATH DXTERITY PIGSTR 110CM 6FR

## (undated) DEVICE — GUIDEWIRE SUPRA CORE 035 190CM

## (undated) DEVICE — SEE MEDLINE ITEM 156894

## (undated) DEVICE — CATH PIG145 LANGSTON 6FR 110CM

## (undated) DEVICE — COVER BAND BAG 40 X 40

## (undated) DEVICE — GUIDEWIRE STD .035X260CM STR

## (undated) DEVICE — GUIDEWIRE STF .035X180CM ANG

## (undated) DEVICE — KIT MNTR POLE MT DUL 12&48 MAC

## (undated) DEVICE — SNAP CAP 18 DOME COVERS

## (undated) DEVICE — GUIDEWIRE SAFARI2 XS CRV 275CM

## (undated) DEVICE — OMNIPAQUE CONTRAST 350MG/100ML

## (undated) DEVICE — CABLE PACER

## (undated) DEVICE — SHEATH INTRODUCER 6FR 11CM

## (undated) DEVICE — SEE MEDLINE ITEM 107746

## (undated) DEVICE — SYS EVOLUT PRO+ LOADING 23-29
Type: IMPLANTABLE DEVICE | Site: HEART | Status: NON-FUNCTIONAL
Removed: 2021-01-19

## (undated) DEVICE — GLOVE BIOGEL SKINSENSE PI 7.5

## (undated) DEVICE — OMNIPAQUE 300MG 150ML VIAL

## (undated) DEVICE — OMNIPAQUE 350 200ML

## (undated) DEVICE — CATH IMPULSE FR4 5FR 125CM

## (undated) DEVICE — CATH JACKY RADIAL 5FR 100CM

## (undated) DEVICE — GUIDEWIRE 145CM .035

## (undated) DEVICE — CATH INFINITI 4F JL4 .042X100

## (undated) DEVICE — GLOVE SURGEON SYN PF SZ 9

## (undated) DEVICE — CATH ALII 4FR INFINITY

## (undated) DEVICE — PAD DEFIB CADENCE ADULT R2

## (undated) DEVICE — DRAPE OPTIMA MAJOR PEDIATRIC

## (undated) DEVICE — TRAY CORONARY CUSTOM BAPTIST

## (undated) DEVICE — COVER INSTR ELASTIC BAND 40X20

## (undated) DEVICE — TRANSDUCER ADULT DISP

## (undated) DEVICE — VISE RADIFOCUS MULTI TORQUE

## (undated) DEVICE — BLLN LOMA VISTA TRUE 20MM